# Patient Record
Sex: FEMALE | Race: WHITE | NOT HISPANIC OR LATINO | URBAN - METROPOLITAN AREA
[De-identification: names, ages, dates, MRNs, and addresses within clinical notes are randomized per-mention and may not be internally consistent; named-entity substitution may affect disease eponyms.]

---

## 2014-09-01 RX ORDER — WARFARIN SODIUM 2.5 MG/1
1 TABLET ORAL
Qty: 0 | Refills: 0 | DISCHARGE
Start: 2014-09-01

## 2017-05-27 ENCOUNTER — EMERGENCY (EMERGENCY)
Facility: HOSPITAL | Age: 82
LOS: 1 days | Discharge: HOME | End: 2017-05-27
Attending: EMERGENCY MEDICINE | Admitting: EMERGENCY MEDICINE
Payer: MEDICARE

## 2017-05-27 VITALS
HEART RATE: 65 BPM | SYSTOLIC BLOOD PRESSURE: 147 MMHG | DIASTOLIC BLOOD PRESSURE: 92 MMHG | OXYGEN SATURATION: 99 % | RESPIRATION RATE: 20 BRPM

## 2017-05-27 VITALS
HEART RATE: 82 BPM | RESPIRATION RATE: 16 BRPM | SYSTOLIC BLOOD PRESSURE: 105 MMHG | DIASTOLIC BLOOD PRESSURE: 76 MMHG | OXYGEN SATURATION: 98 %

## 2017-05-27 VITALS
RESPIRATION RATE: 20 BRPM | OXYGEN SATURATION: 99 % | TEMPERATURE: 98 F | HEART RATE: 69 BPM | SYSTOLIC BLOOD PRESSURE: 128 MMHG | DIASTOLIC BLOOD PRESSURE: 74 MMHG

## 2017-05-27 VITALS
TEMPERATURE: 98 F | SYSTOLIC BLOOD PRESSURE: 128 MMHG | RESPIRATION RATE: 17 BRPM | DIASTOLIC BLOOD PRESSURE: 77 MMHG | HEART RATE: 61 BPM | OXYGEN SATURATION: 99 %

## 2017-05-27 LAB
ALBUMIN SERPL ELPH-MCNC: 4.5 G/DL — SIGNIFICANT CHANGE UP (ref 3.3–5)
ALP SERPL-CCNC: 101 U/L — SIGNIFICANT CHANGE UP (ref 40–120)
ALT FLD-CCNC: 20 U/L — SIGNIFICANT CHANGE UP (ref 4–33)
ANISOCYTOSIS BLD QL: SLIGHT — SIGNIFICANT CHANGE UP
APTT BLD: 33.7 SEC — SIGNIFICANT CHANGE UP (ref 27.5–37.4)
AST SERPL-CCNC: 27 U/L — SIGNIFICANT CHANGE UP (ref 4–32)
BASOPHILS # BLD AUTO: 0.05 K/UL — SIGNIFICANT CHANGE UP (ref 0–0.2)
BASOPHILS NFR BLD AUTO: 0.6 % — SIGNIFICANT CHANGE UP (ref 0–2)
BILIRUB SERPL-MCNC: 0.4 MG/DL — SIGNIFICANT CHANGE UP (ref 0.2–1.2)
BLD GP AB SCN SERPL QL: NEGATIVE — SIGNIFICANT CHANGE UP
BUN SERPL-MCNC: 43 MG/DL — HIGH (ref 7–23)
CALCIUM SERPL-MCNC: 10 MG/DL — SIGNIFICANT CHANGE UP (ref 8.4–10.5)
CHLORIDE SERPL-SCNC: 94 MMOL/L — LOW (ref 98–107)
CO2 SERPL-SCNC: 24 MMOL/L — SIGNIFICANT CHANGE UP (ref 22–31)
CREAT SERPL-MCNC: 1.82 MG/DL — HIGH (ref 0.5–1.3)
EOSINOPHIL # BLD AUTO: 0.06 K/UL — SIGNIFICANT CHANGE UP (ref 0–0.5)
EOSINOPHIL NFR BLD AUTO: 0.7 % — SIGNIFICANT CHANGE UP (ref 0–6)
GLUCOSE SERPL-MCNC: 112 MG/DL — HIGH (ref 70–99)
HCT VFR BLD CALC: 37.3 % — SIGNIFICANT CHANGE UP (ref 34.5–45)
HGB BLD-MCNC: 12.3 G/DL — SIGNIFICANT CHANGE UP (ref 11.5–15.5)
IMM GRANULOCYTES NFR BLD AUTO: 0.2 % — SIGNIFICANT CHANGE UP (ref 0–1.5)
INR BLD: 1.57 — HIGH (ref 0.88–1.17)
LYMPHOCYTES # BLD AUTO: 1.54 K/UL — SIGNIFICANT CHANGE UP (ref 1–3.3)
LYMPHOCYTES # BLD AUTO: 17.8 % — SIGNIFICANT CHANGE UP (ref 13–44)
MCHC RBC-ENTMCNC: 23.1 PG — LOW (ref 27–34)
MCHC RBC-ENTMCNC: 33 % — SIGNIFICANT CHANGE UP (ref 32–36)
MCV RBC AUTO: 70 FL — LOW (ref 80–100)
MICROCYTES BLD QL: SLIGHT — SIGNIFICANT CHANGE UP
MONOCYTES # BLD AUTO: 0.97 K/UL — HIGH (ref 0–0.9)
MONOCYTES NFR BLD AUTO: 11.2 % — SIGNIFICANT CHANGE UP (ref 2–14)
NEUTROPHILS # BLD AUTO: 6.01 K/UL — SIGNIFICANT CHANGE UP (ref 1.8–7.4)
NEUTROPHILS NFR BLD AUTO: 69.5 % — SIGNIFICANT CHANGE UP (ref 43–77)
OVALOCYTES BLD QL SMEAR: SLIGHT — SIGNIFICANT CHANGE UP
PLATELET # BLD AUTO: 218 K/UL — SIGNIFICANT CHANGE UP (ref 150–400)
PLATELET COUNT - ESTIMATE: NORMAL — SIGNIFICANT CHANGE UP
PMV BLD: 11.1 FL — SIGNIFICANT CHANGE UP (ref 7–13)
POIKILOCYTOSIS BLD QL AUTO: SLIGHT — SIGNIFICANT CHANGE UP
POTASSIUM SERPL-MCNC: 4.7 MMOL/L — SIGNIFICANT CHANGE UP (ref 3.5–5.3)
POTASSIUM SERPL-SCNC: 4.7 MMOL/L — SIGNIFICANT CHANGE UP (ref 3.5–5.3)
PROT SERPL-MCNC: 8 G/DL — SIGNIFICANT CHANGE UP (ref 6–8.3)
PROTHROM AB SERPL-ACNC: 17.8 SEC — HIGH (ref 9.8–13.1)
RBC # BLD: 5.33 M/UL — HIGH (ref 3.8–5.2)
RBC # FLD: 16.9 % — HIGH (ref 10.3–14.5)
RH IG SCN BLD-IMP: POSITIVE — SIGNIFICANT CHANGE UP
SODIUM SERPL-SCNC: 133 MMOL/L — LOW (ref 135–145)
WBC # BLD: 8.65 K/UL — SIGNIFICANT CHANGE UP (ref 3.8–10.5)
WBC # FLD AUTO: 8.65 K/UL — SIGNIFICANT CHANGE UP (ref 3.8–10.5)

## 2017-05-27 PROCEDURE — 99284 EMERGENCY DEPT VISIT MOD MDM: CPT | Mod: 25,GC

## 2017-05-27 PROCEDURE — 30901 CONTROL OF NOSEBLEED: CPT

## 2017-05-27 NOTE — ED PROVIDER NOTE - ATTENDING CONTRIBUTION TO CARE
89f, afib on coumadin. presented overnight last night for epistaxis. b/l surgicel placed by ENT. awoke this evening with blood on her hand. stuck a swab with afrin in her nose, didn't stop so plced a piece of cotton in her nose and came to ED. in ED, vs wnl, nad. cotton removed with clot and surgicel on it. no active bleeding, pharynx has no bleeding. 2 distinct areas of old blood on right nare, cauterized with silveer nitrate. no marcelo for repeat labs as asymptomatic otherwise. will observe x 2 h and d/c if no more bleeding.

## 2017-05-27 NOTE — ED PROVIDER NOTE - OBJECTIVE STATEMENT
89F with hx of Afib on coumadin and HTN presents with nose bleed. Pt was in ED for nose bleed this morning, heavy bleeding and was managed in ED and also seen by ENT. Pt was instructed to use Afrin and D./c Home. Pt came back because pt had nose bleed again after she woke up from a nap. Nose bleed was not as heavy as before. Pt used Afrin on a cotton gauze and packed her nose. In ED, pt is not actively bleeding.

## 2017-05-27 NOTE — PROGRESS NOTE ADULT - SUBJECTIVE AND OBJECTIVE BOX
CC: epistaxis  HPI: 89 year old female presents with epistaxis since 3 am from the right nostril, s/p unilateral packing in the ED with question of persistent bleeding. Patient is on coumadin. Has self resolving nose bleeds on and off    Past Medical History:  Atrial fibrillation    DVT (deep venous thrombosis)    HTN (hypertension)    Hypothyroid.    Past Surgical History:  Cardiac pacemaker.    Tobacco Usage:  · Tobacco Usage	Never smoker	    ALLERGIES AND HOME MEDICATIONS:   Allergies:        Allergies:  	No Known Allergies:     Home Medications:   * Patient Currently Takes Medications as of 29-Aug-2014 19:41 documented in Prescription Writer  · 	Coumadin 3 mg oral tablet: 1 tab(s) orally once a day  · 	losartan 100 mg oral tablet: 1 tab(s) orally once a day  · 	phenylephrine 0.25% rectal suppository: 1 suppository(ies) rectal once a day  · 	docusate sodium 100 mg oral capsule: 1 cap(s) orally 2 times a day  · 	polyethylene glycol 3350 oral powder for reconstitution: 17 gram(s) orally once a day  · 	calcium (as carbonate)-vitamin D 250 mg-125 intl units oral tablet: 1 tab(s) orally 2 times a day  · 	Multiple Vitamins with Minerals oral tablet: 1 tab(s) orally once a day  · 	ascorbic acid 500 mg oral tablet: 1 tab(s) orally once a day  · 	digoxin 125 mcg (0.125 mg) oral tablet: 1 tab(s) orally once a day  · 	furosemide 40 mg oral tablet: 1 tab(s) orally once a day  · 	levothyroxine 75 mcg (0.075 mg) oral capsule: 1 cap(s) orally once a day  · 	biotin 5 mg oral capsule: 1 cap(s) orally once a day  · 	diltiazem 360 mg/24 hours oral tablet, extended release: 1 tab(s) orally once a day    ROS: as above    nad  AAOx3  Right nostril with rhinorocket that is not well positioned and out of the nose  removed  then bilateral nasal cavities irrigated  nasal endoscopy: no pinpoint place of bleeding, no active bleeding, left septal deviation   oc: no further bleeding in posterior oropharynx  surgicel laid across both sides of the septum

## 2017-05-27 NOTE — ED ADULT TRIAGE NOTE - CHIEF COMPLAINT QUOTE
pt. arrives to ED with an active nose bleed. Pt. on coumadin , states she has a hx. of blood transfusions in the past for her nose bleed.

## 2017-05-27 NOTE — ED PROVIDER NOTE - PROGRESS NOTE DETAILS
Gollogly: Pt seen by ENT, who recommend afrin, nasal spray, bacitracin with outpt follow up. Gollogly: Pt seen by ENT, who recommend afrin, nasal spray, bacitracin with outpt follow up. Nasal packing removed by ENT, no further epistaxis.

## 2017-05-27 NOTE — ED ADULT NURSE NOTE - OBJECTIVE STATEMENT
Pt presents to room 13 with nose bleed, on coumadin for afib. Pt seen earlier today for nose bleed and discharged home. Pt states she was sleeping and her nose started bleeding again. Pt received awake, A&Ox4, denies lightheadedness, dizziness, headache. Respirations even, unlabored, pt denies SOB or chest pain. Abdomen soft, nontender, pt denies N/V/D. Skin warm, dry, appropriate for race. pt ambulatory at baseline with a walker at home, denies recent falls. Pt denies pain. NO apparent distress observed at this time, pt presents with nose packed, no active bleeding observed. VS documented per flow, safety maintained, awaiting MD ricketts. Will continue to monitor.

## 2017-05-27 NOTE — ED PROVIDER NOTE - OBJECTIVE STATEMENT
89yo f pmh DVT (on Coumadin), HTN, hypothyroid, presents with epistaxis. bleeding since approx 4AM, last INR was 3 one week ago. Previously had nose bleed that required 3u rbcs and one week hospital admission.

## 2017-05-27 NOTE — ED PROVIDER NOTE - PROGRESS NOTE DETAILS
Ela: observed and no further bleeding, f/u as previously scheduled. instructions on icing and pinching of nose given.  The patient was given verbal and written discharge instructions. Specifically, instructions when to return to the ED and when to seek follow-up from their pcp was discussed. Any specialty follow-up was discussed, including how to make an appointment.  Instructions were discussed in simple, plain language and was understood by the patient. The patient understands that should their symptoms worsen or any new symptoms arise, they should return to the ED immediately for further evaluation.

## 2017-05-27 NOTE — ED PROVIDER NOTE - ATTENDING CONTRIBUTION TO CARE
HERBERT RICHARDSON, ATTENDING NOTE:  89 yo F with a past medical history of DVT (on Coumadin for AC), Hypertension, hypothyroid, presents with epistaxis. bleeding since 4AM.  Patient states last INR was 3 one week ago. Previously nose bleed required 3U of PRBC's.  Denies chest pain, SOB or dizziness.  HERBERT RICHARDSON, ATTENDING NOTE:  Patient is awake and alert and in no acute distress.  Normocephalic/atraumatic.  Auricles are normal.  Neck supple. Nose: moderate epistaxis from B/L nares.  Lungs CTAB, no wheeze, no rhonchi,  no rales.  Heart is regular rate and rhythm.  Abdomen is soft, not distended +BS.  Back is nontender, no CVAT.  Moving all 4 extremities.   Neurologically grossly intact.  Affect is appropriate.  DR. RICHARDSON, ATTENDING MD-  I performed a face to face bedside interview with patient regarding history of present illness, review of symptoms and past medical history. I completed an independent physical exam.  I have discussed patient's plan of care with Dr. Lange.   I agree with note as stated above, having amended the EMR as needed to reflect my findings. I have discussed the assessment and plan of care.  This includes during the time I functioned as the attending physician for this patient. HERBERT RICHARDSON, ATTENDING NOTE:  89 yo F with a past medical history of DVT, AFib (on Coumadin for AC), Hypertension, hypothyroid, presents with epistaxis. bleeding since 4AM.  Patient states last INR was 3 one week ago. Previously nose bleed required 3U of PRBC's.  Denies chest pain, SOB or dizziness.  HERBERT RICHARDSON, ATTENDING NOTE:  Patient is awake and alert and in no acute distress.  Normocephalic/atraumatic.  Auricles are normal.  Neck supple. Nose: moderate epistaxis from B/L nares. Posterior pharynx clear.  Lungs CTAB, no wheeze, no rhonchi,  no rales.  Heart is irregular rhythm.  Abdomen is soft, not distended +BS.  Back is nontender, no CVAT.  Moving all 4 extremities.   Neurologically grossly intact.  Affect is appropriate.  DR. RICHARDSON, ATTENDING MD-  I performed a face to face bedside interview with patient regarding history of present illness, review of symptoms and past medical history. I completed an independent physical exam.  I have discussed patient's plan of care with Dr. Lange.   I agree with note as stated above, having amended the EMR as needed to reflect my findings. I have discussed the assessment and plan of care.  This includes during the time I functioned as the attending physician for this patient.

## 2017-05-27 NOTE — ED PROVIDER NOTE - CARE PLAN
Principal Discharge DX:	Epistaxis  Instructions for follow-up, activity and diet:	-- Take keflex as prescribed. Your prescription is waiting for you at the pharmacy you selected.   -- Use Afrin (oxymetazoline) 2-3 times per day for 2 days. DO NOT USE FOR LONGER THAN 2 DAYS.  -- Use saline nasal spray every hour for the next several days.  -- Apply bacitracin ointment 3 times a day for next 2-3 days.  -- See referral list to follow up with ENT in 3-5 days. Principal Discharge DX:	Epistaxis  Instructions for follow-up, activity and diet:	-- Use Afrin (oxymetazoline) 2-3 times per day for 2 days. DO NOT USE FOR LONGER THAN 2 DAYS.  -- Use saline nasal spray every hour for the next 3-4 days.  -- Apply bacitracin ointment 3 times a day for next 3-4 days.  -- See referral list to follow up with ENT in 3-5 days.  -- Return to ER immediately for new or worsening symptoms, any urgent issues, or for any concerns.

## 2017-05-27 NOTE — ED PROVIDER NOTE - MEDICAL DECISION MAKING DETAILS
pt with epistaxis on coumadin will check basics/coags, bleeding controlled with rhino rocket to right nostril after failed afrin + pressure. ENT consulted

## 2017-05-27 NOTE — ED ADULT TRIAGE NOTE - CHIEF COMPLAINT QUOTE
Pt is on coumadin , states was here this am. Pt now returns for reoccurring nose bleed. No active nose bleed noted at this time.

## 2017-05-27 NOTE — PROGRESS NOTE ADULT - ASSESSMENT
self resolved epistaxis with surgicel packing  - nasal saline q1  - bacitracin tid  - afrin x 3 days, then prn for bleeding

## 2017-05-27 NOTE — ED PROVIDER NOTE - PLAN OF CARE
-- Take keflex as prescribed. Your prescription is waiting for you at the pharmacy you selected.   -- Use Afrin (oxymetazoline) 2-3 times per day for 2 days. DO NOT USE FOR LONGER THAN 2 DAYS.  -- Use saline nasal spray every hour for the next several days.  -- Apply bacitracin ointment 3 times a day for next 2-3 days.  -- See referral list to follow up with ENT in 3-5 days. -- Use Afrin (oxymetazoline) 2-3 times per day for 2 days. DO NOT USE FOR LONGER THAN 2 DAYS.  -- Use saline nasal spray every hour for the next 3-4 days.  -- Apply bacitracin ointment 3 times a day for next 3-4 days.  -- See referral list to follow up with ENT in 3-5 days.  -- Return to ER immediately for new or worsening symptoms, any urgent issues, or for any concerns.

## 2017-05-27 NOTE — ED ADULT NURSE NOTE - OBJECTIVE STATEMENT
Patient received in room #19 c/o nosebleed starting around 3am while she was sleeping. A&Ox3. Patient reports she is on coumadin, her last INR was tested about a week ago and it was "3". Patient denies any lightheadedness or dizziness. Patient reports similar issue in the past and has had blood transfusions. VS as noted. 20G IV placed in right ac, labs drawn and sent. MD at bedside. Will monitor.

## 2017-05-28 ENCOUNTER — EMERGENCY (EMERGENCY)
Facility: HOSPITAL | Age: 82
LOS: 1 days | Discharge: HOME | End: 2017-05-28
Attending: EMERGENCY MEDICINE | Admitting: EMERGENCY MEDICINE
Payer: MEDICARE

## 2017-05-28 VITALS
SYSTOLIC BLOOD PRESSURE: 138 MMHG | DIASTOLIC BLOOD PRESSURE: 84 MMHG | TEMPERATURE: 98 F | HEART RATE: 70 BPM | OXYGEN SATURATION: 98 % | RESPIRATION RATE: 18 BRPM

## 2017-05-28 VITALS
DIASTOLIC BLOOD PRESSURE: 76 MMHG | HEART RATE: 103 BPM | RESPIRATION RATE: 19 BRPM | OXYGEN SATURATION: 97 % | SYSTOLIC BLOOD PRESSURE: 150 MMHG | TEMPERATURE: 99 F

## 2017-05-28 LAB
ALBUMIN SERPL ELPH-MCNC: 4.1 G/DL — SIGNIFICANT CHANGE UP (ref 3.3–5)
ALP SERPL-CCNC: 86 U/L — SIGNIFICANT CHANGE UP (ref 40–120)
ALT FLD-CCNC: 16 U/L — SIGNIFICANT CHANGE UP (ref 4–33)
APTT BLD: 34.7 SEC — SIGNIFICANT CHANGE UP (ref 27.5–37.4)
AST SERPL-CCNC: 20 U/L — SIGNIFICANT CHANGE UP (ref 4–32)
BILIRUB SERPL-MCNC: 0.5 MG/DL — SIGNIFICANT CHANGE UP (ref 0.2–1.2)
BUN SERPL-MCNC: 40 MG/DL — HIGH (ref 7–23)
CALCIUM SERPL-MCNC: 9.5 MG/DL — SIGNIFICANT CHANGE UP (ref 8.4–10.5)
CHLORIDE SERPL-SCNC: 105 MMOL/L — SIGNIFICANT CHANGE UP (ref 98–107)
CO2 SERPL-SCNC: 23 MMOL/L — SIGNIFICANT CHANGE UP (ref 22–31)
CREAT SERPL-MCNC: 1.61 MG/DL — HIGH (ref 0.5–1.3)
GLUCOSE SERPL-MCNC: 79 MG/DL — SIGNIFICANT CHANGE UP (ref 70–99)
HCT VFR BLD CALC: 37.1 % — SIGNIFICANT CHANGE UP (ref 34.5–45)
HGB BLD-MCNC: 12 G/DL — SIGNIFICANT CHANGE UP (ref 11.5–15.5)
INR BLD: 1.72 — HIGH (ref 0.88–1.17)
MCHC RBC-ENTMCNC: 22.7 PG — LOW (ref 27–34)
MCHC RBC-ENTMCNC: 32.3 % — SIGNIFICANT CHANGE UP (ref 32–36)
MCV RBC AUTO: 70.1 FL — LOW (ref 80–100)
PLATELET # BLD AUTO: 239 K/UL — SIGNIFICANT CHANGE UP (ref 150–400)
PMV BLD: 10.6 FL — SIGNIFICANT CHANGE UP (ref 7–13)
POTASSIUM SERPL-MCNC: 4.5 MMOL/L — SIGNIFICANT CHANGE UP (ref 3.5–5.3)
POTASSIUM SERPL-SCNC: 4.5 MMOL/L — SIGNIFICANT CHANGE UP (ref 3.5–5.3)
PROT SERPL-MCNC: 7.6 G/DL — SIGNIFICANT CHANGE UP (ref 6–8.3)
PROTHROM AB SERPL-ACNC: 19.5 SEC — HIGH (ref 9.8–13.1)
RBC # BLD: 5.29 M/UL — HIGH (ref 3.8–5.2)
RBC # FLD: 17 % — HIGH (ref 10.3–14.5)
SODIUM SERPL-SCNC: 142 MMOL/L — SIGNIFICANT CHANGE UP (ref 135–145)
WBC # BLD: 7.03 K/UL — SIGNIFICANT CHANGE UP (ref 3.8–10.5)
WBC # FLD AUTO: 7.03 K/UL — SIGNIFICANT CHANGE UP (ref 3.8–10.5)

## 2017-05-28 PROCEDURE — 99284 EMERGENCY DEPT VISIT MOD MDM: CPT | Mod: 25

## 2017-05-28 PROCEDURE — 30903 CONTROL OF NOSEBLEED: CPT

## 2017-05-28 RX ORDER — CEPHALEXIN 500 MG
1 CAPSULE ORAL
Qty: 10 | Refills: 0
Start: 2017-05-28 | End: 2017-06-02

## 2017-05-28 RX ORDER — CEPHALEXIN 500 MG
1 CAPSULE ORAL
Qty: 10 | Refills: 0 | OUTPATIENT
Start: 2017-05-28 | End: 2017-06-02

## 2017-05-28 NOTE — ED PROVIDER NOTE - OBJECTIVE STATEMENT
89 y/o F PMHx afib (on coumadin), HTN, HLD, hypothyroidism presents c/o recurrent epistaxis since this am. 89 y/o F PMHx afib (on coumadin), HTN, HLD, hypothyroidism presents c/o recurrent epistaxis since this am. Pt has been in the ER 4 times over the past 2 days for epistaxis. Was initially seen by ENT who cauterized the bleed, then put in a rhinorocket that was taken out. Bleeding stopped. When the bleeding recurred, the left nares was packed and pt dc'c after bleeding stopped. Today bleeding recurred in the right nares prompting ED visit. Of note, pt required admission and transfusion in the past for epistaxis. Denies fevers, chills, or any other complaints.

## 2017-05-28 NOTE — ED PROVIDER NOTE - PLAN OF CARE
See your primary care doctor within 24-48 hours. See your ENT this week for further evaluation, bring copies of all reports with you. Continue Keflex 1 tab twice a day for 5 days. Continue nasal saline into the left nostril throughout the day. DO NOT remove the packing from the right nostril (to be removed by ENT). Return to the ER for worsening symptoms or any other concerns.

## 2017-05-28 NOTE — ED PROVIDER NOTE - MEDICAL DECISION MAKING DETAILS
89 y/o F w/ recurrent epistaxis- R rhinorocket placed but subsequent cessation of bleeding. Plan labs, reassess

## 2017-05-28 NOTE — ED PROVIDER NOTE - ATTENDING CONTRIBUTION TO CARE
att att: Juancho ALCANTAR Attending Note - Dr. Dawkins  89 y/o F PMHx afib (on coumadin), HTN, HLD, hypothyroidism presents c/o recurrent epistaxis since this am.  PE: pt is alert and oriented, perrl, ent nose new bleeding from right nostril, membranes are moist, neck supple. no lymphadenopathy or thyroid enlargement, No JVD.  Chest clear to P&A, Heart- reg rhythm without murmur, rubs or gallops, radial pulses equal bilaterally.  Abd is soft, non-tender, Bowel sounds are active. no mass or organomegaly. : No CVA tenderness. Neuro:  Pt alert and oriented x 3. Perrl    Distal neurosensory is intact. Motor function is 5/5 strength bilaterally.  No focal deficits. Extremities:  No edema.  Skin: warm and dry.

## 2017-05-28 NOTE — ED PROVIDER NOTE - PROGRESS NOTE DETAILS
JONEL Calvo: Pt reassessed, no complaints, has not had any bleeding since the rhinorocket was placed. Pt offered to stay in the CDU, wants to be admitted, however H/H stable. Chanda PGY3: Pt was offered CDU for obs and possible admission given recurrent epistaxis, however pt does not want to stay in CDU, demanding admission. Explained to pt that Hb unchanged over 24 hours, bleeding currently controlled, however that from CDU if bleeding reoccurs or any other concerns pt could be admitted at that point. Pt still refusing CDU. Pt has f/u with ENT on Tuesday, explained to pt that bleeding is currently controlled and no evidence of anemia, is stable for d/c. Pt would prefer discharge over CDU. Will d/c. JONEL Calvo: SW to arrange transport.

## 2017-05-28 NOTE — ED ADULT TRIAGE NOTE - CHIEF COMPLAINT QUOTE
Co epistaxis. Started last night and came to [facility] and had packing placed. Bleeding started again today. On warfarin.

## 2017-05-28 NOTE — ED PROVIDER NOTE - CARE PLAN
Principal Discharge DX:	Epistaxis, recurrent Principal Discharge DX:	Epistaxis, recurrent  Instructions for follow-up, activity and diet:	See your primary care doctor within 24-48 hours. See your ENT this week for further evaluation, bring copies of all reports with you. Continue Keflex 1 tab twice a day for 5 days. Continue nasal saline into the left nostril throughout the day. DO NOT remove the packing from the right nostril (to be removed by ENT). Return to the ER for worsening symptoms or any other concerns.

## 2017-05-28 NOTE — ED ADULT NURSE NOTE - CHIEF COMPLAINT QUOTE
Co epistaxis. Started last night and came to [facility] and had packing placed. Bleeding started again today. On warfarin.

## 2017-05-29 ENCOUNTER — EMERGENCY (EMERGENCY)
Facility: HOSPITAL | Age: 82
LOS: 1 days | Discharge: HOME | End: 2017-05-29
Attending: EMERGENCY MEDICINE | Admitting: EMERGENCY MEDICINE
Payer: MEDICARE

## 2017-05-29 VITALS
SYSTOLIC BLOOD PRESSURE: 151 MMHG | HEART RATE: 75 BPM | TEMPERATURE: 98 F | OXYGEN SATURATION: 100 % | RESPIRATION RATE: 18 BRPM | DIASTOLIC BLOOD PRESSURE: 86 MMHG

## 2017-05-29 VITALS
RESPIRATION RATE: 18 BRPM | SYSTOLIC BLOOD PRESSURE: 140 MMHG | OXYGEN SATURATION: 98 % | HEART RATE: 64 BPM | DIASTOLIC BLOOD PRESSURE: 91 MMHG

## 2017-05-29 LAB
ALBUMIN SERPL ELPH-MCNC: 4.2 G/DL — SIGNIFICANT CHANGE UP (ref 3.3–5)
ALP SERPL-CCNC: 87 U/L — SIGNIFICANT CHANGE UP (ref 40–120)
ALT FLD-CCNC: 19 U/L — SIGNIFICANT CHANGE UP (ref 4–33)
APTT BLD: 33.7 SEC — SIGNIFICANT CHANGE UP (ref 27.5–37.4)
AST SERPL-CCNC: 28 U/L — SIGNIFICANT CHANGE UP (ref 4–32)
BASOPHILS # BLD AUTO: 0.05 K/UL — SIGNIFICANT CHANGE UP (ref 0–0.2)
BASOPHILS NFR BLD AUTO: 0.6 % — SIGNIFICANT CHANGE UP (ref 0–2)
BILIRUB SERPL-MCNC: 0.7 MG/DL — SIGNIFICANT CHANGE UP (ref 0.2–1.2)
BUN SERPL-MCNC: 39 MG/DL — HIGH (ref 7–23)
CALCIUM SERPL-MCNC: 9.7 MG/DL — SIGNIFICANT CHANGE UP (ref 8.4–10.5)
CHLORIDE SERPL-SCNC: 101 MMOL/L — SIGNIFICANT CHANGE UP (ref 98–107)
CO2 SERPL-SCNC: 22 MMOL/L — SIGNIFICANT CHANGE UP (ref 22–31)
CREAT SERPL-MCNC: 1.31 MG/DL — HIGH (ref 0.5–1.3)
EOSINOPHIL # BLD AUTO: 0.09 K/UL — SIGNIFICANT CHANGE UP (ref 0–0.5)
EOSINOPHIL NFR BLD AUTO: 1 % — SIGNIFICANT CHANGE UP (ref 0–6)
GLUCOSE SERPL-MCNC: 86 MG/DL — SIGNIFICANT CHANGE UP (ref 70–99)
HCT VFR BLD CALC: 36.6 % — SIGNIFICANT CHANGE UP (ref 34.5–45)
HGB BLD-MCNC: 12.2 G/DL — SIGNIFICANT CHANGE UP (ref 11.5–15.5)
IMM GRANULOCYTES NFR BLD AUTO: 0.2 % — SIGNIFICANT CHANGE UP (ref 0–1.5)
INR BLD: 1.31 — HIGH (ref 0.88–1.17)
LYMPHOCYTES # BLD AUTO: 1.83 K/UL — SIGNIFICANT CHANGE UP (ref 1–3.3)
LYMPHOCYTES # BLD AUTO: 20.5 % — SIGNIFICANT CHANGE UP (ref 13–44)
MCHC RBC-ENTMCNC: 23.3 PG — LOW (ref 27–34)
MCHC RBC-ENTMCNC: 33.3 % — SIGNIFICANT CHANGE UP (ref 32–36)
MCV RBC AUTO: 70 FL — LOW (ref 80–100)
MONOCYTES # BLD AUTO: 0.83 K/UL — SIGNIFICANT CHANGE UP (ref 0–0.9)
MONOCYTES NFR BLD AUTO: 9.3 % — SIGNIFICANT CHANGE UP (ref 2–14)
NEUTROPHILS # BLD AUTO: 6.12 K/UL — SIGNIFICANT CHANGE UP (ref 1.8–7.4)
NEUTROPHILS NFR BLD AUTO: 68.4 % — SIGNIFICANT CHANGE UP (ref 43–77)
PLATELET # BLD AUTO: 212 K/UL — SIGNIFICANT CHANGE UP (ref 150–400)
PMV BLD: 10.5 FL — SIGNIFICANT CHANGE UP (ref 7–13)
POTASSIUM SERPL-MCNC: 4.6 MMOL/L — SIGNIFICANT CHANGE UP (ref 3.5–5.3)
POTASSIUM SERPL-SCNC: 4.6 MMOL/L — SIGNIFICANT CHANGE UP (ref 3.5–5.3)
PROT SERPL-MCNC: 7.7 G/DL — SIGNIFICANT CHANGE UP (ref 6–8.3)
PROTHROM AB SERPL-ACNC: 14.8 SEC — HIGH (ref 9.8–13.1)
RBC # BLD: 5.23 M/UL — HIGH (ref 3.8–5.2)
RBC # FLD: 17.1 % — HIGH (ref 10.3–14.5)
SODIUM SERPL-SCNC: 138 MMOL/L — SIGNIFICANT CHANGE UP (ref 135–145)
WBC # BLD: 8.94 K/UL — SIGNIFICANT CHANGE UP (ref 3.8–10.5)
WBC # FLD AUTO: 8.94 K/UL — SIGNIFICANT CHANGE UP (ref 3.8–10.5)

## 2017-05-29 PROCEDURE — 99284 EMERGENCY DEPT VISIT MOD MDM: CPT | Mod: GC

## 2017-05-29 NOTE — ED PROVIDER NOTE - ATTENDING CONTRIBUTION TO CARE
89yo F w/ AFib on coumadin, HTN, HLD, p/w epistaxis, 5th ED visit in last few days, had rhino rocket placed yest, states she had to sleep upright last night because every time she would lay down she would have posterior bleeding, then today developed anterior bleeding around the rhino rocket so came to ED.  Denies further trauma, feels otherwise well. On exam: alert in no distress, VSS, + rapidrhino in R nare with slight oozing anteriorly. No blood in pharynx. Labs sent and ENT consulted.

## 2017-05-29 NOTE — ED PROVIDER NOTE - PROGRESS NOTE DETAILS
Rhino starr replaced by ENT, no further bleeding, pt feels comfortable going home and will see her ENT this wk.

## 2017-05-29 NOTE — ED ADULT NURSE NOTE - OBJECTIVE STATEMENT
Pt received to rm 13, a&ox3, c/o multiple episodes of epistaxis, requesting admission today. Pt on coumadin for afib, mild to moderate bleeding noted to R nostril with rhinorocket in place. Pt denies any pain or discomfort at present, no sob/dypnea noted. Labs drawn and sent, IVL placed. MD by bedside for eval.

## 2017-05-29 NOTE — ED PROVIDER NOTE - OBJECTIVE STATEMENT
89yo F w/ AFib on coumadin, HTN, HLD, p/w epistaxis, 5th ED visit in last few days, had rhino rocket placed yest, states she had to sleep upright last night because every time she would lay down she would have posterior bleeding, then today developed anterior bleeding around the rhino rocket so came to ED.  Denies further trauma, feels otherwise well.  ENT: Jef Hopkins

## 2017-05-29 NOTE — CONSULT NOTE ADULT - SUBJECTIVE AND OBJECTIVE BOX
Reason for Consultation:ED  Requested by:ENT    Patient is a 89y old  Female who presents with a chief complaint of epistaxis  HPI: 89 yo F with history of epistaxis, on coumadin, had RR placed by ED yesterday, continues to have mild oozing posterioly and anteriorly this AM. No difficulty breathing. Not coughing up blood.    Birth History:  PAST MEDICAL & SURGICAL HISTORY:  Atrial fibrillation  Hypothyroid  HTN (hypertension)  DVT (deep venous thrombosis)  Cardiac pacemaker      Allergies  No Known Allergies    REVIEW OF SYSTEMS:    CONSTITUTIONAL: No fever, weight loss, or fatigue  EYES: No eye pain, visual disturbances, or discharge  ENMT: + Epistaxis as in HPI No difficulty hearing, tinnitus, vertigo; No sinus or throat pain  NECK: No pain or stiffness  RESPIRATORY: No cough, wheezing, chills or hemoptysis; No shortness of breath  CARDIOVASCULAR: No chest pain, palpitations, dizziness, or leg swelling  GASTROINTESTINAL: No abdominal or epigastric pain. No nausea, vomiting, or hematemesis; No diarrhea or constipation. No melena or hematochezia.                        12.2   8.94  )-----------( 212      ( 29 May 2017 10:25 )             36.6     05-28    142  |  105  |  40<H>  ----------------------------<  79  4.5   |  23  |  1.61<H>    Ca    9.5      28 May 2017 11:01    TPro  7.6  /  Alb  4.1  /  TBili  0.5  /  DBili  x   /  AST  20  /  ALT  16  /  AlkPhos  86  05-28    INR: 1.31    Vital Signs Last 24 Hrs  T(C): 36.7, Max: 36.7 (05-29 @ 10:00)  T(F): 98.1, Max: 98.1 (05-29 @ 10:00)  HR: 64 (64 - 75)  BP: 140/91 (140/91 - 151/86)  BP(mean): --  RR: 18 (18 - 18)  SpO2: 98% (98% - 100%)      PHYSICAL EXAM:  Constitutional Normal: well nourished, well developed, non-dysmorphic, no acute distress  Psychiatric: age appropriate behavior, cooperative  Skin: no obvious skin lesions  Lymphatic: no cervical lymphadenopathy    External Nose:  Normal, no structural deformities		  Anterior Nasal Cavity:	RR in R naris paritally out, cuff not inflated. L NC patent, no bleeding. RR replaced on R, fully inflated. No active bleeding noted  Oral Cavity:  Good dentition, tongue midline, no lesions or ulcerations, no bleeding noted  Neck: No palpable lymphadenopathy  Pulmonary: No Acute Distress.   Neurologic: awake and alert      89 yo F with recurerrent epistaxis  -RR to remain in place x 4 days (FU with Dr. Hopkins, her outpt ENT, on Thursday of this week for removal)  -Keflex BID until removal  -Pt may FU with [facility] ENT if she prefers in 3 days for removal. [phone]  -Cuff may be further inflated by ED should pt continue have mild oozing  -Please page ENT with any further questions

## 2017-05-29 NOTE — ED ADULT TRIAGE NOTE - CHIEF COMPLAINT QUOTE
pt c/o epistaxis on coumadin. pt reports stopping coumadin on saturday. pt seen in ED multiple times over past few days.

## 2017-05-31 ENCOUNTER — APPOINTMENT (OUTPATIENT)
Dept: ELECTROPHYSIOLOGY | Facility: CLINIC | Age: 82
End: 2017-05-31

## 2017-05-31 ENCOUNTER — EMERGENCY (EMERGENCY)
Facility: HOSPITAL | Age: 82
LOS: 1 days | Discharge: HOME | End: 2017-05-31
Attending: EMERGENCY MEDICINE | Admitting: EMERGENCY MEDICINE
Payer: MEDICARE

## 2017-05-31 VITALS
OXYGEN SATURATION: 98 % | DIASTOLIC BLOOD PRESSURE: 72 MMHG | HEART RATE: 65 BPM | RESPIRATION RATE: 18 BRPM | SYSTOLIC BLOOD PRESSURE: 122 MMHG

## 2017-05-31 VITALS
SYSTOLIC BLOOD PRESSURE: 141 MMHG | DIASTOLIC BLOOD PRESSURE: 83 MMHG | HEART RATE: 72 BPM | OXYGEN SATURATION: 96 % | RESPIRATION RATE: 14 BRPM | TEMPERATURE: 98 F

## 2017-05-31 LAB
ALBUMIN SERPL ELPH-MCNC: 4.2 G/DL — SIGNIFICANT CHANGE UP (ref 3.3–5)
ALP SERPL-CCNC: 95 U/L — SIGNIFICANT CHANGE UP (ref 40–120)
ALT FLD-CCNC: 15 U/L — SIGNIFICANT CHANGE UP (ref 4–33)
ANISOCYTOSIS BLD QL: SIGNIFICANT CHANGE UP
APTT BLD: 29.9 SEC — SIGNIFICANT CHANGE UP (ref 27.5–37.4)
AST SERPL-CCNC: 19 U/L — SIGNIFICANT CHANGE UP (ref 4–32)
BASOPHILS # BLD AUTO: 0.03 K/UL — SIGNIFICANT CHANGE UP (ref 0–0.2)
BASOPHILS NFR BLD AUTO: 0.2 % — SIGNIFICANT CHANGE UP (ref 0–2)
BASOPHILS NFR SPEC: 0 % — SIGNIFICANT CHANGE UP (ref 0–2)
BILIRUB SERPL-MCNC: 1.1 MG/DL — SIGNIFICANT CHANGE UP (ref 0.2–1.2)
BUN SERPL-MCNC: 35 MG/DL — HIGH (ref 7–23)
CALCIUM SERPL-MCNC: 10.1 MG/DL — SIGNIFICANT CHANGE UP (ref 8.4–10.5)
CHLORIDE SERPL-SCNC: 97 MMOL/L — LOW (ref 98–107)
CO2 SERPL-SCNC: 24 MMOL/L — SIGNIFICANT CHANGE UP (ref 22–31)
CREAT SERPL-MCNC: 1.25 MG/DL — SIGNIFICANT CHANGE UP (ref 0.5–1.3)
ELLIPTOCYTES BLD QL SMEAR: SLIGHT — SIGNIFICANT CHANGE UP
EOSINOPHIL # BLD AUTO: 0.05 K/UL — SIGNIFICANT CHANGE UP (ref 0–0.5)
EOSINOPHIL NFR BLD AUTO: 0.3 % — SIGNIFICANT CHANGE UP (ref 0–6)
EOSINOPHIL NFR FLD: 0 % — SIGNIFICANT CHANGE UP (ref 0–6)
GIANT PLATELETS BLD QL SMEAR: PRESENT — SIGNIFICANT CHANGE UP
GLUCOSE SERPL-MCNC: 102 MG/DL — HIGH (ref 70–99)
HCT VFR BLD CALC: 37.4 % — SIGNIFICANT CHANGE UP (ref 34.5–45)
HGB BLD-MCNC: 12.7 G/DL — SIGNIFICANT CHANGE UP (ref 11.5–15.5)
IMM GRANULOCYTES NFR BLD AUTO: 0.1 % — SIGNIFICANT CHANGE UP (ref 0–1.5)
INR BLD: 1.12 — SIGNIFICANT CHANGE UP (ref 0.88–1.17)
LYMPHOCYTES # BLD AUTO: 1.52 K/UL — SIGNIFICANT CHANGE UP (ref 1–3.3)
LYMPHOCYTES # BLD AUTO: 10.6 % — LOW (ref 13–44)
LYMPHOCYTES NFR SPEC AUTO: 11 % — LOW (ref 13–44)
MANUAL SMEAR VERIFICATION: SIGNIFICANT CHANGE UP
MCHC RBC-ENTMCNC: 23.4 PG — LOW (ref 27–34)
MCHC RBC-ENTMCNC: 34 % — SIGNIFICANT CHANGE UP (ref 32–36)
MCV RBC AUTO: 69 FL — LOW (ref 80–100)
MICROCYTES BLD QL: SIGNIFICANT CHANGE UP
MONOCYTES # BLD AUTO: 2.24 K/UL — HIGH (ref 0–0.9)
MONOCYTES NFR BLD AUTO: 15.6 % — HIGH (ref 2–14)
MONOCYTES NFR BLD: 13 % — HIGH (ref 2–9)
NEUTROPHIL AB SER-ACNC: 76 % — SIGNIFICANT CHANGE UP (ref 43–77)
NEUTROPHILS # BLD AUTO: 10.46 K/UL — HIGH (ref 1.8–7.4)
NEUTROPHILS NFR BLD AUTO: 73.2 % — SIGNIFICANT CHANGE UP (ref 43–77)
PLATELET # BLD AUTO: 227 K/UL — SIGNIFICANT CHANGE UP (ref 150–400)
PLATELET COUNT - ESTIMATE: NORMAL — SIGNIFICANT CHANGE UP
PMV BLD: 10.6 FL — SIGNIFICANT CHANGE UP (ref 7–13)
POIKILOCYTOSIS BLD QL AUTO: SIGNIFICANT CHANGE UP
POTASSIUM SERPL-MCNC: 4 MMOL/L — SIGNIFICANT CHANGE UP (ref 3.5–5.3)
POTASSIUM SERPL-SCNC: 4 MMOL/L — SIGNIFICANT CHANGE UP (ref 3.5–5.3)
PROT SERPL-MCNC: 7.8 G/DL — SIGNIFICANT CHANGE UP (ref 6–8.3)
PROTHROM AB SERPL-ACNC: 12.6 SEC — SIGNIFICANT CHANGE UP (ref 9.8–13.1)
RBC # BLD: 5.42 M/UL — HIGH (ref 3.8–5.2)
RBC # FLD: 17 % — HIGH (ref 10.3–14.5)
SCHISTOCYTES BLD QL AUTO: SLIGHT — SIGNIFICANT CHANGE UP
SICKLE CELLS BLD QL SMEAR: SLIGHT — SIGNIFICANT CHANGE UP
SODIUM SERPL-SCNC: 138 MMOL/L — SIGNIFICANT CHANGE UP (ref 135–145)
TARGETS BLD QL SMEAR: SLIGHT — SIGNIFICANT CHANGE UP
WBC # BLD: 14.32 K/UL — HIGH (ref 3.8–10.5)
WBC # FLD AUTO: 14.32 K/UL — HIGH (ref 3.8–10.5)

## 2017-05-31 PROCEDURE — 99284 EMERGENCY DEPT VISIT MOD MDM: CPT | Mod: 25,GC

## 2017-05-31 NOTE — ED PROVIDER NOTE - ATTENDING CONTRIBUTION TO CARE
Pt was seen and evaluated by me. Pt states she has been back several times for recurrent epistaxis. Pt notes last episode required packing. Pt states she had some bleeding through the packing. Pt denies any sensation of swallowing blood. Pt denies any weakness, nausea, vomiting, SOB, chest pain, or abd pain. Pt his on Coumadin for A-fib. Noted to have Rhino Rocket to right nares with some dried blood to gauze. Posterior pharynx clear. Heart rate irregularly irregular. Lungs CTA b/l. Abd soft, non-tender.

## 2017-05-31 NOTE — ED ADULT NURSE REASSESSMENT NOTE - NS ED NURSE REASSESS COMMENT FT1
pt. a&ox3, in NAD, MD inserted new pack on rt nostril, no active bleeding at this time. awaits dispo. will continue to monitor

## 2017-05-31 NOTE — ED PROVIDER NOTE - OBJECTIVE STATEMENT
89 y/o F pt with PMHx of DVT, A-fib, HTN, and Hypothyroidism and PSHx of cardiac pacemaker, arrives to the ED c/o epistaxis episodes onset x2 days. Pt reports that she was here 2 days ago for the same sx; had packing put in place and was put on Keflex. Pt then saw her personal ENT yesterday and had part of the packing removed. Last epistaxis episode was 2am tonight which prompted arrival to ED. Denies CP, dizziness, or any other complaints. NKDA.

## 2017-05-31 NOTE — ED PROVIDER NOTE - QUALITY
89 y/o F pt with PMHx of Blood clots, Hypothyroidism, arrives to the ED c/o epistaxis episodes onset x2 days. Pt reports that she was here 2 days ago for the same sx; had packing put in place and was put on Keflex. Pt then saw her personal ENT yesterday and had part of the packing removed. Last epistaxis episode was 2am tonight which prompted arrival to ED. Denies CP, dizziness, or any other complaints. NKDA.

## 2017-05-31 NOTE — ED ADULT NURSE REASSESSMENT NOTE - NS ED NURSE REASSESS COMMENT FT1
pt. in NAD, was initially discharge, saline lock was removed. upon discharge, pt. had another episode of minimal bleeding on rt nostril. no active bleeding now. will monitor as per JONEL Long and for possible CDU. will continue to provide nsg care.

## 2017-05-31 NOTE — ED PROVIDER NOTE - NS ED MD SCRIBE ATTENDING SCRIBE SECTIONS
DISPOSITION/VITAL SIGNS( Pullset)/REVIEW OF SYSTEMS/PAST MEDICAL/SURGICAL/SOCIAL HISTORY/HISTORY OF PRESENT ILLNESS

## 2017-05-31 NOTE — ED PROVIDER NOTE - PROGRESS NOTE DETAILS
Rhino rocket removed noted to have some clots, no active bleeding. TXA applied to gauze and placed into right nares. Gauze removed and noted to have slight oozing still. Rhino rocket placed to right nares. labs reviewed. Rhino in place. Bleeding as stopped at this time. Pt stable for discharge and to continue kelfex and to follow up with private ENT. Discharge papers given to patient. Pt was ready for discharge and nose started bleeding slightly. Rhino rocket not saturated, mild bleeding. No heavy bleeding. Pt refuses to go home due to 6 times returning to ED for same complaint. Will continue to monitor and place in CDU for observation this am, once bed is available. labs reviewed. Rhino in place. Bleeding as stopped at this time. Pt stable for discharge and to continue keflex and to follow up with private ENT. Pt refusing to go to observation unit. Pt not actively bleeding at this time. Pt will be discharged and discussed to return if any worsening symptoms. Pt observed for 2 hours post nasal packing with no active bleeding. Mild blood tinge when pt wipes nose but not bleeding from nares observed. Discussed with pt what to return for and to f/u with ENT. Pt observed for 2 hours post nasal packing with no active bleeding. Mild blood tinge when pt wipes nose but not bleeding from nares observed. Discussed with pt what to return for and to f/u with ENT. Advised to continue regular medication.

## 2017-05-31 NOTE — ED ADULT NURSE NOTE - OBJECTIVE STATEMENT
pt. came in c/o nose bleeding x 2 hrs. pt. states this is the 7th time she had nose bleed since last month. pt. had her rt nostril packed last Monday, noticed the dressing this morning with blood and then some bleeding on left nostril too. pt. was on Coumadin and since the bleeding she stopped taking them. pt. denies any dizziness nor weakness nor trauma. labs to be drawn. will continue to monitor

## 2017-05-31 NOTE — ED PROVIDER NOTE - MEDICAL DECISION MAKING DETAILS
89 y/o female on coumadin for A-fib presenting with recurrent epistaxis after packing. Labs, TXA, possible re-packing

## 2017-07-03 NOTE — ED ADULT NURSE NOTE - MUSCULOSKELETAL ASSESSMENT
I will need to see rash; you can squeeze her in today; but please let pt know that she may have to wait. Otherwise I can see her Thurs. Thank you.   WDL

## 2017-08-04 ENCOUNTER — NON-APPOINTMENT (OUTPATIENT)
Age: 82
End: 2017-08-04

## 2017-08-04 ENCOUNTER — APPOINTMENT (OUTPATIENT)
Dept: ELECTROPHYSIOLOGY | Facility: CLINIC | Age: 82
End: 2017-08-04
Payer: MEDICARE

## 2017-08-04 VITALS
WEIGHT: 150 LBS | HEART RATE: 60 BPM | SYSTOLIC BLOOD PRESSURE: 119 MMHG | DIASTOLIC BLOOD PRESSURE: 74 MMHG | OXYGEN SATURATION: 97 % | HEIGHT: 60 IN | BODY MASS INDEX: 29.45 KG/M2

## 2017-08-04 PROCEDURE — 93279 PRGRMG DEV EVAL PM/LDLS PM: CPT

## 2017-12-29 ENCOUNTER — NON-APPOINTMENT (OUTPATIENT)
Age: 82
End: 2017-12-29

## 2017-12-29 ENCOUNTER — APPOINTMENT (OUTPATIENT)
Dept: ELECTROPHYSIOLOGY | Facility: CLINIC | Age: 82
End: 2017-12-29
Payer: MEDICARE

## 2017-12-29 VITALS
HEIGHT: 60 IN | HEART RATE: 66 BPM | WEIGHT: 140 LBS | BODY MASS INDEX: 27.48 KG/M2 | DIASTOLIC BLOOD PRESSURE: 87 MMHG | OXYGEN SATURATION: 97 % | SYSTOLIC BLOOD PRESSURE: 150 MMHG

## 2017-12-29 PROCEDURE — 93279 PRGRMG DEV EVAL PM/LDLS PM: CPT

## 2018-03-09 ENCOUNTER — EMERGENCY (EMERGENCY)
Facility: HOSPITAL | Age: 83
LOS: 1 days | Discharge: HOME | End: 2018-03-09
Attending: EMERGENCY MEDICINE | Admitting: EMERGENCY MEDICINE
Payer: MEDICARE

## 2018-03-09 VITALS
DIASTOLIC BLOOD PRESSURE: 80 MMHG | HEART RATE: 85 BPM | SYSTOLIC BLOOD PRESSURE: 152 MMHG | OXYGEN SATURATION: 96 % | TEMPERATURE: 98 F | RESPIRATION RATE: 16 BRPM

## 2018-03-09 PROCEDURE — 99282 EMERGENCY DEPT VISIT SF MDM: CPT | Mod: GC

## 2018-03-09 NOTE — ED PROVIDER NOTE - MEDICAL DECISION MAKING DETAILS
Patient with history of severe epistaxis requiring hospitalization. Has been holding coumadin since her nosebleed started 3 days ago. Will do basic labs, obs for now because nose has no active bleeding at this time.

## 2018-03-09 NOTE — ED PROVIDER NOTE - NS ED ROS FT
GENERAL: No fever or chills, //             EYES: no change in vision, //             HEENT: no trouble swallowing or speaking, //             CARDIAC: no chest pain, //              PULMONARY: no cough or SOB, //             GI: no abdominal pain, no nausea or no vomiting, no diarrhea or constipation, //             : No changes in urination,  //            SKIN: no rashes,  //            NEURO: no headache,  //             MSK: No joint pain otherwise as HPI or negative. ~Aman Ott M.D., Ph.D. -Resident

## 2018-03-09 NOTE — ED PROVIDER NOTE - PHYSICAL EXAMINATION
Gen: NAD, AOx3, non-toxic //            Head: NCAT //            HEENT: EOMI, oral mucosa moist, normal conjunctiva, anterior packing removed and there is hemostasis with no ooze in nasal cavity with clot obstructing nasal passage.//            Lung: CTAB, no respiratory distress, no wheezes/rhonchi/rales B/L, speaking in full sentences. //            CV: irregular rate and rhythm, no murmurs, rubs or gallops //            Abd: soft, NTND, no guarding, no CVA tenderness //            MSK: no visible deformities //            Neuro: No focal sensory or motor deficits //            Skin: Warm, well perfused, no rash //            Psych: normal affect. ~Aman Ott M.D., Ph.D. -Resident

## 2018-03-09 NOTE — ED PROVIDER NOTE - OBJECTIVE STATEMENT
89 female history of afib on coumadin here for nosebleed. Onset 3 days ago, has been going to her ENT daily who has packed it anteriorly, most recently this AM (Name Karri Hopkins). No chest pain, no shortness of breath, no lightheadedness. No fevers/chills. No blood running in back of throat now. Anxious because has had nose bleed in the past that required ICU stay.

## 2018-03-09 NOTE — ED PROVIDER NOTE - ATTENDING CONTRIBUTION TO CARE
89yo F afib on coumadin p/w recurrent epistaxis daily for past 3 day, has been by ENT daily who has packed it anteriorly, most recently this AM, patient reports dripping of blood resumed at home prompting ED visit. Patient reports using tissue and placing within nostril then using finger nails to remove portions of stuck tissues. Patient has held coumadin for past 3d due to bleeding with the advice of her internist. Patient has prior history of similar bleeding, regularly follows with ENT  On exam awake & alert, NAD., mmm, friable nasal mucosa b/l, clotted blood R nares, no posterior pharyngeal bleeding, lungs CTAB, 2+ pulses b/l, neuro A&Ox3, skin warm and dry no rash

## 2018-03-09 NOTE — ED PROVIDER NOTE - PLAN OF CARE
You were seen in the ER for nosebleed. You must follow up with your primary physician in 24 to 48 hours. Return to the ER for any new or worsening signs/symptoms.   1) You must follow up with your ENT doctor on Monday.   2) Do not manipulate your nose or place your finger in your nose.

## 2018-03-09 NOTE — ED ADULT TRIAGE NOTE - CHIEF COMPLAINT QUOTE
Pt with nosebleed. Pt takes coumadin. Has seen ENT over the last few days where they packed the nose a few times. Nosebleed continues.

## 2018-03-09 NOTE — ED ADULT NURSE NOTE - OBJECTIVE STATEMENT
89F, PMH of afib on coumadin presents with epistaxis x 3 days. Pt states she has been seeing her ENT whom has been packing her nose each of the past 3 days. Pt has not taken her coumadin since the bleeding began, denies trauma, dizziness, lightheadedness, SOB. No active bleeding. Resp even and unlabored.

## 2018-03-09 NOTE — ED PROVIDER NOTE - PROGRESS NOTE DETAILS
- MD Tru,PhD - Resident: patient endorsing digital trauma to nose daily by placing tissues in nose and then picking them out. She auto-discontinued her coumadin when the bleeds started and her internist is aware and stated it was ok for her to do that at this time. Continues to have hemostasis so will d/c home with close PCP and ENT Follow up Monday. Recommend no more digital manipulation of nose.

## 2018-06-29 NOTE — ED PROVIDER NOTE - TOBACCO USE
Discharge Instructions:  Diet as tolerated-avoid constipation  Resume usual home medications unless otherwise instructed  Activity as tolerated  May shower or bathe in 24 hours, pat incisions dry  No swimming, hot tub, or bath tub use for 2 weeks.  Change jazmine pad as needed  Pelvic rest (No tampons, douching, or sex) for 2 weeks  No driving for 1 week and not while taking pain medications  Don’t lift anything heavier than 25 pounds for 1 week  Remove dressings in 48 hours    Patient/Family given For your Well Being Teaching Sheet:  _X__ Care After Anesthesia/ Sedation  _X__ Pain Management Tips  _X__ About Surgical Site infection  ___ Smoking and second hand Smoke information  ___ Other:     FOLLOW-UP CARE  _X__ Make an appointment to see your doctor follow up.  ___ Call for results in ______days    Call your doctor if you have:   Temperature above 101 (F) or chills  Pain not relieved by pain medication  Vaginal bleeding heavier than one pad per hour or foul vaginal discharge  Severe abdominal pain/ bloating  Marked redness, drainage, or excessive bleeding from incision  Neck or shoulder pain not responding to pain medication  Leg pain or swelling  Chest pain or shortness of breath  Difficulty urinating  Fainting     Never smoker

## 2018-07-10 ENCOUNTER — APPOINTMENT (OUTPATIENT)
Dept: ELECTROPHYSIOLOGY | Facility: CLINIC | Age: 83
End: 2018-07-10
Payer: MEDICARE

## 2018-07-10 VITALS
OXYGEN SATURATION: 96 % | DIASTOLIC BLOOD PRESSURE: 70 MMHG | SYSTOLIC BLOOD PRESSURE: 114 MMHG | WEIGHT: 144 LBS | HEART RATE: 60 BPM | HEIGHT: 60 IN | BODY MASS INDEX: 28.27 KG/M2

## 2018-07-10 DIAGNOSIS — I48.91 UNSPECIFIED ATRIAL FIBRILLATION: ICD-10-CM

## 2018-07-10 PROCEDURE — 93279 PRGRMG DEV EVAL PM/LDLS PM: CPT

## 2018-08-21 NOTE — ED ADULT TRIAGE NOTE - NS ED TRIAGE AVPU SCALE
Abrasion of chest wall Alert-The patient is alert, awake and responds to voice. The patient is oriented to time, place, and person. The triage nurse is able to obtain subjective information.

## 2018-10-16 ENCOUNTER — APPOINTMENT (OUTPATIENT)
Dept: ELECTROPHYSIOLOGY | Facility: CLINIC | Age: 83
End: 2018-10-16

## 2018-12-06 NOTE — ED PROVIDER NOTE - GENITOURINARY BLADDER
Patient Instructions by Marguerite Ewing MD at 09/01/17 01:04 PM     Author:  Marguerite Ewing MD Service:  (none) Author Type:  Physician     Filed:  09/01/17 01:04 PM Encounter Date:  9/1/2017 Status:  Signed     :  Marguerite Ewing MD (Physician)            Additional Educational Resources:  For additional resources regarding your symptoms, diagnosis, or further health information, please visit the Health Resources section on [email or web address] or the Online Health Resources section in [employer].        Revision History        User Key Date/Time User Provider Type Action    > [N/A] 09/01/17 01:04 PM Marguerite Ewing MD Physician Sign            
non-distended

## 2019-01-15 RX ADMIN — HYDROMORPHONE HYDROCHLORIDE 0.2 MILLIGRAM(S): 2 INJECTION INTRAMUSCULAR; INTRAVENOUS; SUBCUTANEOUS at 03:48

## 2019-02-19 NOTE — ED PROCEDURE NOTE - PROCEDURE NAME, MLM
Nasal Procedures No Residual Tumor Seen Histology Text: There were no malignant cells seen in the sections examined.

## 2019-08-01 ENCOUNTER — APPOINTMENT (OUTPATIENT)
Dept: ELECTROPHYSIOLOGY | Facility: CLINIC | Age: 84
End: 2019-08-01

## 2019-09-16 NOTE — ED PROVIDER NOTE - THROAT, MLM
Patient has the following symptoms: Possible UTI and lower back pain  The symptoms have been present for 2 weeks  Patient was not offered an appointment.  Pharmacy has been verified. yes   uvula midline, no vesicles, no redness, and no oropharyngeal exudate.

## 2019-10-25 ENCOUNTER — APPOINTMENT (OUTPATIENT)
Dept: ELECTROPHYSIOLOGY | Facility: CLINIC | Age: 84
End: 2019-10-25

## 2019-12-19 ENCOUNTER — INPATIENT (INPATIENT)
Facility: HOSPITAL | Age: 84
LOS: 6 days | Discharge: NURSING, REHAB OR LONG-TERM CARE | DRG: 313 | End: 2019-12-26
Attending: INTERNAL MEDICINE | Admitting: INTERNAL MEDICINE
Payer: MEDICARE

## 2019-12-19 VITALS
SYSTOLIC BLOOD PRESSURE: 172 MMHG | DIASTOLIC BLOOD PRESSURE: 99 MMHG | HEART RATE: 60 BPM | OXYGEN SATURATION: 95 % | HEIGHT: 60 IN | RESPIRATION RATE: 16 BRPM | TEMPERATURE: 97 F

## 2019-12-19 DIAGNOSIS — R07.9 CHEST PAIN, UNSPECIFIED: ICD-10-CM

## 2019-12-19 LAB
ALBUMIN SERPL ELPH-MCNC: 3.6 G/DL — SIGNIFICANT CHANGE UP (ref 3.3–5)
ALP SERPL-CCNC: 132 U/L — HIGH (ref 40–120)
ALT FLD-CCNC: 18 U/L — SIGNIFICANT CHANGE UP (ref 10–45)
ANION GAP SERPL CALC-SCNC: 12 MMOL/L — SIGNIFICANT CHANGE UP (ref 5–17)
APPEARANCE UR: CLEAR — SIGNIFICANT CHANGE UP
APTT BLD: 51.4 SEC — HIGH (ref 27.5–36.3)
AST SERPL-CCNC: 22 U/L — SIGNIFICANT CHANGE UP (ref 10–40)
BASOPHILS # BLD AUTO: 0 K/UL — SIGNIFICANT CHANGE UP (ref 0–0.2)
BASOPHILS NFR BLD AUTO: 0 % — SIGNIFICANT CHANGE UP (ref 0–2)
BILIRUB SERPL-MCNC: 0.8 MG/DL — SIGNIFICANT CHANGE UP (ref 0.2–1.2)
BILIRUB UR-MCNC: NEGATIVE — SIGNIFICANT CHANGE UP
BUN SERPL-MCNC: 29 MG/DL — HIGH (ref 7–23)
CALCIUM SERPL-MCNC: 9.8 MG/DL — SIGNIFICANT CHANGE UP (ref 8.4–10.5)
CHLORIDE SERPL-SCNC: 103 MMOL/L — SIGNIFICANT CHANGE UP (ref 96–108)
CO2 SERPL-SCNC: 24 MMOL/L — SIGNIFICANT CHANGE UP (ref 22–31)
COLOR SPEC: YELLOW — SIGNIFICANT CHANGE UP
CREAT SERPL-MCNC: 1 MG/DL — SIGNIFICANT CHANGE UP (ref 0.5–1.3)
DIFF PNL FLD: NEGATIVE — SIGNIFICANT CHANGE UP
EOSINOPHIL # BLD AUTO: 0 K/UL — SIGNIFICANT CHANGE UP (ref 0–0.5)
EOSINOPHIL NFR BLD AUTO: 0 % — SIGNIFICANT CHANGE UP (ref 0–6)
FLU A RESULT: SIGNIFICANT CHANGE UP
FLU A RESULT: SIGNIFICANT CHANGE UP
FLUAV AG NPH QL: SIGNIFICANT CHANGE UP
FLUBV AG NPH QL: SIGNIFICANT CHANGE UP
GAS PNL BLDV: SIGNIFICANT CHANGE UP
GLUCOSE SERPL-MCNC: 99 MG/DL — SIGNIFICANT CHANGE UP (ref 70–99)
GLUCOSE UR QL: NEGATIVE — SIGNIFICANT CHANGE UP
HCT VFR BLD CALC: 36.5 % — SIGNIFICANT CHANGE UP (ref 34.5–45)
HGB BLD-MCNC: 12.3 G/DL — SIGNIFICANT CHANGE UP (ref 11.5–15.5)
INR BLD: 5.65 RATIO — CRITICAL HIGH (ref 0.88–1.16)
KETONES UR-MCNC: NEGATIVE — SIGNIFICANT CHANGE UP
LEUKOCYTE ESTERASE UR-ACNC: ABNORMAL
LIDOCAIN IGE QN: 95 U/L — HIGH (ref 7–60)
LYMPHOCYTES # BLD AUTO: 0.53 K/UL — LOW (ref 1–3.3)
LYMPHOCYTES # BLD AUTO: 5.3 % — LOW (ref 13–44)
MCHC RBC-ENTMCNC: 23.3 PG — LOW (ref 27–34)
MCHC RBC-ENTMCNC: 33.7 GM/DL — SIGNIFICANT CHANGE UP (ref 32–36)
MCV RBC AUTO: 69.1 FL — LOW (ref 80–100)
MONOCYTES # BLD AUTO: 0.53 K/UL — SIGNIFICANT CHANGE UP (ref 0–0.9)
MONOCYTES NFR BLD AUTO: 5.3 % — SIGNIFICANT CHANGE UP (ref 2–14)
NEUTROPHILS # BLD AUTO: 8.74 K/UL — HIGH (ref 1.8–7.4)
NEUTROPHILS NFR BLD AUTO: 87.6 % — HIGH (ref 43–77)
NITRITE UR-MCNC: NEGATIVE — SIGNIFICANT CHANGE UP
NT-PROBNP SERPL-SCNC: 5543 PG/ML — HIGH (ref 0–300)
PH UR: 6.5 — SIGNIFICANT CHANGE UP (ref 5–8)
PLATELET # BLD AUTO: 208 K/UL — SIGNIFICANT CHANGE UP (ref 150–400)
POTASSIUM SERPL-MCNC: 3.4 MMOL/L — LOW (ref 3.5–5.3)
POTASSIUM SERPL-SCNC: 3.4 MMOL/L — LOW (ref 3.5–5.3)
PROT SERPL-MCNC: 7.4 G/DL — SIGNIFICANT CHANGE UP (ref 6–8.3)
PROT UR-MCNC: 100 — SIGNIFICANT CHANGE UP
PROTHROM AB SERPL-ACNC: 67.9 SEC — HIGH (ref 10–12.9)
RBC # BLD: 5.28 M/UL — HIGH (ref 3.8–5.2)
RBC # FLD: 16 % — HIGH (ref 10.3–14.5)
RSV RESULT: SIGNIFICANT CHANGE UP
RSV RNA RESP QL NAA+PROBE: SIGNIFICANT CHANGE UP
SODIUM SERPL-SCNC: 139 MMOL/L — SIGNIFICANT CHANGE UP (ref 135–145)
SP GR SPEC: >1.05 (ref 1.01–1.02)
TROPONIN T, HIGH SENSITIVITY RESULT: 32 NG/L — SIGNIFICANT CHANGE UP (ref 0–51)
UROBILINOGEN FLD QL: NEGATIVE — SIGNIFICANT CHANGE UP
WBC # BLD: 9.98 K/UL — SIGNIFICANT CHANGE UP (ref 3.8–10.5)
WBC # FLD AUTO: 9.98 K/UL — SIGNIFICANT CHANGE UP (ref 3.8–10.5)

## 2019-12-19 PROCEDURE — 71275 CT ANGIOGRAPHY CHEST: CPT | Mod: 26

## 2019-12-19 PROCEDURE — 71045 X-RAY EXAM CHEST 1 VIEW: CPT | Mod: 26

## 2019-12-19 PROCEDURE — 70450 CT HEAD/BRAIN W/O DYE: CPT | Mod: 26

## 2019-12-19 PROCEDURE — 93010 ELECTROCARDIOGRAM REPORT: CPT | Mod: GC

## 2019-12-19 PROCEDURE — 74174 CTA ABD&PLVS W/CONTRAST: CPT | Mod: 26

## 2019-12-19 PROCEDURE — 99285 EMERGENCY DEPT VISIT HI MDM: CPT | Mod: GC

## 2019-12-19 PROCEDURE — 99231 SBSQ HOSP IP/OBS SF/LOW 25: CPT

## 2019-12-19 RX ORDER — LEVOTHYROXINE SODIUM 125 MCG
75 TABLET ORAL DAILY
Refills: 0 | Status: DISCONTINUED | OUTPATIENT
Start: 2019-12-19 | End: 2019-12-19

## 2019-12-19 RX ORDER — LOSARTAN POTASSIUM 100 MG/1
100 TABLET, FILM COATED ORAL DAILY
Refills: 0 | Status: DISCONTINUED | OUTPATIENT
Start: 2019-12-19 | End: 2019-12-22

## 2019-12-19 RX ORDER — LIDOCAINE 4 G/100G
1 CREAM TOPICAL DAILY
Refills: 0 | Status: DISCONTINUED | OUTPATIENT
Start: 2019-12-19 | End: 2019-12-26

## 2019-12-19 RX ORDER — TRAMADOL HYDROCHLORIDE 50 MG/1
25 TABLET ORAL
Refills: 0 | Status: DISCONTINUED | OUTPATIENT
Start: 2019-12-19 | End: 2019-12-25

## 2019-12-19 RX ORDER — ACETAMINOPHEN 500 MG
975 TABLET ORAL ONCE
Refills: 0 | Status: COMPLETED | OUTPATIENT
Start: 2019-12-19 | End: 2019-12-19

## 2019-12-19 RX ORDER — ACETAMINOPHEN 500 MG
650 TABLET ORAL EVERY 6 HOURS
Refills: 0 | Status: DISCONTINUED | OUTPATIENT
Start: 2019-12-19 | End: 2019-12-26

## 2019-12-19 RX ORDER — POTASSIUM CHLORIDE 20 MEQ
20 PACKET (EA) ORAL ONCE
Refills: 0 | Status: COMPLETED | OUTPATIENT
Start: 2019-12-19 | End: 2019-12-19

## 2019-12-19 RX ORDER — POTASSIUM CHLORIDE 20 MEQ
40 PACKET (EA) ORAL ONCE
Refills: 0 | Status: COMPLETED | OUTPATIENT
Start: 2019-12-20 | End: 2019-12-20

## 2019-12-19 RX ORDER — DIGOXIN 250 MCG
0.12 TABLET ORAL DAILY
Refills: 0 | Status: DISCONTINUED | OUTPATIENT
Start: 2019-12-19 | End: 2019-12-22

## 2019-12-19 RX ORDER — DILTIAZEM HCL 120 MG
300 CAPSULE, EXT RELEASE 24 HR ORAL DAILY
Refills: 0 | Status: DISCONTINUED | OUTPATIENT
Start: 2019-12-19 | End: 2019-12-19

## 2019-12-19 RX ORDER — MULTIVIT-MIN/FERROUS GLUCONATE 9 MG/15 ML
1 LIQUID (ML) ORAL DAILY
Refills: 0 | Status: DISCONTINUED | OUTPATIENT
Start: 2019-12-19 | End: 2019-12-20

## 2019-12-19 RX ORDER — FUROSEMIDE 40 MG
20 TABLET ORAL
Refills: 0 | Status: DISCONTINUED | OUTPATIENT
Start: 2019-12-19 | End: 2019-12-21

## 2019-12-19 RX ORDER — VERAPAMIL HCL 240 MG
240 CAPSULE, EXTENDED RELEASE PELLETS 24 HR ORAL DAILY
Refills: 0 | Status: DISCONTINUED | OUTPATIENT
Start: 2019-12-19 | End: 2019-12-25

## 2019-12-19 RX ORDER — POTASSIUM CHLORIDE 20 MEQ
40 PACKET (EA) ORAL EVERY 4 HOURS
Refills: 0 | Status: COMPLETED | OUTPATIENT
Start: 2019-12-19 | End: 2019-12-20

## 2019-12-19 RX ORDER — LEVOTHYROXINE SODIUM 125 MCG
88 TABLET ORAL DAILY
Refills: 0 | Status: DISCONTINUED | OUTPATIENT
Start: 2019-12-19 | End: 2019-12-26

## 2019-12-19 RX ADMIN — Medication 975 MILLIGRAM(S): at 13:30

## 2019-12-19 RX ADMIN — Medication 0.12 MILLIGRAM(S): at 21:53

## 2019-12-19 RX ADMIN — Medication 650 MILLIGRAM(S): at 22:18

## 2019-12-19 RX ADMIN — LOSARTAN POTASSIUM 100 MILLIGRAM(S): 100 TABLET, FILM COATED ORAL at 21:52

## 2019-12-19 RX ADMIN — Medication 20 MILLIEQUIVALENT(S): at 22:51

## 2019-12-19 RX ADMIN — Medication 1 TABLET(S): at 21:53

## 2019-12-19 NOTE — ED PROVIDER NOTE - PROGRESS NOTE DETAILS
pt labs reviewed mild elevation in lipase - awaiting ct, inr 5.7 will hold coumadin, no reversal unless needed for finding on ct PGY2/MD Jostin. Pt has rib fractures from 7-10, called surgeon Yousef, since this is not traumatic, no traumatic consult is required. admission to meds for r/o pathological fractures. Dr. Alvarez accepted the pt.

## 2019-12-19 NOTE — ED ADULT NURSE NOTE - NSIMPLEMENTINTERV_GEN_ALL_ED
Implemented All Fall with Harm Risk Interventions:  [place] to call system. Call bell, personal items and telephone within reach. Instruct patient to call for assistance. Room bathroom lighting operational. Non-slip footwear when patient is off stretcher. Physically safe environment: no spills, clutter or unnecessary equipment. Stretcher in lowest position, wheels locked, appropriate side rails in place. Provide visual cue, wrist band, yellow gown, etc. Monitor gait and stability. Monitor for mental status changes and reorient to person, place, and time. Review medications for side effects contributing to fall risk. Reinforce activity limits and safety measures with patient and family. Provide visual clues: red socks.

## 2019-12-19 NOTE — H&P ADULT - ASSESSMENT
89 yo F with  DVT (on Coumadin), PPI, HTN, hypothyroid, p/w chest pain, worsening x 1wk. Pt has coughed, dry, x 1wk. Developed exclusive pain, now, not able to move around, no eating x 2 days. Pt lives by herself. Noticed nodular, skin lesion on the chest recently, with discharge. Pain radiates to back and stomach. Family noted pt is in sob and increased work of breathing.  pt with known kx of a.fib, sss, s/p ppm.  rib fx ?cause /fall  pain management  incentive spirometer  chf systolic acute on chronic  pt on no Lasix called her pharmacy  start lasix 20 mg iv bid  pulmonary edema on ct chest  head ct sec to increase INR of 5 hold coumadin  chronic A.fib , sss, s/p ppm  continue verapamil/ losartan  echo in am  physical therapy

## 2019-12-19 NOTE — ED PROVIDER NOTE - PHYSICAL EXAMINATION
PGY2/MD Jostin.   VITALS: reviewed  GEN: No apparent distress, A & O x 4  HEAD/EYES: NC/AT, PERRL, EOMI, anicteric sclerae, no conjunctival pallor  ENT: mucus membranes moist, oropharynx WNL, trachea midline, no JVD, neck is supple  RESP: lungs CTA with equal breath sounds bilaterally, chest wall + tenderness on b/l lower ribs  CV: heart with reg rhythm S1, S2, no murmur; distal pulses intact and symmetric bilaterally  ABDOMEN: normoactive bowel sounds, soft, nondistended, non tenderness  MSK: extremities atraumatic and nontender, no edema, no asymmetry.  SKIN: warm, dry, no rash, no bruising, no cyanosis. color appropriate for ethnicity  NEURO: alert, mentating appropriately, no facial asymmetry.  PSYCH: Affect appropriate

## 2019-12-19 NOTE — ED PROVIDER NOTE - CLINICAL SUMMARY MEDICAL DECISION MAKING FREE TEXT BOX
PGY2/MD Jostin. 89 yo F with DVT on coumadin, PPI, p/w chest pain, b/l lower ribs. No rashes but skin leision on the chest, with discharge, some tenderness. any motion worsens the pain, locally tender, likely muscle skeletal especially on the ribs, but needs r/o pathological fractures, skin cancer etc. not likely AAA or major cardiac, but will be ruled out with CT. PGY2/MD Jostin. 89 yo F with DVT on coumadin, PPI, p/w chest pain, b/l lower ribs. No rashes but skin leision on the chest, with discharge, some tenderness. any motion worsens the pain, locally tender, likely muscle skeletal especially on the ribs, but needs r/o pathological fractures, skin cancer etc. not likely AAA or major cardiac, but will be ruled out with CT.  ankur 89 f with ppm with bl chest pain to back and sob, abd soft nt  pain worse with mopvt, will send trop r/o acs - ekg ppm - will cta r/o disection and reeval -

## 2019-12-19 NOTE — ED PROVIDER NOTE - OBJECTIVE STATEMENT
PGY2/MD Jostin. 89 yo F with  DVT (on Coumadin), PPI, HTN, hypothyroid, p/w chest pain, worsening x 1wk. Pt has coughed, dry, x 1wk. Developed exclusive pain, now, not able to move around, no eating x 2 days. Pt lives by herself. Noticed nodular, skin lesion on the chest recently, with discharge. Pain radiates to back and stomach. Family noted pt is in sob and increased work of breathing.

## 2019-12-19 NOTE — H&P ADULT - HISTORY OF PRESENT ILLNESS
CHIEF COMPLAINT:Patient is a 89y old  Female who presents with a chief complaint of l side rib/chest aoin.    HPI:  89 yo F with  DVT (on Coumadin), PPI, HTN, hypothyroid, p/w chest pain, worsening x 1wk. Pt has coughed, dry, x 1wk. Developed exclusive pain, now, not able to move around, no eating x 2 days. Pt lives by herself. Noticed nodular, skin lesion on the chest recently, with discharge. Pain radiates to back and stomach. Family noted pt is in sob and increased work of breathing.  pt with known kx of a.fib, sss, s/p ppm.    PAST MEDICAL & SURGICAL HISTORY:  Atrial fibrillation  Hypothyroid  HTN (hypertension)  DVT (deep venous thrombosis)  Cardiac pacemaker      MEDICATIONS  (STANDING):  noted    MEDICATIONS  (PRN):      FAMILY HISTORY:      SOCIAL HISTORY:    [ ] Non-smoker  [ ] Smoker  [ ] Alcohol    Allergies    No Known Allergies    Intolerances    	    REVIEW OF SYSTEMS:  CONSTITUTIONAL: No fever, weight loss, or fatigue  EYES: No eye pain, visual disturbances, or discharge  ENT:  No difficulty hearing, tinnitus, vertigo; No sinus or throat pain  NECK: No pain or stiffness  RESPIRATORY: No cough, wheezing, chills or hemoptysis; +Shortness of Breath  CARDIOVASCULAR: No chest pain, palpitations, passing out, dizziness, or leg swelling, + l sided chest /rib pain.  GASTROINTESTINAL: No abdominal or epigastric pain. No nausea, vomiting, or hematemesis; No diarrhea or constipation. No melena or hematochezia.  GENITOURINARY: No dysuria, frequency, hematuria, or incontinence  NEUROLOGICAL: No headaches, memory loss, loss of strength, numbness, or tremors  SKIN: No itching, burning, rashes, or lesions   LYMPH Nodes: No enlarged glands  ENDOCRINE: No heat or cold intolerance; No hair loss  MUSCULOSKELETAL: No joint pain or swelling; No muscle, back, or extremity pain  PSYCHIATRIC: No depression, anxiety, mood swings, or difficulty sleeping  HEME/LYMPH: No easy bruising, or bleeding gums  ALLERGY AND IMMUNOLOGIC: No hives or eczema	    [ ] All others negative	  [ ] Unable to obtain    PHYSICAL EXAM:  T(C): 36.3 (12-19-19 @ 16:07), Max: 36.3 (12-19-19 @ 16:07)  HR: 62 (12-19-19 @ 16:07) (60 - 62)  BP: 155/99 (12-19-19 @ 16:07) (155/99 - 172/99)  RR: 18 (12-19-19 @ 16:07) (16 - 18)  SpO2: 96% (12-19-19 @ 16:07) (95% - 97%)  Wt(kg): --  I&O's Summary      Appearance: Normal	  HEENT:   Normal oral mucosa, PERRL, EOMI	  Lymphatic: No lymphadenopathy  Cardiovascular: Normal S1 S2, No JVD, + murmurs, No edema  Respiratory: decrease bs  Psychiatry: A & O x 3, Mood & affect appropriate  Gastrointestinal:  Soft, Non-tender, + BS	  Skin: No rashes, No ecchymoses, No cyanosis	  Neurologic: Non-focal  Extremities: Normal range of motion, No clubbing, cyanosis or edema  Vascular: Peripheral pulses palpable 2+ bilaterally    TELEMETRY: 	    ECG:  	  RADIOLOGY:  OTHER: 	  	  LABS:	 	    CARDIAC MARKERS:                              12.3   9.98  )-----------( 208      ( 19 Dec 2019 13:19 )             36.5     12-19    139  |  103  |  29<H>  ----------------------------<  99  3.4<L>   |  24  |  1.00    Ca    9.8      19 Dec 2019 16:21    TPro  7.4  /  Alb  3.6  /  TBili  0.8  /  DBili  x   /  AST  22  /  ALT  18  /  AlkPhos  132<H>  12-19    proBNP: Serum Pro-Brain Natriuretic Peptide: 5543 pg/mL (12-19 @ 13:19)    Lipid Profile:   HgA1c:   TSH:   PT/INR - ( 19 Dec 2019 13:19 )   PT: 67.9 sec;   INR: 5.65 ratio         PTT - ( 19 Dec 2019 13:19 )  PTT:51.4 sec    PREVIOUS DIAGNOSTIC TESTING:    < from: 12 Lead ECG (08.29.14 @ 16:47) >  DEMAND PACEMAKER; INTERPRETATION IS BASED ON INTRINSIC RHYTHM  SINUS BRADYCARDIA WITH PREMATURE VENTRICULAR COMPLEXES OR FUSION COMPLEXES  LEFT AXIS DEVIATION  SEPTAL INFARCT , AGE UNDETERMINED  ST & T WAVE ABNORMALITY, CONSIDER LATERAL ISCHEMIA    < from: CT Angio Chest w/ IV Cont (12.19.19 @ 17:30) >    LUNGS AND LARGE AIRWAYS: Patent central airways. Biapical scarring and bibasilar linear atelectasis. No pneumonia. No pneumothorax. Interlobular septal thickening noted peripherally of the lungs.  PLEURA: Trace bilateral pleural effusions.  VESSELS: No aortic dissection, penetrating aortic ulcer, or intramural hematoma. Calcified atherosclerotic tortuous thoracic aorta ascending aorta measures 4 cm. Main pulmonary artery measures 3.5 cm.  HEART: Severe right atrial enlargement. No pericardial effusion. Pacemaker.  MEDIASTINUM AND RAMON: No lymphadenopathy.  CHEST WALL AND LOWER NECK: Left chest wall dual-chamber pacemaker.    ABDOMEN AND PELVIS:    LIVER: Subcentimeter low-attenuation lesion in the left hepatic lobe, too small to characterize. Mildly prominent leftlobe.  BILE DUCTS: Normal caliber.  GALLBLADDER: Suggestion of fine sludge.  SPLEEN: Within normal limits.  PANCREAS: Within normal limits.  ADRENALS: Within normal limits.  KIDNEYS/URETERS: Multiple hypodensities within the kidneys some of which represent cysts. Some cannot be characterized    BLADDER: Within normal limits.  REPRODUCTIVE ORGANS: Uterus and adnexa within normal limits.    BOWEL: No bowel obstruction. Colonic diverticulosis without diverticulitis. Small hiatal hernia. Stool in thecolon.  PERITONEUM: No ascites.  VESSELS: Ectatic tortuous abdominal aorta with Atherosclerotic change.  RETROPERITONEUM/LYMPH NODES: No lymphadenopathy.    ABDOMINAL WALL: Within normal limits.  BONES: Acute left seventh through 10th rib fractures. Age-indeterminate T6 compression fracture. Spinal degenerative changes. Mild retrolisthesis of L1 on L2 and mild anterolisthesis of L4 and L5.    IMPRESSION: Evidence of mild peripheral interstitial pulmonary edema.    Acute left seventh through 10th rib fractures.    < from: Xray Chest 1 View AP/PA (12.19.19 @ 14:03) >  Frontal expiratory view of the chest shows the heart to be enlarged in size. The lungs are clear and there is no evidence of pneumothorax nor pleural effusion. Left cardiac pacemaker is again noted.    IMPRESSION:  Cardiomegaly.    Further information may be obtained from the patient's CT of the chest which was pending at the time of this dictation.

## 2019-12-19 NOTE — H&P ADULT - NSICDXPASTMEDICALHX_GEN_ALL_CORE_FT
PAST MEDICAL HISTORY:  Atrial fibrillation     DVT (deep venous thrombosis)     HTN (hypertension)     Hypothyroid

## 2019-12-19 NOTE — ED PROVIDER NOTE - SECONDARY DIAGNOSIS.
Coagulation defect Acute pancreatitis, unspecified complication status, unspecified pancreatitis type

## 2019-12-19 NOTE — ED PROVIDER NOTE - CARE PLAN
Principal Discharge DX:	Chest pain at rest  Secondary Diagnosis:	Coagulation defect  Secondary Diagnosis:	Acute pancreatitis, unspecified complication status, unspecified pancreatitis type

## 2019-12-19 NOTE — ED ADULT NURSE REASSESSMENT NOTE - NS ED NURSE REASSESS COMMENT FT1
Report received from RACHEL Mullins. Pt resting comfortably, NAD noted. Awaiting dispo by MD. Safety maintained at all times, bed in lowest position, call bell in reach. Will continue to monitor closely.

## 2019-12-19 NOTE — CONSULT NOTE ADULT - SUBJECTIVE AND OBJECTIVE BOX
[facility] Trauma Surgery Consultation     Patient is a 89y old  Female who presents with a chief complaint of chest pain/sob (19 Dec 2019 19:54)      CHIEF COMPLAINT:Patient is a 89y old  Female who presents with a chief complaint of l side rib/chest aoin.    HPI:  89 yo F with  a fib (on coumadin), PPI, HTN, hypothyroid, p/w chest pain, worsening x 1wk. Pt has coughed, dry, x 1wk. Developed exclusive pain, now, not able to move around, no eating x 2 days. Pt lives by herself. Noticed nodular, skin lesion on the chest recently, with discharge. Pain radiates to back and stomach. Family noted pt is in sob and increased work of breathing.  pt with known kx of a.fib, sss, s/p ppm.    PAST MEDICAL & SURGICAL HISTORY:  Atrial fibrillation  Hypothyroid  HTN (hypertension)  DVT (deep venous thrombosis)  Cardiac pacemaker      MEDICATIONS:   Digoxin   Verapamil   Coumadin  Synthroid     FAMILY HISTORY:  [place]      SOCIAL HISTORY:    [x] Non-smoker  [x] Smoker  [ ] Alcohol    Allergies    No Known Allergies    Intolerances    	    REVIEW OF SYSTEMS:  CONSTITUTIONAL: No fever, weight loss, or fatigue  EYES: No eye pain, visual disturbances, or discharge  ENT:  No difficulty hearing, tinnitus, vertigo; No sinus or throat pain  NECK: No pain or stiffness  RESPIRATORY: No cough, wheezing, chills or hemoptysis; +Shortness of Breath  CARDIOVASCULAR: No chest pain, palpitations, passing out, dizziness, or leg swelling, + l sided chest /rib pain.  GASTROINTESTINAL: No abdominal or epigastric pain. No nausea, vomiting, or hematemesis; No diarrhea or constipation. No melena or hematochezia.  GENITOURINARY: No dysuria, frequency, hematuria, or incontinence  NEUROLOGICAL: No headaches, memory loss, loss of strength, numbness, or tremors  SKIN: No itching, burning, rashes, or lesions   LYMPH Nodes: No enlarged glands  ENDOCRINE: No heat or cold intolerance; No hair loss  MUSCULOSKELETAL: No joint pain or swelling; No muscle, back, or extremity pain  PSYCHIATRIC: No depression, anxiety, mood swings, or difficulty sleeping  HEME/LYMPH: No easy bruising, or bleeding gums  ALLERGY AND IMMUNOLOGIC: No hives or eczema	    [ ] All others negative	  [ ] Unable to obtain    PHYSICAL EXAM:  T(C): 36.3 (12-19-19 @ 16:07), Max: 36.3 (12-19-19 @ 16:07)  HR: 62 (12-19-19 @ 16:07) (60 - 62)  BP: 155/99 (12-19-19 @ 16:07) (155/99 - 172/99)  RR: 18 (12-19-19 @ 16:07) (16 - 18)  SpO2: 96% (12-19-19 @ 16:07) (95% - 97%)  Wt(kg): --  I&O's Summary      Appearance: Normal	  HEENT:   Normal oral mucosa, PERRL, EOMI	  Lymphatic: No lymphadenopathy  Cardiovascular: Normal S1 S2, No JVD, + murmurs, No edema, bilateral chest wall tenderness   Respiratory: decrease bs  Psychiatry: A & O x 3, Mood & affect appropriate  Gastrointestinal:  Soft, Non-tender, + BS	  Skin: No rashes, No ecchymoses, No cyanosis	  Neurologic: Non-focal  Extremities: Normal range of motion, No clubbing, cyanosis or edema  Vascular: warm top touch, no tenderness in extremities   TELEMETRY: 	    ECG:  	  RADIOLOGY:  OTHER: 	  	  LABS:	 	    CARDIAC MARKERS:                              12.3   9.98  )-----------( 208      ( 19 Dec 2019 13:19 )             36.5     12-19    139  |  103  |  29<H>  ----------------------------<  99  3.4<L>   |  24  |  1.00    Ca    9.8      19 Dec 2019 16:21    TPro  7.4  /  Alb  3.6  /  TBili  0.8  /  DBili  x   /  AST  22  /  ALT  18  /  AlkPhos  132<H>  12-19    proBNP: Serum Pro-Brain Natriuretic Peptide: 5543 pg/mL (12-19 @ 13:19)    Lipid Profile:   HgA1c:   TSH:   PT/INR - ( 19 Dec 2019 13:19 )   PT: 67.9 sec;   INR: 5.65 ratio         PTT - ( 19 Dec 2019 13:19 )  PTT:51.4 sec    PREVIOUS DIAGNOSTIC TESTING:    < from: 12 Lead ECG (08.29.14 @ 16:47) >  DEMAND PACEMAKER; INTERPRETATION IS BASED ON INTRINSIC RHYTHM  SINUS BRADYCARDIA WITH PREMATURE VENTRICULAR COMPLEXES OR FUSION COMPLEXES  LEFT AXIS DEVIATION  SEPTAL INFARCT , AGE UNDETERMINED  ST & T WAVE ABNORMALITY, CONSIDER LATERAL ISCHEMIA    < from: CT Angio Chest w/ IV Cont (12.19.19 @ 17:30) >    LUNGS AND LARGE AIRWAYS: Patent central airways. Biapical scarring and bibasilar linear atelectasis. No pneumonia. No pneumothorax. Interlobular septal thickening noted peripherally of the lungs.  PLEURA: Trace bilateral pleural effusions.  VESSELS: No aortic dissection, penetrating aortic ulcer, or intramural hematoma. Calcified atherosclerotic tortuous thoracic aorta ascending aorta measures 4 cm. Main pulmonary artery measures 3.5 cm.  HEART: Severe right atrial enlargement. No pericardial effusion. Pacemaker.  MEDIASTINUM AND RAMON: No lymphadenopathy.  CHEST WALL AND LOWER NECK: Left chest wall dual-chamber pacemaker.    ABDOMEN AND PELVIS:    LIVER: Subcentimeter low-attenuation lesion in the left hepatic lobe, too small to characterize. Mildly prominent leftlobe.  BILE DUCTS: Normal caliber.  GALLBLADDER: Suggestion of fine sludge.  SPLEEN: Within normal limits.  PANCREAS: Within normal limits.  ADRENALS: Within normal limits.  KIDNEYS/URETERS: Multiple hypodensities within the kidneys some of which represent cysts. Some cannot be characterized    BLADDER: Within normal limits.  REPRODUCTIVE ORGANS: Uterus and adnexa within normal limits.    BOWEL: No bowel obstruction. Colonic diverticulosis without diverticulitis. Small hiatal hernia. Stool in thecolon.  PERITONEUM: No ascites.  VESSELS: Ectatic tortuous abdominal aorta with Atherosclerotic change.  RETROPERITONEUM/LYMPH NODES: No lymphadenopathy.    ABDOMINAL WALL: Within normal limits.  BONES: Acute left seventh through 10th rib fractures. Age-indeterminate T6 compression fracture. Spinal degenerative changes. Mild retrolisthesis of L1 on L2 and mild anterolisthesis of L4 and L5.    IMPRESSION: Evidence of mild peripheral interstitial pulmonary edema.    Acute left seventh through 10th rib fractures.    < from: Xray Chest 1 View AP/PA (12.19.19 @ 14:03) >  Frontal expiratory view of the chest shows the heart to be enlarged in size. The lungs are clear and there is no evidence of pneumothorax nor pleural effusion. Left cardiac pacemaker is again noted.    IMPRESSION:  Cardiomegaly.    Further information may be obtained from the patient's CT of the chest which was pending at the time of this dictation. (19 Dec 2019 19:54)

## 2019-12-19 NOTE — ED ADULT NURSE NOTE - OBJECTIVE STATEMENT
Female 89 years old awake, alert and orientedx3 with past medical history of Afib on Coumadin, pacemaker came in for chest pain. Pt reports pain is on her bilateral chest, radiating under her breast and back sharp intermittent worse when taking a deep breathe or coughing since last Thursday. No injury. Denies fever, chills, nausea, vomiting or urinary symptoms. With occasional non productive cough. Bilateral legs +3 edema. Walks with walker at home. No abdominal pain. All labs obtained. Safety maintained. Will continue to reassess.

## 2019-12-20 ENCOUNTER — TRANSCRIPTION ENCOUNTER (OUTPATIENT)
Age: 84
End: 2019-12-20

## 2019-12-20 LAB
ALBUMIN SERPL ELPH-MCNC: 3.1 G/DL — LOW (ref 3.3–5)
ALP SERPL-CCNC: 123 U/L — HIGH (ref 40–120)
ALT FLD-CCNC: 15 U/L — SIGNIFICANT CHANGE UP (ref 10–45)
ANION GAP SERPL CALC-SCNC: 13 MMOL/L — SIGNIFICANT CHANGE UP (ref 5–17)
AST SERPL-CCNC: 22 U/L — SIGNIFICANT CHANGE UP (ref 10–40)
BILIRUB SERPL-MCNC: 0.6 MG/DL — SIGNIFICANT CHANGE UP (ref 0.2–1.2)
BUN SERPL-MCNC: 29 MG/DL — HIGH (ref 7–23)
CALCIUM SERPL-MCNC: 9.5 MG/DL — SIGNIFICANT CHANGE UP (ref 8.4–10.5)
CHLORIDE SERPL-SCNC: 104 MMOL/L — SIGNIFICANT CHANGE UP (ref 96–108)
CO2 SERPL-SCNC: 23 MMOL/L — SIGNIFICANT CHANGE UP (ref 22–31)
CREAT SERPL-MCNC: 1.08 MG/DL — SIGNIFICANT CHANGE UP (ref 0.5–1.3)
GLUCOSE SERPL-MCNC: 69 MG/DL — LOW (ref 70–99)
HCT VFR BLD CALC: 33.8 % — LOW (ref 34.5–45)
HCT VFR BLD CALC: 35.5 % — SIGNIFICANT CHANGE UP (ref 34.5–45)
HGB BLD-MCNC: 10.9 G/DL — LOW (ref 11.5–15.5)
HGB BLD-MCNC: 11.3 G/DL — LOW (ref 11.5–15.5)
INR BLD: 4.56 RATIO — HIGH (ref 0.88–1.16)
INR BLD: 5.02 RATIO — CRITICAL HIGH (ref 0.88–1.16)
MCHC RBC-ENTMCNC: 22.7 PG — LOW (ref 27–34)
MCHC RBC-ENTMCNC: 22.8 PG — LOW (ref 27–34)
MCHC RBC-ENTMCNC: 31.8 GM/DL — LOW (ref 32–36)
MCHC RBC-ENTMCNC: 32.2 GM/DL — SIGNIFICANT CHANGE UP (ref 32–36)
MCV RBC AUTO: 70.7 FL — LOW (ref 80–100)
MCV RBC AUTO: 71.3 FL — LOW (ref 80–100)
NRBC # BLD: 0 /100 WBCS — SIGNIFICANT CHANGE UP (ref 0–0)
PLATELET # BLD AUTO: 222 K/UL — SIGNIFICANT CHANGE UP (ref 150–400)
PLATELET # BLD AUTO: 226 K/UL — SIGNIFICANT CHANGE UP (ref 150–400)
POTASSIUM SERPL-MCNC: 5 MMOL/L — SIGNIFICANT CHANGE UP (ref 3.5–5.3)
POTASSIUM SERPL-SCNC: 5 MMOL/L — SIGNIFICANT CHANGE UP (ref 3.5–5.3)
PROT SERPL-MCNC: 6.6 G/DL — SIGNIFICANT CHANGE UP (ref 6–8.3)
PROTHROM AB SERPL-ACNC: 54.9 SEC — HIGH (ref 10–12.9)
PROTHROM AB SERPL-ACNC: 60.7 SEC — HIGH (ref 10–13.1)
RBC # BLD: 4.78 M/UL — SIGNIFICANT CHANGE UP (ref 3.8–5.2)
RBC # BLD: 4.98 M/UL — SIGNIFICANT CHANGE UP (ref 3.8–5.2)
RBC # FLD: 16.2 % — HIGH (ref 10.3–14.5)
RBC # FLD: 16.5 % — HIGH (ref 10.3–14.5)
SODIUM SERPL-SCNC: 140 MMOL/L — SIGNIFICANT CHANGE UP (ref 135–145)
TSH SERPL-MCNC: 6.79 UIU/ML — HIGH (ref 0.27–4.2)
WBC # BLD: 7.38 K/UL — SIGNIFICANT CHANGE UP (ref 3.8–10.5)
WBC # BLD: 7.45 K/UL — SIGNIFICANT CHANGE UP (ref 3.8–10.5)
WBC # FLD AUTO: 7.38 K/UL — SIGNIFICANT CHANGE UP (ref 3.8–10.5)
WBC # FLD AUTO: 7.45 K/UL — SIGNIFICANT CHANGE UP (ref 3.8–10.5)

## 2019-12-20 PROCEDURE — 99232 SBSQ HOSP IP/OBS MODERATE 35: CPT

## 2019-12-20 RX ORDER — PHYTONADIONE (VIT K1) 5 MG
5 TABLET ORAL ONCE
Refills: 0 | Status: COMPLETED | OUTPATIENT
Start: 2019-12-20 | End: 2019-12-20

## 2019-12-20 RX ORDER — SENNA PLUS 8.6 MG/1
2 TABLET ORAL AT BEDTIME
Refills: 0 | Status: DISCONTINUED | OUTPATIENT
Start: 2019-12-20 | End: 2019-12-26

## 2019-12-20 RX ADMIN — Medication 240 MILLIGRAM(S): at 09:35

## 2019-12-20 RX ADMIN — TRAMADOL HYDROCHLORIDE 25 MILLIGRAM(S): 50 TABLET ORAL at 03:20

## 2019-12-20 RX ADMIN — TRAMADOL HYDROCHLORIDE 25 MILLIGRAM(S): 50 TABLET ORAL at 10:15

## 2019-12-20 RX ADMIN — TRAMADOL HYDROCHLORIDE 25 MILLIGRAM(S): 50 TABLET ORAL at 03:50

## 2019-12-20 RX ADMIN — TRAMADOL HYDROCHLORIDE 25 MILLIGRAM(S): 50 TABLET ORAL at 09:42

## 2019-12-20 RX ADMIN — Medication 5 MILLIGRAM(S): at 13:20

## 2019-12-20 RX ADMIN — SENNA PLUS 2 TABLET(S): 8.6 TABLET ORAL at 21:09

## 2019-12-20 RX ADMIN — LIDOCAINE 1 PATCH: 4 CREAM TOPICAL at 09:35

## 2019-12-20 RX ADMIN — Medication 20 MILLIGRAM(S): at 06:12

## 2019-12-20 RX ADMIN — LIDOCAINE 1 PATCH: 4 CREAM TOPICAL at 19:08

## 2019-12-20 RX ADMIN — Medication 40 MILLIEQUIVALENT(S): at 03:13

## 2019-12-20 RX ADMIN — LIDOCAINE 1 PATCH: 4 CREAM TOPICAL at 21:09

## 2019-12-20 RX ADMIN — Medication 88 MICROGRAM(S): at 06:12

## 2019-12-20 RX ADMIN — Medication 20 MILLIGRAM(S): at 17:41

## 2019-12-20 NOTE — CHART NOTE - NSCHARTNOTEFT_GEN_A_CORE
TERTIARY TRAUMA SURVEY  ------------------------------------------------------------------------------------    Date of TTS: 12/20/2019  Time: 06:15 AM  Admit Date:  12/19/2019  Trauma Activation: Consult  Admit GCS: 15   E-  4   V- 5      M- 6     HPI:  89 yo F with  DVT (on Coumadin), PPI, HTN, hypothyroid, p/w chest pain, worsening x 1wk. Pt has coughed, dry, x 1wk. Developed exclusive pain, now, not able to move around, no eating x 2 days. Pt lives by herself. Noticed nodular, skin lesion on the chest recently, with discharge. Pain radiates to back and stomach. Family noted pt is in sob and increased work of breathing. pt with known kx of a.fib, sss, s/p ppm.    Interval/Overnight Events:  -- Transferred to floor, no acute events.   -- Tolerating incentive spirometry  -- Pain controlled      PAST MEDICAL & SURGICAL HISTORY:  Atrial fibrillation  Hypothyroid  HTN (hypertension)  DVT (deep venous thrombosis)  Cardiac pacemaker    Allergies  No Known Allergies    CURRENT MEDICATIONS  MEDICATIONS (STANDING): digoxin     Tablet 0.125 milliGRAM(s) Oral daily  furosemide   Injectable 20 milliGRAM(s) IV Push two times a day  levothyroxine 88 MICROGram(s) Oral daily  losartan 100 milliGRAM(s) Oral daily  senna 2 Tablet(s) Oral at bedtime  verapamil  milliGRAM(s) Oral daily    MEDICATIONS (PRN):acetaminophen   Tablet .. 650 milliGRAM(s) Oral every 6 hours PRN Mild Pain (1 - 3), Moderate Pain (4 - 6)  traMADol 25 milliGRAM(s) Oral two times a day PRN Severe Pain (7 - 10)    -----------------------------------------------------------------------------------    VITAL SIGNS:  T(C): 36.9 (12-20-19 @ 13:47), Max: 36.9 (12-20-19 @ 03:31)  HR: 64 (12-20-19 @ 17:40) (61 - 73)  BP: 110/72 (12-20-19 @ 17:40) (110/72 - 161/89)  RR: 18 (12-20-19 @ 13:47) (18 - 18)  SpO2: 95% (12-20-19 @ 13:47) (94% - 98%)  CAPILLARY BLOOD GLUCOSE      Drug Dosing Weight  Height (cm): 172.7 (19 Dec 2019 23:17)  Weight (kg): 72.1 (19 Dec 2019 23:17)  BMI (kg/m2): 24.2 (19 Dec 2019 23:17)  BSA (m2): 1.85 (19 Dec 2019 23:17)      12-19 @ 07:01  -  12-20 @ 07:00  --------------------------------------------------------  IN:    Oral Fluid: 120 mL  Total IN: 120 mL    OUT:  Total OUT: 0 mL  Total NET: 120 mL      12-20 @ 07:01  -  12-20 @ 20:28  --------------------------------------------------------  IN:    Oral Fluid: 900 mL  Total IN: 900 mL    OUT:  Total OUT: 0 mL  Total NET: 900 mL      PHYSICAL EXAM:  General: NAD, lying comfortably in bed.   HEENT: NC/AT, EOMI  Neck: Soft, supple  Cardio: RRR, nml S1/S2  Resp: CTABL, no obvious respiratory distress  Thorax: Left chest wall tenderness  Breast: No lesions/masses, no drainage  GI/Abd: Soft, NT/ND, no rebound/guarding, no masses palpated  Vascular: Extremities warm, brisk cap refill, B/l radial pulses palpable, b/l DP/PT palpable, no palpable abdominal pulsatile mass  Skin: Intact, no breakdown  Musculoskeletal: Full active and passive ROM in LE bilaterally. LUE ROM limited due to pain in the left chest wall.      LABS:  CBC (12-20 @ 11:16)                              11.3<L>                         7.45    )----------------(  222        --    % Neutrophils, --    % Lymphocytes, ANC: --                                  35.5    CBC (12-20 @ 08:46)                              10.9<L>                         7.38    )----------------(  226        --    % Neutrophils, --    % Lymphocytes, ANC: --                                  33.8<L>    BMP (12-20 @ 07:24)             140     |  104     |  29<H> 		Ca++ --      Ca 9.5                ---------------------------------( 69<L> 		Mg --                 5.0     |  23      |  1.08  			Ph --      BMP (12-19 @ 16:21)             139     |  103     |  29<H> 		Ca++ --      Ca 9.8                ---------------------------------( 99    		Mg --                 3.4<L>  |  24      |  1.00  			Ph --        LFTs (12-20 @ 07:24)      TPro 6.6 / Alb 3.1<L> / TBili 0.6 / DBili -- / AST 22 / ALT 15 / AlkPhos 123<H>  LFTs (12-19 @ 16:21)      TPro 7.4 / Alb 3.6 / TBili 0.8 / DBili -- / AST 22 / ALT 18 / AlkPhos 132<H>    Coags (12-20 @ 11:14)  aPTT -- / INR 4.56<H> / PT 54.9<H>  Coags (12-20 @ 09:20)  aPTT -- / INR 5.02<HH> / PT 60.7<H>    VBG (12-19 @ 13:19)     7.42 / 42 / 31 / 27 / 2.6<H> / 56<L>%     Lactate: 1.5      MICROBIOLOGY:  Urinalysis (12-19 @ 21:41):     Color: Yellow / Appearance: Clear / SG: >1.050<!> / pH: 6.5 / Gluc: Negative / Ketones: Negative / Bili: Negative / Urobili: Negative / Protein :100 / Nitrites: Negative / Leuk.Est: Small<!> / RBC: 3 / WBC: 24<H> / Sq Epi:  / Non Sq Epi: 1 / Bacteria Many<!>       ------------------------------------------------------------------------------------------  List Injuries Identified to Date:  Acute left 7th - 10th rib fractures.    INTERPRETATION/ASSESSMENT:   89 yo F with  a fib (on Coumadin), PPI, HTN, p/w chest pain, worsening x 1wk, has acute left seventh through 10th rib fractures.    - No new injuries identified on tertiary trauma survey  - No surgical intervention warranted    Please contact Trauma & Acute Care Surgery (#[phone]) with any questions or concerns.    -

## 2019-12-20 NOTE — PROGRESS NOTE ADULT - SUBJECTIVE AND OBJECTIVE BOX
s/p fall    REVIEW OF SYSTEMS:  GEN: no fever,    no chills  RESP: no SOB,   no cough  CVS: no chest pain,   no palpitations  GI: no abdominal pain,   no nausea,   no vomiting,   no constipation,   no diarrhea  : no dysuria,   no frequency  NEURO: no headache,   no dizziness  PSYCH: no depression,   not anxious  Derm : no rash    MEDICATIONS  (STANDING):  digoxin     Tablet 0.125 milliGRAM(s) Oral daily  furosemide   Injectable 20 milliGRAM(s) IV Push two times a day  levothyroxine 88 MICROGram(s) Oral daily  lidocaine   Patch 1 Patch Transdermal daily  losartan 100 milliGRAM(s) Oral daily  multivitamin/minerals 1 Tablet(s) Oral daily  verapamil  milliGRAM(s) Oral daily    MEDICATIONS  (PRN):  acetaminophen   Tablet .. 650 milliGRAM(s) Oral every 6 hours PRN Mild Pain (1 - 3), Moderate Pain (4 - 6)  traMADol 25 milliGRAM(s) Oral two times a day PRN Severe Pain (7 - 10)      Vital Signs Last 24 Hrs  T(C): 36.5 (20 Dec 2019 06:20), Max: 36.9 (20 Dec 2019 03:31)  T(F): 97.7 (20 Dec 2019 06:20), Max: 98.4 (20 Dec 2019 03:31)  HR: 67 (20 Dec 2019 09:33) (60 - 73)  BP: 149/89 (20 Dec 2019 09:33) (140/85 - 172/99)  BP(mean): --  RR: 18 (20 Dec 2019 06:20) (16 - 18)  SpO2: 94% (20 Dec 2019 06:20) (94% - 100%)  CAPILLARY BLOOD GLUCOSE        I&O's Summary    19 Dec 2019 07:01  -  20 Dec 2019 07:00  --------------------------------------------------------  IN: 120 mL / OUT: 0 mL / NET: 120 mL        PHYSICAL EXAM:  HEAD:  Atraumatic, Normocephalic  NECK: Supple, No   JVD  CHEST/LUNG:   no     rales,     no,    rhonchi  HEART: Regular rate and rhythm;         murmur  ABDOMEN: Soft, Nontender, ;   EXTREMITIES:   no     edema  NEUROLOGY:  alert    LABS:                        10.9   7.38  )-----------( 226      ( 20 Dec 2019 08:46 )             33.8     12-20    140  |  104  |  29<H>  ----------------------------<  69<L>  5.0   |  23  |  1.08    Ca    9.5      20 Dec 2019 07:24    TPro  6.6  /  Alb  3.1<L>  /  TBili  0.6  /  DBili  x   /  AST  22  /  ALT  15  /  AlkPhos  123<H>  12-20    PT/INR - ( 19 Dec 2019 13:19 )   PT: 67.9 sec;   INR: 5.65 ratio         PTT - ( 19 Dec 2019 13:19 )  PTT:51.4 sec      Urinalysis Basic - ( 19 Dec 2019 21:41 )    Color: Yellow / Appearance: Clear / SG: >1.050 / pH: x  Gluc: x / Ketone: Negative  / Bili: Negative / Urobili: Negative   Blood: x / Protein: 100 / Nitrite: Negative   Leuk Esterase: Small / RBC: 3 /hpf / WBC 24 /HPF   Sq Epi: x / Non Sq Epi: 1 /hpf / Bacteria: Many          12-19 @ 13:19  3.7  31      Thyroid Stimulating Hormone, Serum: 6.79 uIU/mL (12-20 @ 09:12)          Consultant(s) Notes Reviewed:      Care Discussed with Consultants/Other Providers:

## 2019-12-20 NOTE — CHART NOTE - NSCHARTNOTEFT_GEN_A_CORE
Informed by RN pt with elevated INR from AM lab 5.02; coumadin on hold; no bleeding reported.        < from: CT Angio Chest w/ IV Cont (12.19.19 @ 17:30) >    LUNGS AND LARGE AIRWAYS: Patent central airways. Biapical scarring and bibasilar linear atelectasis. No pneumonia. No pneumothorax. Interlobular septal thickening noted peripherally of the lungs.  PLEURA: Trace bilateral pleural effusions.  VESSELS: No aortic dissection, penetrating aortic ulcer, or intramural hematoma. Calcified atherosclerotic tortuous thoracic aorta ascending aorta measures 4 cm. Main pulmonary artery measures 3.5 cm.  HEART: Severe right atrial enlargement. No pericardial effusion. Pacemaker.  MEDIASTINUM AND RAMON: No lymphadenopathy.  CHEST WALL AND LOWER NECK: Left chest wall dual-chamber pacemaker.    ABDOMEN AND PELVIS:    LIVER: Subcentimeter low-attenuation lesion in the left hepatic lobe, too small to characterize. Mildly prominent leftlobe.  BILE DUCTS: Normal caliber.  GALLBLADDER: Suggestion of fine sludge.  SPLEEN: Within normal limits.  PANCREAS: Within normal limits.  ADRENALS: Within normal limits.  KIDNEYS/URETERS: Multiple hypodensities within the kidneys some of which represent cysts. Some cannot be characterized    BLADDER: Within normal limits.  REPRODUCTIVE ORGANS: Uterus and adnexa within normal limits.    BOWEL: No bowel obstruction. Colonic diverticulosis without diverticulitis. Small hiatal hernia. Stool in thecolon.  PERITONEUM: No ascites.  VESSELS: Ectatic tortuous abdominal aorta with Atherosclerotic change.  RETROPERITONEUM/LYMPH NODES: No lymphadenopathy.    ABDOMINAL WALL: Within normal limits.  BONES: Acute left seventh through 10th rib fractures. Age-indeterminate T6 compression fracture. Spinal degenerative changes. Mild retrolisthesis of L1 on L2 and mild anterolisthesis of L4 and L5.    IMPRESSION: Evidence of mild peripheral interstitial pulmonary edema.    Acute left seventh through 10th rib fractures.        Vital Signs Last 24 Hrs  T(C): 36.5 (20 Dec 2019 06:20), Max: 36.9 (20 Dec 2019 03:31)  T(F): 97.7 (20 Dec 2019 06:20), Max: 98.4 (20 Dec 2019 03:31)  HR: 67 (20 Dec 2019 09:33) (60 - 73)  BP: 149/89 (20 Dec 2019 09:33) (140/85 - 172/99)  BP(mean): --  RR: 18 (20 Dec 2019 06:20) (16 - 18)  SpO2: 94% (20 Dec 2019 06:20) (94% - 100%)                        10.9   7.38  )-----------( 226      ( 20 Dec 2019 08:46 )             33.8     12-20    140  |  104  |  29<H>  ----------------------------<  69<L>  5.0   |  23  |  1.08    Ca    9.5      20 Dec 2019 07:24    TPro  6.6  /  Alb  3.1<L>  /  TBili  0.6  /  DBili  x   /  AST  22  /  ALT  15  /  AlkPhos  123<H>  12-20    PT/INR - ( 20 Dec 2019 09:20 )   PT: 60.7 sec;   INR: 5.02 ratio         PTT - ( 19 Dec 2019 13:19 )  PTT:51.4 sec Informed by RN pt with elevated INR from AM lab 5.02; coumadin on hold; no bleeding reported.   pt is resting in bed; denies any bleeding; denies  CP/SOB        Vital Signs Last 24 Hrs  T(C): 36.5 (20 Dec 2019 06:20), Max: 36.9 (20 Dec 2019 03:31)  T(F): 97.7 (20 Dec 2019 06:20), Max: 98.4 (20 Dec 2019 03:31)  HR: 67 (20 Dec 2019 09:33) (60 - 73)  BP: 149/89 (20 Dec 2019 09:33) (140/85 - 172/99)  BP(mean): --  RR: 18 (20 Dec 2019 06:20) (16 - 18)  SpO2: 94% (20 Dec 2019 06:20) (94% - 100%)                        10.9   7.38  )-----------( 226      ( 20 Dec 2019 08:46 )             33.8     12-20    140  |  104  |  29<H>  ----------------------------<  69<L>  5.0   |  23  |  1.08    Ca    9.5      20 Dec 2019 07:24    TPro  6.6  /  Alb  3.1<L>  /  TBili  0.6  /  DBili  x   /  AST  22  /  ALT  15  /  AlkPhos  123<H>  12-20    PT/INR - ( 20 Dec 2019 09:20 )   PT: 60.7 sec;   INR: 5.02 ratio         PTT - ( 19 Dec 2019 13:19 )  PTT:51.4 sec      pt is alert and oriented x 3; able to answer questions  Resp-  decreased BS at base  CVS: S1S2 RRR  GI; abdomen soft, NT, bowel sounds present  EXt: able to move all extremities. no edema      AP  89 yo F with h/o DVT,  a fib (on Coumadin), PPI, HTN, p/w chest pain, worsening x 1wk, has acute left seventh through 10th rib fractures. pain possible secondary to rib fracture;  on IV lasix for pulmonary edema; denies SOB;  Abnormal lab from   am lab-elevated INR-no bleeding; coumadin on hold; f/u by MD; noted MD ordered vit K 5mg po one dose;  f/u INR; monitor for any bleeding; d/w  pt/ family.  Lina Farley(NP)  [facility], [phone]

## 2019-12-20 NOTE — PROGRESS NOTE ADULT - ASSESSMENT
89 yo F      with   h/o      DVT.  A FIB on Coumadin), SSX,  s/p   PPM .    HTN, hypothyroid      , p/w chest pain ans  increasing   worsening x 1wk.  with incessant  cough  for past  week        admitted  with cp. from mlple rib fx's/  no h/o  trauma/ falls  suspect  fx;s are  from persistent cough/   osteoporotic bones, given age  of 89     CHF,   systolic,  acute on chronic/ not on Lasix ,  at home    lasix 20 mg iv          AFIB, on Coumadin   daily inr/  ,SSX,  s/p ppm,  on   verapamil/ losartan     echo /  ppm  interrogation      ct   head,  old  meningoma    ct    abd/ pelvis,   acute Left  7th to  10 tyh rib fx's/   T6  com fx. old/     trauma  eval noted. no intervention  pain meds/ laxatives/ incentive spirometer/  PT  eval  on dig/ lasix/ cozaar/ verapamil/ synthroid      pts  card  , per  daughter   is  in [facility]   PT eval     < from: CT Head No Cont (12.19.19 @ 21:11) >  IMPRESSION: No acute intracranial hemorrhage.  Unchanged appearing right occipital convexity calcified/ossified meningioma.  Similar-appearing microvascular disease.  < end of copied text >     < from: CT Angio Abdomen and Pelvis w/ IV Cont (12.19.19 @ 17:30) >  BONES: Acute left seventh through 10th rib fractures. Age-indeterminate T6 compression fracture. Spinal degenerative changes. Mild retrolisthesis of L1 on L2 and mild anterolisthesis of L4 and L5.  IMPRESSION: Evidence of mild peripheral interstitial pulmonary edema.  Acute left seventh through 10th rib fractures.  OLGA SADLER M.D., RADIOLOGY RESIDENT 89 yo F      with   h/o      DVT.  A FIB on Coumadin), SSX,  s/p   PPM .    HTN, hypothyroid      , p/w chest pain ans  increasing   worsening x 1wk.  with incessant  cough  for past  week        admitted  with cp. from mlple rib fx's/  no h/o  trauma/ falls  suspect  fx;s are  from persistent cough/   osteoporotic bones, given age  of 89     CHF,   systolic,  acute on chronic/ not on Lasix ,  at home    lasix 20 mg iv          AFIB, on Coumadin   daily inr/  ,SSX,  s/p ppm,  on   verapamil/ losartan     echo /  ppm  interrogation      ct   head,  old  meningoma    ct    abd/ pelvis,   acute Left  7th to  10 tyh rib fx's/   T6  com fx. old/     trauma  eval noted. no intervention  pain meds/    lidocaine patch/   laxatives/  incentive spirometer/   PT  eval  on dig/ lasix/ cozaar/ verapamil/ synthroid  AFib.  coagulopathy  on arrival/ inr  pending   follow  hb/ pending      pts  card  , per  daughter   is  in [facility]   PT eval     < from: CT Head No Cont (12.19.19 @ 21:11) >  IMPRESSION: No acute intracranial hemorrhage.  Unchanged appearing right occipital convexity calcified/ossified meningioma.  Similar-appearing microvascular disease.  < end of copied text >     < from: CT Angio Abdomen and Pelvis w/ IV Cont (12.19.19 @ 17:30) >  BONES: Acute left seventh through 10th rib fractures. Age-indeterminate T6 compression fracture. Spinal degenerative changes. Mild retrolisthesis of L1 on L2 and mild anterolisthesis of L4 and L5.  IMPRESSION: Evidence of mild peripheral interstitial pulmonary edema.  Acute left seventh through 10th rib fractures.  OLGA SADLER M.D., RADIOLOGY RESIDENT 89 yo F      with   h/o      DVT.  A FIB on Coumadin), SSX,  s/p   PPM .    HTN, hypothyroid      , p/w chest pain ans  increasing   worsening x 1wk.  with incessant  cough  for past  week        admitted  with cp. from mlple rib fx's/  no h/o  trauma/ falls  suspect  fx;s are  from persistent cough/   osteoporotic bones, given age  of 89     CHF,   systolic,  acute on chronic/ not on Lasix ,  at home    lasix 20 mg iv          AFIB, on Coumadin   daily inr/  ,SSX,  s/p ppm,  on   verapamil/ losartan     echo /  ppm  interrogation      ct   head,  old  meningoma    ct    abd/ pelvis,   acute Left  7th to  10 tyh rib fx's/   T6  com fx. old/     trauma  eval noted. no intervention  pain meds/    lidocaine patch/   laxatives/  incentive spirometer/   PT  eval  on dig/ lasix/ cozaar/ verapamil/ synthroid  AFib.  coagulopathy  on arrival/ inr   still at 5/ vitamin K  given   follow  hb/ pending      pts outside   , per  daughter  is  in [facility] / [facility]  PT eval     < from: CT Head No Cont (12.19.19 @ 21:11) >  IMPRESSION: No acute intracranial hemorrhage.  Unchanged appearing right occipital convexity calcified/ossified meningioma.  Similar-appearing microvascular disease.  < end of copied text >     < from: CT Angio Abdomen and Pelvis w/ IV Cont (12.19.19 @ 17:30) >  BONES: Acute left seventh through 10th rib fractures. Age-indeterminate T6 compression fracture. Spinal degenerative changes. Mild retrolisthesis of L1 on L2 and mild anterolisthesis of L4 and L5.  IMPRESSION: Evidence of mild peripheral interstitial pulmonary edema.  Acute left seventh through 10th rib fractures.  OLGA SADLER M.D., RADIOLOGY RESIDENT

## 2019-12-20 NOTE — DISCHARGE NOTE PROVIDER - NSDCMRMEDTOKEN_GEN_ALL_CORE_FT
ascorbic acid 500 mg oral tablet: 1 tab(s) orally once a day  biotin 5 mg oral capsule: 1 cap(s) orally once a day  calcium (as carbonate)-vitamin D 250 mg-125 intl units oral tablet: 1 tab(s) orally 2 times a day  cephalexin 500 mg oral capsule: 1 cap(s) orally 2 times a day  Coumadin 3 mg oral tablet: 1 tab(s) orally once a day  digoxin 125 mcg (0.125 mg) oral tablet: 1 tab(s) orally once a day  diltiazem 360 mg/24 hours oral tablet, extended release: 1 tab(s) orally once a day  docusate sodium 100 mg oral capsule: 1 cap(s) orally 2 times a day  furosemide 40 mg oral tablet: 1 tab(s) orally once a day  levothyroxine 75 mcg (0.075 mg) oral capsule: 1 cap(s) orally once a day  losartan 100 mg oral tablet: 1 tab(s) orally once a day  Multiple Vitamins with Minerals oral tablet: 1 tab(s) orally once a day  phenylephrine 0.25% rectal suppository: 1 suppository(ies) rectal once a day  polyethylene glycol 3350 oral powder for reconstitution: 17 gram(s) orally once a day acetaminophen 325 mg oral tablet: 2 tab(s) orally every 6 hours, As needed, Mild Pain (1 - 3), Moderate Pain (4 - 6)  bacitracin 500 units/g topical ointment: 1 application topically once a day  bisacodyl 5 mg oral delayed release tablet: 1 tab(s) orally once a day (at bedtime)  bumetanide 1 mg oral tablet: 1 tab(s) orally once a day  Coumadin 3 mg oral tablet: 1 tab(s) orally once a day  Resume 12/27/19  dilTIAZem 240 mg/24 hours oral capsule, extended release: 1 cap(s) orally once a day  guaiFENesin 100 mg/5 mL oral liquid: 5 milliliter(s) orally every 6 hours, As needed, Cough  hydrALAZINE 25 mg oral tablet: 1 tab(s) orally 2 times a day  levothyroxine 88 mcg (0.088 mg) oral tablet: 1 tab(s) orally once a day  senna oral tablet: 2 tab(s) orally once a day (at bedtime)  traMADol 50 mg oral tablet: 0.5 tab(s) orally 2 times a day, As needed, Severe Pain (7 - 10)

## 2019-12-20 NOTE — DISCHARGE NOTE PROVIDER - HOSPITAL COURSE
89 yo F with h/o DVT,  a fib (on Coumadin), PPI, HTN, p/w chest pain, worsening x 1wk, has acute left seventh through 10th rib fractures. pain possible secondary to rib fracture;    on IV lasix for pulmonary edema; seen by cardiology; echcardiogram; elevated INR- held coumadin and follow up INR to adjust coumadin dose 89 yo F with h/o DVT,  a fib (on Coumadin), PPI, HTN, p/w chest pain, worsening x 1wk, has acute left seventh through 10th rib fractures. pain possible secondary to rib fracture;    on IV lasix for pulmonary edema; seen by cardiology; echcardiogram; elevated INR- held coumadin and follow up INR to adjust coumadin dose;    RAAD: Secondary to contrast nephropathy- seen by renal 89 yo F with h/o DVT,  a fib (on Coumadin), PPI, HTN, p/w chest pain, worsening x 1wk, has acute left seventh through 10th rib fractures. pain possible secondary to rib fracture;    on IV lasix for pulmonary edema; seen by cardiology; echcardiogram; elevated INR- held coumadin and follow up INR to adjust coumadin dose;    RAAD: Secondary to contrast nephropathy- seen by renal, RAAD improving, stable for discharge

## 2019-12-20 NOTE — PROGRESS NOTE ADULT - SUBJECTIVE AND OBJECTIVE BOX
CARDIOLOGY     PROGRESS  NOTE   ________________________________________________    CHIEF COMPLAINT:Patient is a 89y old  Female who presents with a chief complaint of chest pain/sob (19 Dec 2019 21:15)  c/o pain.  	  REVIEW OF SYSTEMS:  CONSTITUTIONAL: No fever, weight loss, or fatigue  EYES: No eye pain, visual disturbances, or discharge  ENT:  No difficulty hearing, tinnitus, vertigo; No sinus or throat pain  NECK: No pain or stiffness  RESPIRATORY: No cough, wheezing, chills or hemoptysis; + mild  Shortness of Breath  CARDIOVASCULAR: No chest pain, palpitations, passing out, dizziness, or leg swelling  GASTROINTESTINAL: No abdominal or epigastric pain. No nausea, vomiting, or hematemesis; No diarrhea or constipation. No melena or hematochezia.  GENITOURINARY: No dysuria, frequency, hematuria, or incontinence  NEUROLOGICAL: No headaches, memory loss, loss of strength, numbness, or tremors  SKIN: No itching, burning, rashes, or lesions   LYMPH Nodes: No enlarged glands  ENDOCRINE: No heat or cold intolerance; No hair loss  MUSCULOSKELETAL: No joint pain or swelling; No muscle, back, or extremity pain  PSYCHIATRIC: No depression, anxiety, mood swings, or difficulty sleeping  HEME/LYMPH: No easy bruising, or bleeding gums  ALLERGY AND IMMUNOLOGIC: No hives or eczema	    [ ] All others negative	  [ ] Unable to obtain    PHYSICAL EXAM:  T(C): 36.5 (12-20-19 @ 06:20), Max: 36.9 (12-20-19 @ 03:31)  HR: 64 (12-20-19 @ 06:20) (60 - 73)  BP: 157/95 (12-20-19 @ 06:20) (140/85 - 172/99)  RR: 18 (12-20-19 @ 06:20) (16 - 18)  SpO2: 94% (12-20-19 @ 06:20) (94% - 100%)  Wt(kg): --  I&O's Summary    19 Dec 2019 07:01  -  20 Dec 2019 07:00  --------------------------------------------------------  IN: 120 mL / OUT: 0 mL / NET: 120 mL        Appearance: Normal	  HEENT:   Normal oral mucosa, PERRL, EOMI	  Lymphatic: No lymphadenopathy  Cardiovascular: Normal S1 S2, No JVD, + murmurs, No edema  Respiratory: rales  Psychiatry: A & O x 3, Mood & affect appropriate  Gastrointestinal:  Soft, Non-tender, + BS	  Skin: No rashes, No ecchymoses, No cyanosis	  Neurologic: Non-focal  Extremities: Normal range of motion, No clubbing, cyanosis or edema  Vascular: Peripheral pulses palpable 2+ bilaterally    MEDICATIONS  (STANDING):  digoxin     Tablet 0.125 milliGRAM(s) Oral daily  furosemide   Injectable 20 milliGRAM(s) IV Push two times a day  levothyroxine 88 MICROGram(s) Oral daily  lidocaine   Patch 1 Patch Transdermal daily  losartan 100 milliGRAM(s) Oral daily  multivitamin/minerals 1 Tablet(s) Oral daily  verapamil  milliGRAM(s) Oral daily      TELEMETRY: 	    ECG:  	  RADIOLOGY:  OTHER: 	  	  LABS:	 	    CARDIAC MARKERS:                                12.3   9.98  )-----------( 208      ( 19 Dec 2019 13:19 )             36.5     12-20    140  |  104  |  29<H>  ----------------------------<  69<L>  5.0   |  23  |  1.08    Ca    9.5      20 Dec 2019 07:24    TPro  6.6  /  Alb  3.1<L>  /  TBili  0.6  /  DBili  x   /  AST  22  /  ALT  15  /  AlkPhos  123<H>  12-20    proBNP: Serum Pro-Brain Natriuretic Peptide: 5543 pg/mL (12-19 @ 13:19)    Lipid Profile:   HgA1c:   TSH:   PT/INR - ( 19 Dec 2019 13:19 )   PT: 67.9 sec;   INR: 5.65 ratio         PTT - ( 19 Dec 2019 13:19 )  PTT:51.4 sec      Assessment and plan  ---------------------------  chf/pulmonary edema ? HFPEF  awaiting echo  continue cardiac meds  continue lasix iv for now   ac for a.fib ,needs to check ppm  follow up electrolyte

## 2019-12-20 NOTE — DISCHARGE NOTE PROVIDER - NSDCCPCAREPLAN_GEN_ALL_CORE_FT
PRINCIPAL DISCHARGE DIAGNOSIS  Diagnosis: Chest pain at rest  Assessment and Plan of Treatment: likely musculoskeletal      SECONDARY DISCHARGE DIAGNOSES  Diagnosis: Acute pancreatitis, unspecified complication status, unspecified pancreatitis type  Assessment and Plan of Treatment:     Diagnosis: Coagulation defect  Assessment and Plan of Treatment: PRINCIPAL DISCHARGE DIAGNOSIS  Diagnosis: Chest pain at rest  Assessment and Plan of Treatment: likely musculoskeletal; mlple rib fx's/  no h/o  trauma/ falls  suspect  fx;s are  from persistent cough/ multiple rib fractures      SECONDARY DISCHARGE DIAGNOSES  Diagnosis: Acute pancreatitis, unspecified complication status, unspecified pancreatitis type  Assessment and Plan of Treatment:     Diagnosis: Coagulation defect  Assessment and Plan of Treatment: coumadin dose adjusted  check INR to dose coumadin

## 2019-12-20 NOTE — DISCHARGE NOTE PROVIDER - CARE PROVIDER_API CALL
Christy Malone (DO)  Cardiovascular Disease  [address], [address]  [place] [address]  Phone: [phone]  Fax: [phone]  Follow Up Time:

## 2019-12-21 LAB
ANION GAP SERPL CALC-SCNC: 12 MMOL/L — SIGNIFICANT CHANGE UP (ref 5–17)
BUN SERPL-MCNC: 40 MG/DL — HIGH (ref 7–23)
CALCIUM SERPL-MCNC: 9.8 MG/DL — SIGNIFICANT CHANGE UP (ref 8.4–10.5)
CHLORIDE SERPL-SCNC: 103 MMOL/L — SIGNIFICANT CHANGE UP (ref 96–108)
CO2 SERPL-SCNC: 26 MMOL/L — SIGNIFICANT CHANGE UP (ref 22–31)
CREAT SERPL-MCNC: 1.66 MG/DL — HIGH (ref 0.5–1.3)
GLUCOSE SERPL-MCNC: 77 MG/DL — SIGNIFICANT CHANGE UP (ref 70–99)
HCT VFR BLD CALC: 34.2 % — LOW (ref 34.5–45)
HGB BLD-MCNC: 11 G/DL — LOW (ref 11.5–15.5)
INR BLD: 1.46 RATIO — HIGH (ref 0.88–1.16)
MCHC RBC-ENTMCNC: 23.1 PG — LOW (ref 27–34)
MCHC RBC-ENTMCNC: 32.2 GM/DL — SIGNIFICANT CHANGE UP (ref 32–36)
MCV RBC AUTO: 71.7 FL — LOW (ref 80–100)
PLATELET # BLD AUTO: 226 K/UL — SIGNIFICANT CHANGE UP (ref 150–400)
POTASSIUM SERPL-MCNC: 4.2 MMOL/L — SIGNIFICANT CHANGE UP (ref 3.5–5.3)
POTASSIUM SERPL-SCNC: 4.2 MMOL/L — SIGNIFICANT CHANGE UP (ref 3.5–5.3)
PROTHROM AB SERPL-ACNC: 16.8 SEC — HIGH (ref 10–13.1)
RBC # BLD: 4.77 M/UL — SIGNIFICANT CHANGE UP (ref 3.8–5.2)
RBC # FLD: 16.4 % — HIGH (ref 10.3–14.5)
SODIUM SERPL-SCNC: 141 MMOL/L — SIGNIFICANT CHANGE UP (ref 135–145)
WBC # BLD: 10.67 K/UL — HIGH (ref 3.8–10.5)
WBC # FLD AUTO: 10.67 K/UL — HIGH (ref 3.8–10.5)

## 2019-12-21 RX ORDER — ENOXAPARIN SODIUM 100 MG/ML
70 INJECTION SUBCUTANEOUS ONCE
Refills: 0 | Status: COMPLETED | OUTPATIENT
Start: 2019-12-21 | End: 2019-12-21

## 2019-12-21 RX ORDER — FUROSEMIDE 40 MG
20 TABLET ORAL DAILY
Refills: 0 | Status: DISCONTINUED | OUTPATIENT
Start: 2019-12-21 | End: 2019-12-22

## 2019-12-21 RX ORDER — POLYETHYLENE GLYCOL 3350 17 G/17G
17 POWDER, FOR SOLUTION ORAL DAILY
Refills: 0 | Status: DISCONTINUED | OUTPATIENT
Start: 2019-12-21 | End: 2019-12-22

## 2019-12-21 RX ORDER — WARFARIN SODIUM 2.5 MG/1
6 TABLET ORAL ONCE
Refills: 0 | Status: COMPLETED | OUTPATIENT
Start: 2019-12-21 | End: 2019-12-21

## 2019-12-21 RX ADMIN — POLYETHYLENE GLYCOL 3350 17 GRAM(S): 17 POWDER, FOR SOLUTION ORAL at 17:55

## 2019-12-21 RX ADMIN — SENNA PLUS 2 TABLET(S): 8.6 TABLET ORAL at 21:28

## 2019-12-21 RX ADMIN — WARFARIN SODIUM 6 MILLIGRAM(S): 2.5 TABLET ORAL at 21:28

## 2019-12-21 RX ADMIN — Medication 0.12 MILLIGRAM(S): at 05:41

## 2019-12-21 RX ADMIN — LIDOCAINE 1 PATCH: 4 CREAM TOPICAL at 14:10

## 2019-12-21 RX ADMIN — LOSARTAN POTASSIUM 100 MILLIGRAM(S): 100 TABLET, FILM COATED ORAL at 05:40

## 2019-12-21 RX ADMIN — ENOXAPARIN SODIUM 70 MILLIGRAM(S): 100 INJECTION SUBCUTANEOUS at 17:51

## 2019-12-21 RX ADMIN — Medication 88 MICROGRAM(S): at 05:40

## 2019-12-21 RX ADMIN — Medication 650 MILLIGRAM(S): at 12:10

## 2019-12-21 RX ADMIN — Medication 240 MILLIGRAM(S): at 05:40

## 2019-12-21 RX ADMIN — Medication 20 MILLIGRAM(S): at 05:41

## 2019-12-21 RX ADMIN — LIDOCAINE 1 PATCH: 4 CREAM TOPICAL at 20:20

## 2019-12-21 RX ADMIN — Medication 650 MILLIGRAM(S): at 12:38

## 2019-12-21 NOTE — PHYSICAL THERAPY INITIAL EVALUATION ADULT - CRITERIA FOR SKILLED THERAPEUTIC INTERVENTIONS
rehab potential/impairments found/therapy frequency/anticipated discharge recommendation/predicted duration of therapy intervention/anticipated equipment needs at discharge/risk reduction/prevention/functional limitations in following categories

## 2019-12-21 NOTE — PROGRESS NOTE ADULT - ASSESSMENT
89 yo F      with   h/o      DVT.  A FIB on Coumadin), SSX,  s/p   PPM .    HTN, hypothyroid      , p/w chest pain ans  increasing   worsening x 1wk.  with incessant  cough  for past  week        admitted  with cp. from mlple rib fx's/  no h/o  trauma/ falls  suspect  fx;s are  from persistent cough/   osteoporotic bones, given age  of 89     CHF,   systolic,  acute on chronic/ not on Lasix ,  at home    lasix 20 mg iv          AFIB, on Coumadin   daily inr/  ,SSX,  s/p ppm,  on   verapamil/ losartan     echo /  ppm  interrogation      ct   head,  old  meningoma    ct    abd/ pelvis,   acute Left  7th to  10 tyh rib fx's/   T6  com fx. old/     trauma  eval noted. no intervention  pain meds/    lidocaine patch/   laxatives/  incentive spirometer/   PT  eval  on dig/ lasix/ cozaar/ verapamil/ synthroid  AFib.  coagulopathy  on arrival/ inr   still at 5/ vitamin K  given   follow inr/  pending now   RAAD,  could  be from lasix    check bladder scan       per  daughter/ pts  dr little  in [facility] / [facility]  PT eval     < from: CT Head No Cont (12.19.19 @ 21:11) >  IMPRESSION: No acute intracranial hemorrhage.  Unchanged appearing right occipital convexity calcified/ossified meningioma.  Similar-appearing microvascular disease.  < end of copied text >     < from: CT Angio Abdomen and Pelvis w/ IV Cont (12.19.19 @ 17:30) >  BONES: Acute left seventh through 10th rib fractures. Age-indeterminate T6 compression fracture. Spinal degenerative changes. Mild retrolisthesis of L1 on L2 and mild anterolisthesis of L4 and L5.  IMPRESSION: Evidence of mild peripheral interstitial pulmonary edema.  Acute left seventh through 10th rib fractures.  OLGA SADELR M.D., RADIOLOGY RESIDENT

## 2019-12-21 NOTE — PHYSICAL THERAPY INITIAL EVALUATION ADULT - PERTINENT HX OF CURRENT PROBLEM, REHAB EVAL
89 yo F with  a fib (on Coumadin), PPI, HTN, p/w chest pain, worsening x 1wk, has acute left seventh through 10th rib fractures.

## 2019-12-21 NOTE — PROGRESS NOTE ADULT - SUBJECTIVE AND OBJECTIVE BOX
CARDIOLOGY     PROGRESS  NOTE   ________________________________________________    CHIEF COMPLAINT:Patient is a 89y old  Female who presents with a chief complaint of chest pain/sob (20 Dec 2019 15:45)  no complain.  	  REVIEW OF SYSTEMS:  CONSTITUTIONAL: No fever, weight loss, or fatigue  EYES: No eye pain, visual disturbances, or discharge  ENT:  No difficulty hearing, tinnitus, vertigo; No sinus or throat pain  NECK: No pain or stiffness  RESPIRATORY: No cough, wheezing, chills or hemoptysis; decrease  Shortness of Breath  CARDIOVASCULAR: No chest pain, palpitations, passing out, dizziness, or leg swelling  GASTROINTESTINAL: No abdominal or epigastric pain. No nausea, vomiting, or hematemesis; No diarrhea or constipation. No melena or hematochezia.  GENITOURINARY: No dysuria, frequency, hematuria, or incontinence  NEUROLOGICAL: No headaches, memory loss, loss of strength, numbness, or tremors  SKIN: No itching, burning, rashes, or lesions   LYMPH Nodes: No enlarged glands  ENDOCRINE: No heat or cold intolerance; No hair loss  MUSCULOSKELETAL: No joint pain or swelling; No muscle, back, or extremity pain  PSYCHIATRIC: No depression, anxiety, mood swings, or difficulty sleeping  HEME/LYMPH: No easy bruising, or bleeding gums  ALLERGY AND IMMUNOLOGIC: No hives or eczema	    [ ] All others negative	  [ ] Unable to obtain    PHYSICAL EXAM:  T(C): 36.7 (12-21-19 @ 04:44), Max: 36.9 (12-20-19 @ 13:47)  HR: 64 (12-21-19 @ 09:29) (63 - 66)  BP: 146/96 (12-21-19 @ 09:29) (110/72 - 146/96)  RR: 18 (12-21-19 @ 04:44) (18 - 18)  SpO2: 96% (12-21-19 @ 04:44) (95% - 96%)  Wt(kg): --  I&O's Summary    20 Dec 2019 07:01  -  21 Dec 2019 07:00  --------------------------------------------------------  IN: 900 mL / OUT: 0 mL / NET: 900 mL        Appearance: Normal	  HEENT:   Normal oral mucosa, PERRL, EOMI	  Lymphatic: No lymphadenopathy  Cardiovascular: Normal S1 S2, No JVD, +murmurs, No edema  Respiratory: rhonchi  Psychiatry: A & O x 3, Mood & affect appropriate  Gastrointestinal:  Soft, Non-tender, + BS	  Skin: No rashes, No ecchymoses, No cyanosis	  Neurologic: Non-focal  Extremities: Normal range of motion, No clubbing, cyanosis or edema  Vascular: Peripheral pulses palpable 2+ bilaterally    MEDICATIONS  (STANDING):  digoxin     Tablet 0.125 milliGRAM(s) Oral daily  furosemide   Injectable 20 milliGRAM(s) IV Push two times a day  levothyroxine 88 MICROGram(s) Oral daily  lidocaine   Patch 1 Patch Transdermal daily  losartan 100 milliGRAM(s) Oral daily  senna 2 Tablet(s) Oral at bedtime  verapamil  milliGRAM(s) Oral daily      TELEMETRY: 	    ECG:  	  RADIOLOGY:  OTHER: 	  	  LABS:	 	    CARDIAC MARKERS:                                11.3   7.45  )-----------( 222      ( 20 Dec 2019 11:16 )             35.5     12-21    141  |  103  |  40<H>  ----------------------------<  77  4.2   |  26  |  1.66<H>    Ca    9.8      21 Dec 2019 07:23    TPro  6.6  /  Alb  3.1<L>  /  TBili  0.6  /  DBili  x   /  AST  22  /  ALT  15  /  AlkPhos  123<H>  12-20    proBNP: Serum Pro-Brain Natriuretic Peptide: 5543 pg/mL (12-19 @ 13:19)    Lipid Profile:   HgA1c:   TSH: Thyroid Stimulating Hormone, Serum: 6.79 uIU/mL (12-20 @ 09:12)    PT/INR - ( 20 Dec 2019 11:14 )   PT: 54.9 sec;   INR: 4.56 ratio         PTT - ( 19 Dec 2019 13:19 )  PTT:51.4 sec      Assessment and plan  ---------------------------    chf/pulmonary edema ? HFPEF  awaiting echo  continue cardiac meds  continue lasix iv for now   ac for gómez ,needs to check ppm  follow up electrolyte  repeat chest on Monday  physical therapy  aspiration percussion  fu INR  increase renal function , check bladder scan ? sec to iv contrast  decrease Lasix to daily  repeat in am tracee

## 2019-12-21 NOTE — PHYSICAL THERAPY INITIAL EVALUATION ADULT - ADDITIONAL COMMENTS
Pt lives in a private house alone w/ 14 steps to neg. Pt amb w/ RW PTA. Pt was independent with ADL's.

## 2019-12-21 NOTE — PROGRESS NOTE ADULT - SUBJECTIVE AND OBJECTIVE BOX
no cp    REVIEW OF SYSTEMS:  GEN: no fever,    no chills  RESP: no SOB,   no cough  CVS: no chest pain,   no palpitations  GI: no abdominal pain,   no nausea,   no vomiting,   no constipation,   no diarrhea  : no dysuria,   no frequency  NEURO: no headache,   no dizziness  PSYCH: no depression,   not anxious  Derm : no rash    MEDICATIONS  (STANDING):  digoxin     Tablet 0.125 milliGRAM(s) Oral daily  furosemide   Injectable 20 milliGRAM(s) IV Push daily  levothyroxine 88 MICROGram(s) Oral daily  lidocaine   Patch 1 Patch Transdermal daily  losartan 100 milliGRAM(s) Oral daily  senna 2 Tablet(s) Oral at bedtime  verapamil  milliGRAM(s) Oral daily    MEDICATIONS  (PRN):  acetaminophen   Tablet .. 650 milliGRAM(s) Oral every 6 hours PRN Mild Pain (1 - 3), Moderate Pain (4 - 6)  traMADol 25 milliGRAM(s) Oral two times a day PRN Severe Pain (7 - 10)      Vital Signs Last 24 Hrs  T(C): 36.7 (21 Dec 2019 04:44), Max: 36.9 (20 Dec 2019 13:47)  T(F): 98.1 (21 Dec 2019 04:44), Max: 98.4 (20 Dec 2019 13:47)  HR: 64 (21 Dec 2019 09:29) (63 - 66)  BP: 146/96 (21 Dec 2019 09:29) (110/72 - 146/96)  BP(mean): --  RR: 18 (21 Dec 2019 04:44) (18 - 18)  SpO2: 96% (21 Dec 2019 04:44) (95% - 96%)  CAPILLARY BLOOD GLUCOSE        I&O's Summary    20 Dec 2019 07:01  -  21 Dec 2019 07:00  --------------------------------------------------------  IN: 900 mL / OUT: 0 mL / NET: 900 mL        PHYSICAL EXAM:  HEAD:  Atraumatic, Normocephalic  NECK: Supple, No   JVD  CHEST/LUNG:   no     rales,     no,    rhonchi  HEART: Regular rate and rhythm;         murmur  ABDOMEN: Soft, Nontender, ;   EXTREMITIES:        edema  NEUROLOGY:  alert    LABS:                        11.0   10.67 )-----------( 226      ( 21 Dec 2019 10:00 )             34.2     12-21    141  |  103  |  40<H>  ----------------------------<  77  4.2   |  26  |  1.66<H>    Ca    9.8      21 Dec 2019 07:23    TPro  6.6  /  Alb  3.1<L>  /  TBili  0.6  /  DBili  x   /  AST  22  /  ALT  15  /  AlkPhos  123<H>  12-20    PT/INR - ( 20 Dec 2019 11:14 )   PT: 54.9 sec;   INR: 4.56 ratio         PTT - ( 19 Dec 2019 13:19 )  PTT:51.4 sec      Urinalysis Basic - ( 19 Dec 2019 21:41 )    Color: Yellow / Appearance: Clear / SG: >1.050 / pH: x  Gluc: x / Ketone: Negative  / Bili: Negative / Urobili: Negative   Blood: x / Protein: 100 / Nitrite: Negative   Leuk Esterase: Small / RBC: 3 /hpf / WBC 24 /HPF   Sq Epi: x / Non Sq Epi: 1 /hpf / Bacteria: Many          12-19 @ 13:19  3.7  31      Thyroid Stimulating Hormone, Serum: 6.79 uIU/mL (12-20 @ 09:12)          Consultant(s) Notes Reviewed:      Care Discussed with Consultants/Other Providers:

## 2019-12-22 LAB
ANION GAP SERPL CALC-SCNC: 13 MMOL/L — SIGNIFICANT CHANGE UP (ref 5–17)
BUN SERPL-MCNC: 57 MG/DL — HIGH (ref 7–23)
CALCIUM SERPL-MCNC: 9.4 MG/DL — SIGNIFICANT CHANGE UP (ref 8.4–10.5)
CHLORIDE SERPL-SCNC: 97 MMOL/L — SIGNIFICANT CHANGE UP (ref 96–108)
CO2 SERPL-SCNC: 24 MMOL/L — SIGNIFICANT CHANGE UP (ref 22–31)
CREAT SERPL-MCNC: 2.09 MG/DL — HIGH (ref 0.5–1.3)
GLUCOSE SERPL-MCNC: 75 MG/DL — SIGNIFICANT CHANGE UP (ref 70–99)
INR BLD: 1.1 RATIO — SIGNIFICANT CHANGE UP (ref 0.88–1.16)
POTASSIUM SERPL-MCNC: 4.9 MMOL/L — SIGNIFICANT CHANGE UP (ref 3.5–5.3)
POTASSIUM SERPL-SCNC: 4.9 MMOL/L — SIGNIFICANT CHANGE UP (ref 3.5–5.3)
PROTHROM AB SERPL-ACNC: 12.7 SEC — SIGNIFICANT CHANGE UP (ref 10–13.1)
SODIUM SERPL-SCNC: 134 MMOL/L — LOW (ref 135–145)

## 2019-12-22 RX ORDER — ENOXAPARIN SODIUM 100 MG/ML
40 INJECTION SUBCUTANEOUS ONCE
Refills: 0 | Status: DISCONTINUED | OUTPATIENT
Start: 2019-12-22 | End: 2019-12-22

## 2019-12-22 RX ORDER — HYDRALAZINE HCL 50 MG
20 TABLET ORAL THREE TIMES A DAY
Refills: 0 | Status: DISCONTINUED | OUTPATIENT
Start: 2019-12-22 | End: 2019-12-26

## 2019-12-22 RX ORDER — ENOXAPARIN SODIUM 100 MG/ML
30 INJECTION SUBCUTANEOUS ONCE
Refills: 0 | Status: COMPLETED | OUTPATIENT
Start: 2019-12-22 | End: 2019-12-22

## 2019-12-22 RX ORDER — WARFARIN SODIUM 2.5 MG/1
7.5 TABLET ORAL ONCE
Refills: 0 | Status: COMPLETED | OUTPATIENT
Start: 2019-12-22 | End: 2019-12-22

## 2019-12-22 RX ADMIN — Medication 20 MILLIGRAM(S): at 05:47

## 2019-12-22 RX ADMIN — Medication 5 MILLIGRAM(S): at 21:09

## 2019-12-22 RX ADMIN — ENOXAPARIN SODIUM 30 MILLIGRAM(S): 100 INJECTION SUBCUTANEOUS at 13:13

## 2019-12-22 RX ADMIN — TRAMADOL HYDROCHLORIDE 25 MILLIGRAM(S): 50 TABLET ORAL at 04:42

## 2019-12-22 RX ADMIN — Medication 240 MILLIGRAM(S): at 05:46

## 2019-12-22 RX ADMIN — LIDOCAINE 1 PATCH: 4 CREAM TOPICAL at 19:41

## 2019-12-22 RX ADMIN — LIDOCAINE 1 PATCH: 4 CREAM TOPICAL at 02:00

## 2019-12-22 RX ADMIN — LIDOCAINE 1 PATCH: 4 CREAM TOPICAL at 13:13

## 2019-12-22 RX ADMIN — TRAMADOL HYDROCHLORIDE 25 MILLIGRAM(S): 50 TABLET ORAL at 03:33

## 2019-12-22 RX ADMIN — Medication 0.12 MILLIGRAM(S): at 05:47

## 2019-12-22 RX ADMIN — Medication 88 MICROGRAM(S): at 05:46

## 2019-12-22 RX ADMIN — LOSARTAN POTASSIUM 100 MILLIGRAM(S): 100 TABLET, FILM COATED ORAL at 05:46

## 2019-12-22 RX ADMIN — WARFARIN SODIUM 7.5 MILLIGRAM(S): 2.5 TABLET ORAL at 21:09

## 2019-12-22 RX ADMIN — SENNA PLUS 2 TABLET(S): 8.6 TABLET ORAL at 21:09

## 2019-12-22 NOTE — PROGRESS NOTE ADULT - SUBJECTIVE AND OBJECTIVE BOX
ok at rest    REVIEW OF SYSTEMS:  GEN: no fever,    no chills  RESP: no SOB,   no cough  CVS: no chest pain,   no palpitations  GI: no abdominal pain,   no nausea,   no vomiting,   no constipation,   no diarrhea  : no dysuria,   no frequency  NEURO: no headache,   no dizziness  PSYCH: no depression,   not anxious  Derm : no rash    MEDICATIONS  (STANDING):  bisacodyl 5 milliGRAM(s) Oral at bedtime  digoxin     Tablet 0.125 milliGRAM(s) Oral daily  levothyroxine 88 MICROGram(s) Oral daily  lidocaine   Patch 1 Patch Transdermal daily  losartan 100 milliGRAM(s) Oral daily  senna 2 Tablet(s) Oral at bedtime  verapamil  milliGRAM(s) Oral daily    MEDICATIONS  (PRN):  acetaminophen   Tablet .. 650 milliGRAM(s) Oral every 6 hours PRN Mild Pain (1 - 3), Moderate Pain (4 - 6)  traMADol 25 milliGRAM(s) Oral two times a day PRN Severe Pain (7 - 10)      Vital Signs Last 24 Hrs  T(C): 36.4 (22 Dec 2019 04:34), Max: 36.5 (21 Dec 2019 12:31)  T(F): 97.6 (22 Dec 2019 04:34), Max: 97.7 (21 Dec 2019 12:31)  HR: 62 (22 Dec 2019 04:34) (56 - 64)  BP: 138/76 (22 Dec 2019 04:34) (108/66 - 146/96)  BP(mean): --  RR: 18 (22 Dec 2019 04:34) (18 - 20)  SpO2: 95% (22 Dec 2019 04:34) (92% - 95%)  CAPILLARY BLOOD GLUCOSE        I&O's Summary    21 Dec 2019 07:01  -  22 Dec 2019 07:00  --------------------------------------------------------  IN: 1460 mL / OUT: 200 mL / NET: 1260 mL        PHYSICAL EXAM:  HEAD:  Atraumatic, Normocephalic  NECK: Supple, No   JVD  CHEST/LUNG:   no     rales,     no,    rhonchi  HEART: Regular rate and rhythm;         murmur  ABDOMEN: Soft, Nontender, ;   EXTREMITIES:    no    edema  NEUROLOGY:  alert    LABS:                        11.0   10.67 )-----------( 226      ( 21 Dec 2019 10:00 )             34.2     12-22    134<L>  |  97  |  57<H>  ----------------------------<  75  4.9   |  24  |  2.09<H>    Ca    9.4      22 Dec 2019 06:33      PT/INR - ( 21 Dec 2019 10:00 )   PT: 16.8 sec;   INR: 1.46 ratio                         Thyroid Stimulating Hormone, Serum: 6.79 uIU/mL (12-20 @ 09:12)          Consultant(s) Notes Reviewed:      Care Discussed with Consultants/Other Providers:

## 2019-12-22 NOTE — PROGRESS NOTE ADULT - ASSESSMENT
89 yo F      with   h/o      DVT.  A FIB on Coumadin), SSX,  s/p   PPM .    HTN, hypothyroid      , p/w chest pain ans  increasing   worsening x 1wk.  with incessant  cough  for past  week        admitted  with cp. from mlple rib fx's/  no h/o  trauma/ falls  suspect  fx;s are  from persistent cough/   osteoporotic bones, given age  of 89     CHF,   systolic,  acute on chronic/ not on Lasix ,  at home    lasix 20 mg iv          AFIB, on Coumadin   daily inr/  ,SSX,  s/p ppm,  on   verapamil/ losartan     echo /  ppm  interrogation      ct   head,  old  meningoma    ct    abd/ pelvis,   acute Left  7th to  10 tyh rib fx's/   T6  com fx. old/     trauma  eval noted. no intervention  pain meds/    lidocaine patch/   laxatives/  incentive spirometer/   PT  eval  on dig/ lasix/ cozaar/ verapamil/ synthroid at home  AFib.  coagulopathy  on arrival/ inr   still at 5/ vitamin K  given   follow inr/  pending now   RAAD,  could  be from lasix,  stopped  creatinine of  2/  dig stopped    check bladder scan/ bmp in am       per  daughter/ pts  dr little  in [facility] / cindy [facility]  PT eval     < from: CT Head No Cont (12.19.19 @ 21:11) >  IMPRESSION: No acute intracranial hemorrhage.  Unchanged appearing right occipital convexity calcified/ossified meningioma.  Similar-appearing microvascular disease.  < end of copied text >     < from: CT Angio Abdomen and Pelvis w/ IV Cont (12.19.19 @ 17:30) >  BONES: Acute left seventh through 10th rib fractures. Age-indeterminate T6 compression fracture. Spinal degenerative changes. Mild retrolisthesis of L1 on L2 and mild anterolisthesis of L4 and L5.  IMPRESSION: Evidence of mild peripheral interstitial pulmonary edema.  Acute left seventh through 10th rib fractures.  OLGA SADLER M.D., RADIOLOGY RESIDENT

## 2019-12-22 NOTE — PROGRESS NOTE ADULT - SUBJECTIVE AND OBJECTIVE BOX
CARDIOLOGY     PROGRESS  NOTE   ________________________________________________    CHIEF COMPLAINT:Patient is a 89y old  Female who presents with a chief complaint of chest pain/sob (22 Dec 2019 09:18)  no complain.  	  REVIEW OF SYSTEMS:  CONSTITUTIONAL: No fever, weight loss, or fatigue  EYES: No eye pain, visual disturbances, or discharge  ENT:  No difficulty hearing, tinnitus, vertigo; No sinus or throat pain  NECK: No pain or stiffness  RESPIRATORY: No cough, wheezing, chills or hemoptysis; decrease  Shortness of Breath  CARDIOVASCULAR: No chest pain, palpitations, passing out, dizziness, or leg swelling  GASTROINTESTINAL: No abdominal or epigastric pain. No nausea, vomiting, or hematemesis; No diarrhea or constipation. No melena or hematochezia.  GENITOURINARY: No dysuria, frequency, hematuria, or incontinence  NEUROLOGICAL: No headaches, memory loss, loss of strength, numbness, or tremors  SKIN: No itching, burning, rashes, or lesions   LYMPH Nodes: No enlarged glands  ENDOCRINE: No heat or cold intolerance; No hair loss  MUSCULOSKELETAL: No joint pain or swelling; No muscle, back, or extremity pain  PSYCHIATRIC: No depression, anxiety, mood swings, or difficulty sleeping  HEME/LYMPH: No easy bruising, or bleeding gums  ALLERGY AND IMMUNOLOGIC: No hives or eczema	    [ ] All others negative	  [ ] Unable to obtain    PHYSICAL EXAM:  T(C): 36.4 (12-22-19 @ 04:34), Max: 36.5 (12-21-19 @ 12:31)  HR: 62 (12-22-19 @ 04:34) (56 - 64)  BP: 138/76 (12-22-19 @ 04:34) (108/66 - 146/96)  RR: 18 (12-22-19 @ 04:34) (18 - 20)  SpO2: 95% (12-22-19 @ 04:34) (92% - 95%)  Wt(kg): --  I&O's Summary    21 Dec 2019 07:01  -  22 Dec 2019 07:00  --------------------------------------------------------  IN: 1460 mL / OUT: 200 mL / NET: 1260 mL        Appearance: Normal	  HEENT:   Normal oral mucosa, PERRL, EOMI	  Lymphatic: No lymphadenopathy  Cardiovascular: Normal S1 S2, No JVD, + murmurs, No edema  Respiratory: Lungs clear to auscultation	  Psychiatry: A & O x 3, Mood & affect appropriate  Gastrointestinal:  Soft, Non-tender, + BS	  Skin: No rashes, No ecchymoses, No cyanosis	  Neurologic: Non-focal  Extremities: Normal range of motion, No clubbing, cyanosis or edema  Vascular: Peripheral pulses palpable 2+ bilaterally    MEDICATIONS  (STANDING):  bisacodyl 5 milliGRAM(s) Oral at bedtime  levothyroxine 88 MICROGram(s) Oral daily  lidocaine   Patch 1 Patch Transdermal daily  losartan 100 milliGRAM(s) Oral daily  senna 2 Tablet(s) Oral at bedtime  verapamil  milliGRAM(s) Oral daily      TELEMETRY: 	    ECG:  	  RADIOLOGY:  OTHER: 	  	  LABS:	 	    CARDIAC MARKERS:                                11.0   10.67 )-----------( 226      ( 21 Dec 2019 10:00 )             34.2     12-22    134<L>  |  97  |  57<H>  ----------------------------<  75  4.9   |  24  |  2.09<H>    Ca    9.4      22 Dec 2019 06:33      proBNP: Serum Pro-Brain Natriuretic Peptide: 5543 pg/mL (12-19 @ 13:19)    Lipid Profile:   HgA1c:   TSH: Thyroid Stimulating Hormone, Serum: 6.79 uIU/mL (12-20 @ 09:12)    PT/INR - ( 21 Dec 2019 10:00 )   PT: 16.8 sec;   INR: 1.46 ratio               Assessment and plan  ---------------------------  chf/pulmonary edema ? HFPEF  awaiting echo  continue cardiac meds  continue lasix iv for now   ac for ami ,needs to check ppm  physical therapy  aspiration percussion  fu INR  increase renal function , check bladder scan ? sec to iv contrast  decrease Lasix to daily, will may need to hold losartan sec to worsening renal function  check chest x ray  repeat in am lytes  awaiting echo

## 2019-12-23 LAB
ANION GAP SERPL CALC-SCNC: 11 MMOL/L — SIGNIFICANT CHANGE UP (ref 5–17)
BUN SERPL-MCNC: 62 MG/DL — HIGH (ref 7–23)
CALCIUM SERPL-MCNC: 9.3 MG/DL — SIGNIFICANT CHANGE UP (ref 8.4–10.5)
CHLORIDE SERPL-SCNC: 98 MMOL/L — SIGNIFICANT CHANGE UP (ref 96–108)
CO2 SERPL-SCNC: 23 MMOL/L — SIGNIFICANT CHANGE UP (ref 22–31)
CREAT SERPL-MCNC: 2.25 MG/DL — HIGH (ref 0.5–1.3)
GLUCOSE SERPL-MCNC: 78 MG/DL — SIGNIFICANT CHANGE UP (ref 70–99)
INR BLD: 1.26 RATIO — HIGH (ref 0.88–1.16)
POTASSIUM SERPL-MCNC: 4.8 MMOL/L — SIGNIFICANT CHANGE UP (ref 3.5–5.3)
POTASSIUM SERPL-SCNC: 4.8 MMOL/L — SIGNIFICANT CHANGE UP (ref 3.5–5.3)
PROTHROM AB SERPL-ACNC: 14.3 SEC — HIGH (ref 10–13.1)
SODIUM SERPL-SCNC: 132 MMOL/L — LOW (ref 135–145)

## 2019-12-23 PROCEDURE — 71045 X-RAY EXAM CHEST 1 VIEW: CPT | Mod: 26

## 2019-12-23 RX ORDER — BACITRACIN ZINC 500 UNIT/G
1 OINTMENT IN PACKET (EA) TOPICAL DAILY
Refills: 0 | Status: DISCONTINUED | OUTPATIENT
Start: 2019-12-23 | End: 2019-12-26

## 2019-12-23 RX ORDER — SODIUM CHLORIDE 9 MG/ML
3 INJECTION INTRAMUSCULAR; INTRAVENOUS; SUBCUTANEOUS EVERY 6 HOURS
Refills: 0 | Status: DISCONTINUED | OUTPATIENT
Start: 2019-12-23 | End: 2019-12-26

## 2019-12-23 RX ORDER — WARFARIN SODIUM 2.5 MG/1
7.5 TABLET ORAL ONCE
Refills: 0 | Status: COMPLETED | OUTPATIENT
Start: 2019-12-23 | End: 2019-12-23

## 2019-12-23 RX ORDER — ENOXAPARIN SODIUM 100 MG/ML
30 INJECTION SUBCUTANEOUS ONCE
Refills: 0 | Status: COMPLETED | OUTPATIENT
Start: 2019-12-23 | End: 2019-12-23

## 2019-12-23 RX ADMIN — Medication 100 MILLIGRAM(S): at 15:40

## 2019-12-23 RX ADMIN — Medication 650 MILLIGRAM(S): at 16:12

## 2019-12-23 RX ADMIN — Medication 88 MICROGRAM(S): at 05:51

## 2019-12-23 RX ADMIN — Medication 240 MILLIGRAM(S): at 05:51

## 2019-12-23 RX ADMIN — Medication 650 MILLIGRAM(S): at 15:41

## 2019-12-23 RX ADMIN — WARFARIN SODIUM 7.5 MILLIGRAM(S): 2.5 TABLET ORAL at 21:02

## 2019-12-23 RX ADMIN — SENNA PLUS 2 TABLET(S): 8.6 TABLET ORAL at 21:00

## 2019-12-23 RX ADMIN — TRAMADOL HYDROCHLORIDE 25 MILLIGRAM(S): 50 TABLET ORAL at 21:00

## 2019-12-23 RX ADMIN — LIDOCAINE 1 PATCH: 4 CREAM TOPICAL at 01:00

## 2019-12-23 RX ADMIN — LIDOCAINE 1 PATCH: 4 CREAM TOPICAL at 20:52

## 2019-12-23 RX ADMIN — LIDOCAINE 1 PATCH: 4 CREAM TOPICAL at 12:43

## 2019-12-23 RX ADMIN — TRAMADOL HYDROCHLORIDE 25 MILLIGRAM(S): 50 TABLET ORAL at 21:30

## 2019-12-23 RX ADMIN — TRAMADOL HYDROCHLORIDE 25 MILLIGRAM(S): 50 TABLET ORAL at 08:30

## 2019-12-23 RX ADMIN — TRAMADOL HYDROCHLORIDE 25 MILLIGRAM(S): 50 TABLET ORAL at 07:57

## 2019-12-23 RX ADMIN — ENOXAPARIN SODIUM 30 MILLIGRAM(S): 100 INJECTION SUBCUTANEOUS at 12:43

## 2019-12-23 RX ADMIN — Medication 5 MILLIGRAM(S): at 21:00

## 2019-12-23 NOTE — PROGRESS NOTE ADULT - SUBJECTIVE AND OBJECTIVE BOX
CARDIOLOGY     PROGRESS  NOTE   ________________________________________________    CHIEF COMPLAINT:Patient is a 89y old  Female who presents with a chief complaint of chest pain/sob (22 Dec 2019 09:28)  still c/o l sided rib pain.  	  REVIEW OF SYSTEMS:  CONSTITUTIONAL: No fever, weight loss, or fatigue  EYES: No eye pain, visual disturbances, or discharge  ENT:  No difficulty hearing, tinnitus, vertigo; No sinus or throat pain  NECK: No pain or stiffness  RESPIRATORY: No cough, wheezing, chills or hemoptysis; decrease  Shortness of Breath  CARDIOVASCULAR: No chest pain, palpitations, passing out, dizziness, or leg swelling  GASTROINTESTINAL: No abdominal or epigastric pain. No nausea, vomiting, or hematemesis; No diarrhea or constipation. No melena or hematochezia.  GENITOURINARY: No dysuria, frequency, hematuria, or incontinence  NEUROLOGICAL: No headaches, memory loss, loss of strength, numbness, or tremors  SKIN: No itching, burning, rashes, or lesions   LYMPH Nodes: No enlarged glands  ENDOCRINE: No heat or cold intolerance; No hair loss  MUSCULOSKELETAL: No joint pain or swelling; No muscle, back, or extremity pain  PSYCHIATRIC: No depression, anxiety, mood swings, or difficulty sleeping  HEME/LYMPH: No easy bruising, or bleeding gums  ALLERGY AND IMMUNOLOGIC: No hives or eczema	    [ ] All others negative	  [ ] Unable to obtain    PHYSICAL EXAM:  T(C): 36.6 (12-23-19 @ 05:49), Max: 36.9 (12-23-19 @ 00:36)  HR: 60 (12-23-19 @ 05:49) (58 - 60)  BP: 113/72 (12-23-19 @ 05:49) (100/61 - 113/72)  RR: 18 (12-23-19 @ 05:49) (17 - 18)  SpO2: 95% (12-23-19 @ 05:49) (92% - 95%)  Wt(kg): --  I&O's Summary    22 Dec 2019 07:01  -  23 Dec 2019 07:00  --------------------------------------------------------  IN: 1120 mL / OUT: 225 mL / NET: 895 mL        Appearance: Normal	  HEENT:   Normal oral mucosa, PERRL, EOMI	  Lymphatic: No lymphadenopathy  Cardiovascular: Normal S1 S2, No JVD, + murmurs, No edema  Respiratory:rhonchi  Psychiatry: A & O x 3, Mood & affect appropriate  Gastrointestinal:  Soft, Non-tender, + BS	  Skin: No rashes, No ecchymoses, No cyanosis	  Neurologic: Non-focal  Extremities: Normal range of motion, No clubbing, cyanosis or edema  Vascular: Peripheral pulses palpable 2+ bilaterally    MEDICATIONS  (STANDING):  bisacodyl 5 milliGRAM(s) Oral at bedtime  hydrALAZINE 20 milliGRAM(s) Oral three times a day  levothyroxine 88 MICROGram(s) Oral daily  lidocaine   Patch 1 Patch Transdermal daily  senna 2 Tablet(s) Oral at bedtime  verapamil  milliGRAM(s) Oral daily      TELEMETRY: 	    ECG:  	  RADIOLOGY:  OTHER: 	  	  LABS:	 	    CARDIAC MARKERS:                                11.0   10.67 )-----------( 226      ( 21 Dec 2019 10:00 )             34.2     12-22    134<L>  |  97  |  57<H>  ----------------------------<  75  4.9   |  24  |  2.09<H>    Ca    9.4      22 Dec 2019 06:33      proBNP: Serum Pro-Brain Natriuretic Peptide: 5543 pg/mL (12-19 @ 13:19)    Lipid Profile:   HgA1c:   TSH: Thyroid Stimulating Hormone, Serum: 6.79 uIU/mL (12-20 @ 09:12)    PT/INR - ( 22 Dec 2019 08:57 )   PT: 12.7 sec;   INR: 1.10 ratio               Assessment and plan  ---------------------------  chf/pulmonary edema ? HFPEF  awaiting echo if decrease bp will switch verapamil to coreg  continue cardiac meds   lasix is held sec to increase renal function  ac for ami ,needs to check ppm  physical therapy  aspiration percussion  fu INR is 1.1 start ac till therputic  increase renal function , check bladder scan ? sec to iv contrast  decrease Lasix to daily, will may need to hold losartan sec to worsening renal function  check chest x ray CARDIOLOGY     PROGRESS  NOTE   ________________________________________________    CHIEF COMPLAINT:Patient is a 89y old  Female who presents with a chief complaint of chest pain/sob (22 Dec 2019 09:28)  still c/o l sided rib pain.  	  REVIEW OF SYSTEMS:  CONSTITUTIONAL: No fever, weight loss, or fatigue  EYES: No eye pain, visual disturbances, or discharge  ENT:  No difficulty hearing, tinnitus, vertigo; No sinus or throat pain  NECK: No pain or stiffness  RESPIRATORY: No cough, wheezing, chills or hemoptysis; decrease  Shortness of Breath  CARDIOVASCULAR: No chest pain, palpitations, passing out, dizziness, or leg swelling  GASTROINTESTINAL: No abdominal or epigastric pain. No nausea, vomiting, or hematemesis; No diarrhea or constipation. No melena or hematochezia.  GENITOURINARY: No dysuria, frequency, hematuria, or incontinence  NEUROLOGICAL: No headaches, memory loss, loss of strength, numbness, or tremors  SKIN: No itching, burning, rashes, or lesions   LYMPH Nodes: No enlarged glands  ENDOCRINE: No heat or cold intolerance; No hair loss  MUSCULOSKELETAL: No joint pain or swelling; No muscle, back, or extremity pain  PSYCHIATRIC: No depression, anxiety, mood swings, or difficulty sleeping  HEME/LYMPH: No easy bruising, or bleeding gums  ALLERGY AND IMMUNOLOGIC: No hives or eczema	    [ ] All others negative	  [ ] Unable to obtain    PHYSICAL EXAM:  T(C): 36.6 (12-23-19 @ 05:49), Max: 36.9 (12-23-19 @ 00:36)  HR: 60 (12-23-19 @ 05:49) (58 - 60)  BP: 113/72 (12-23-19 @ 05:49) (100/61 - 113/72)  RR: 18 (12-23-19 @ 05:49) (17 - 18)  SpO2: 95% (12-23-19 @ 05:49) (92% - 95%)  Wt(kg): --  I&O's Summary    22 Dec 2019 07:01  -  23 Dec 2019 07:00  --------------------------------------------------------  IN: 1120 mL / OUT: 225 mL / NET: 895 mL        Appearance: Normal	  HEENT:   Normal oral mucosa, PERRL, EOMI	  Lymphatic: No lymphadenopathy  Cardiovascular: Normal S1 S2, No JVD, + murmurs, No edema  Respiratory:rhonchi  Psychiatry: A & O x 3, Mood & affect appropriate  Gastrointestinal:  Soft, Non-tender, + BS	  Skin: No rashes, No ecchymoses, No cyanosis	  Neurologic: Non-focal  Extremities: Normal range of motion, No clubbing, cyanosis or edema  Vascular: Peripheral pulses palpable 2+ bilaterally    MEDICATIONS  (STANDING):  bisacodyl 5 milliGRAM(s) Oral at bedtime  hydrALAZINE 20 milliGRAM(s) Oral three times a day  levothyroxine 88 MICROGram(s) Oral daily  lidocaine   Patch 1 Patch Transdermal daily  senna 2 Tablet(s) Oral at bedtime  verapamil  milliGRAM(s) Oral daily      TELEMETRY: 	    ECG:  	  RADIOLOGY:  OTHER: 	  	  LABS:	 	    CARDIAC MARKERS:                                11.0   10.67 )-----------( 226      ( 21 Dec 2019 10:00 )             34.2     12-22    134<L>  |  97  |  57<H>  ----------------------------<  75  4.9   |  24  |  2.09<H>    Ca    9.4      22 Dec 2019 06:33      proBNP: Serum Pro-Brain Natriuretic Peptide: 5543 pg/mL (12-19 @ 13:19)    Lipid Profile:   HgA1c:   TSH: Thyroid Stimulating Hormone, Serum: 6.79 uIU/mL (12-20 @ 09:12)    PT/INR - ( 22 Dec 2019 08:57 )   PT: 12.7 sec;   INR: 1.10 ratio               Assessment and plan  ---------------------------  chf/pulmonary edema ? HFPEF  awaiting echo if decrease bp will switch verapamil to coreg  continue cardiac meds   lasix is held sec to increase renal function  ac for a.fib start iv heparin if inr is not therapeutic today  needs to check ppm  physical therapy  aspiration percussion  increase renal function , check bladder scan ? sec to iv contrast  decrease Lasix to daily, will may need to hold losartan sec to worsening renal function  check chest x ray

## 2019-12-23 NOTE — CONSULT NOTE ADULT - ASSESSMENT
89y Female with history of DVT on AC, afib s/p PPM presents with SOB. Nephrology consulted for elevated Scr.    1) RAAD: Secondary to contrast nephropathy. Patient educated on need for supportive care at this time. Check UA, urine sodium and urine creatinine. Bladder scan negative. Renal US pending. Avoid nephrotoxins.    2) CKD-3: Baseline Scr 1.0 likely age appropriate. Defer CKD work up given advanced age. Monitor electrolytes.    3) HTN with CKD: BP low normal. Continue holding losartan given RAAD. Avoid hypotension which will hinder renal recovery. Monitor BP.    4) LE edema: Holding lasix given improvement in CXR this morning. Will resume once renal function improves. In interim, can given prn doses for respiratory distress. F/U TTE. Monitor UO.

## 2019-12-23 NOTE — PROGRESS NOTE ADULT - ASSESSMENT
89 yo F      with   h/o      DVT.  A FIB on Coumadin), SSX,  s/p   PPM .    HTN, hypothyroid      , p/w chest pain ans  increasing   worsening x 1wk.  with incessant  cough  for past  week        admitted  with cp. from mlple rib fx's/  no h/o  trauma/ falls  suspect  fx;s are  from persistent cough/   osteoporotic bones, given age  of 89     CHF,   systolic,  acute on chronic/ not on Lasix ,  at home    lasix 20 mg iv          AFIB, on Coumadin   daily inr/  ,SSX,  s/p ppm,  on   verapamil/ losartan     echo /  ppm  interrogation      ct   head,  old  meningoma    ct    abd/ pelvis,   acute Left  7th to  10 tyh rib fx's/   T6  com fx. old/     trauma  eval noted. no intervention  pain meds/    lidocaine patch/   laxatives/  incentive spirometer/   PT  eval  on dig/ lasix/ cozaar/ verapamil/ synthroid at home  AFib.  coagulopathy  on arrival/ inr   still at 5/ vitamin K  given   follow inr/  pending now   RAAD,  could  be from lasix,  stopped/  dig  stopped  creatinine of  2.2/  / rising creatinine/  no obstruction   renal u/s pending         per  daughter/ pts     is  in [facility] / cindy [facility]  PT eval     < from: CT Head No Cont (12.19.19 @ 21:11) >  IMPRESSION: No acute intracranial hemorrhage.  Unchanged appearing right occipital convexity calcified/ossified meningioma.  Similar-appearing microvascular disease.  < end of copied text >     < from: CT Angio Abdomen and Pelvis w/ IV Cont (12.19.19 @ 17:30) >  BONES: Acute left seventh through 10th rib fractures. Age-indeterminate T6 compression fracture. Spinal degenerative changes. Mild retrolisthesis of L1 on L2 and mild anterolisthesis of L4 and L5.  IMPRESSION: Evidence of mild peripheral interstitial pulmonary edema.  Acute left seventh through 10th rib fractures.  OLGA SADLER M.D., RADIOLOGY RESIDENT

## 2019-12-23 NOTE — CONSULT NOTE ADULT - ATTENDING COMMENTS
[facility] NEPHROLOGY  Zheng Daniels M.D.  Beka Archuleta D.O.  Radha Pimentel M.D.  Corinne Hong, MSN, ANP-C    Telephone: [phone]  Facsimile: [phone]    [address] [place] [address]
Pt seen and examined. Chart reviewed. Resident note confirmed. Pt is a 89 year old female with a medical history signficant for CAD/HTN/AFib (on coumadin/CHF who presents to [facility] with one week of chest pain. Pt denies trauma. INR 5.     A/p    Neuro:	Rib pain  	Start multimodal pain control  	CT head for elevated INR    CVS:	CAD/HTN/AF  	Continue b-blocker with hold parameters  	Continue cardiac monitoring    Pulm:	Atelectasis  	ISP    GI:	No active issues  	Reg diet    :	CKD 3  	monitor I's and O's  	  Heme:	No active issues    ID:	Culture for fever

## 2019-12-23 NOTE — PROGRESS NOTE ADULT - SUBJECTIVE AND OBJECTIVE BOX
no complaints  REVIEW OF SYSTEMS:  GEN: no fever,    no chills  RESP: no SOB,   no cough  CVS: no chest pain,   no palpitations  GI: no abdominal pain,   no nausea,   no vomiting,   no constipation,   no diarrhea  : no dysuria,   no frequency  NEURO: no headache,   no dizziness  PSYCH: no depression,   not anxious  Derm : no rash    MEDICATIONS  (STANDING):  bisacodyl 5 milliGRAM(s) Oral at bedtime  hydrALAZINE 20 milliGRAM(s) Oral three times a day  levothyroxine 88 MICROGram(s) Oral daily  lidocaine   Patch 1 Patch Transdermal daily  senna 2 Tablet(s) Oral at bedtime  verapamil  milliGRAM(s) Oral daily    MEDICATIONS  (PRN):  acetaminophen   Tablet .. 650 milliGRAM(s) Oral every 6 hours PRN Mild Pain (1 - 3), Moderate Pain (4 - 6)  traMADol 25 milliGRAM(s) Oral two times a day PRN Severe Pain (7 - 10)      Vital Signs Last 24 Hrs  T(C): 36.6 (23 Dec 2019 05:49), Max: 36.9 (23 Dec 2019 00:36)  T(F): 97.8 (23 Dec 2019 05:49), Max: 98.4 (23 Dec 2019 00:36)  HR: 60 (23 Dec 2019 05:49) (58 - 60)  BP: 113/72 (23 Dec 2019 05:49) (100/61 - 113/72)  BP(mean): --  RR: 18 (23 Dec 2019 05:49) (17 - 18)  SpO2: 95% (23 Dec 2019 05:49) (92% - 95%)  CAPILLARY BLOOD GLUCOSE        I&O's Summary    22 Dec 2019 07:01  -  23 Dec 2019 07:00  --------------------------------------------------------  IN: 1120 mL / OUT: 225 mL / NET: 895 mL        PHYSICAL EXAM:  HEAD:  Atraumatic, Normocephalic  NECK: Supple, No   JVD  CHEST/LUNG:   no     rales,     no,    rhonchi  HEART: Regular rate and rhythm;         murmur  ABDOMEN: Soft, Nontender, ;   EXTREMITIES:    no    edema  NEUROLOGY:  alert    LABS:                        11.0   10.67 )-----------( 226      ( 21 Dec 2019 10:00 )             34.2     12-23    132<L>  |  98  |  62<H>  ----------------------------<  78  4.8   |  23  |  2.25<H>    Ca    9.3      23 Dec 2019 07:01      PT/INR - ( 22 Dec 2019 08:57 )   PT: 12.7 sec;   INR: 1.10 ratio                         Thyroid Stimulating Hormone, Serum: 6.79 uIU/mL (12-20 @ 09:12)          Consultant(s) Notes Reviewed:      Care Discussed with Consultants/Other Providers:

## 2019-12-23 NOTE — CONSULT NOTE ADULT - SUBJECTIVE AND OBJECTIVE BOX
[facility] NEPHROLOGY- CONSULTATION NOTE    89y Female with history of below presents with SOB. Nephrology consulted for elevated Scr.    Patient with relatively age appropriate Scr on admission with acute rise on 12/21 with has continued until this morning. Patient had been on lasix 20 mg IV twice daily and losartan 100 mg daily which had been subsequently discontinued. Patient underwent CT-A with contrast on 12/19. Patient with no overt hypotension around this time.    REVIEW OF SYSTEMS:  Gen: no changes in weight  HEENT: no rhinorrhea  Neck: no sore throat  Cards: no chest pain  Resp: + dyspnea, + phlegm  GI: no nausea or vomiting or diarrhea  : no dysuria or hematuria  Vascular: no LE edema  Derm: no rashes  Neuro: no numbness/tingling    No Known Allergies      Home Medications Reviewed  Hospital Medications:   MEDICATIONS  (STANDING):  bisacodyl 5 milliGRAM(s) Oral at bedtime  hydrALAZINE 20 milliGRAM(s) Oral three times a day  levothyroxine 88 MICROGram(s) Oral daily  lidocaine   Patch 1 Patch Transdermal daily  senna 2 Tablet(s) Oral at bedtime  verapamil  milliGRAM(s) Oral daily  warfarin 7.5 milliGRAM(s) Oral once      PAST MEDICAL & SURGICAL HISTORY:  Atrial fibrillation  Hypothyroid  HTN (hypertension)  DVT (deep venous thrombosis)  Cardiac pacemaker      FAMILY HISTORY:  N/C    SOCIAL HISTORY:  Denies toxic substance use     VITALS:  T(F): 97.3 (12-23-19 @ 11:14), Max: 98.4 (12-23-19 @ 00:36)  HR: 60 (12-23-19 @ 11:14)  BP: 98/61 (12-23-19 @ 11:14)  RR: 18 (12-23-19 @ 11:14)  SpO2: 96% (12-23-19 @ 11:14)  Wt(kg): --    12-22 @ 07:01  -  12-23 @ 07:00  --------------------------------------------------------  IN: 1120 mL / OUT: 225 mL / NET: 895 mL    12-23 @ 07:01  -  12-23 @ 14:06  --------------------------------------------------------  IN: 480 mL / OUT: 0 mL / NET: 480 mL        PHYSICAL EXAM:  Gen: NAD, calm  HEENT: MMM  Neck: no JVD  Cards: RRR, +S1/S2, no M/G/R  Resp: Decreased BS @ bases  GI: soft, NT/ND, NABS  : no CVA tenderness  Vascular: 2+ LE edema B/L with erythema  Derm: no rashes  Neuro: non-focal      LABS:  12-23    132<L>  |  98  |  62<H>  ----------------------------<  78  4.8   |  23  |  2.25<H>    Ca    9.3      23 Dec 2019 07:01      Creatinine Trend: 2.25 <--, 2.09 <--, 1.66 <--, 1.08 <--, 1.00 <--, 1.02 <--    Urine Studies:  Urinalysis Basic - ( 19 Dec 2019 21:41 )    Color: Yellow / Appearance: Clear / SG: >1.050 / pH:   Gluc:  / Ketone: Negative  / Bili: Negative / Urobili: Negative   Blood:  / Protein: 100 / Nitrite: Negative   Leuk Esterase: Small / RBC: 3 /hpf / WBC 24 /HPF   Sq Epi:  / Non Sq Epi: 1 /hpf / Bacteria: Many          RADIOLOGY & ADDITIONAL STUDIES:  < from: Xray Chest 1 View- PORTABLE-Urgent (12.23.19 @ 08:57) >    IMPRESSION:    Trace bilateral pleural effusions. Otherwise, the lungs are clear. No significant pulmonary edema.    < end of copied text >        < from: CT Head No Cont (12.19.19 @ 21:11) >  IMPRESSION: No acute intracranial hemorrhage.    Unchanged appearing right occipital convexity calcified/ossified meningioma.    Similar-appearing microvascular disease.    < end of copied text >        < from: CT Angio Abdomen and Pelvis w/ IV Cont (12.19.19 @ 17:30) >  IMPRESSION: Evidence of mild peripheral interstitial pulmonary edema.    Acute left seventh through 10th rib fractures.    < end of copied text >      < from: CT Angio Abdomen and Pelvis w/ IV Cont (12.19.19 @ 17:30) >  KIDNEYS/URETERS: Multiple hypodensities within the kidneys some of which represent cysts. Some cannot be characterized    BLADDER: Within normal limits.    < end of copied text >

## 2019-12-24 LAB
ANION GAP SERPL CALC-SCNC: 11 MMOL/L — SIGNIFICANT CHANGE UP (ref 5–17)
ANION GAP SERPL CALC-SCNC: 13 MMOL/L — SIGNIFICANT CHANGE UP (ref 5–17)
APPEARANCE UR: CLEAR — SIGNIFICANT CHANGE UP
BACTERIA # UR AUTO: ABNORMAL
BASOPHILS # BLD AUTO: 0.03 K/UL — SIGNIFICANT CHANGE UP (ref 0–0.2)
BASOPHILS NFR BLD AUTO: 0.4 % — SIGNIFICANT CHANGE UP (ref 0–2)
BILIRUB UR-MCNC: NEGATIVE — SIGNIFICANT CHANGE UP
BUN SERPL-MCNC: 56 MG/DL — HIGH (ref 7–23)
BUN SERPL-MCNC: 65 MG/DL — HIGH (ref 7–23)
CALCIUM SERPL-MCNC: 9.1 MG/DL — SIGNIFICANT CHANGE UP (ref 8.4–10.5)
CALCIUM SERPL-MCNC: 9.8 MG/DL — SIGNIFICANT CHANGE UP (ref 8.4–10.5)
CHLORIDE SERPL-SCNC: 97 MMOL/L — SIGNIFICANT CHANGE UP (ref 96–108)
CHLORIDE SERPL-SCNC: 97 MMOL/L — SIGNIFICANT CHANGE UP (ref 96–108)
CO2 SERPL-SCNC: 22 MMOL/L — SIGNIFICANT CHANGE UP (ref 22–31)
CO2 SERPL-SCNC: 25 MMOL/L — SIGNIFICANT CHANGE UP (ref 22–31)
COLOR SPEC: SIGNIFICANT CHANGE UP
CREAT ?TM UR-MCNC: 37 MG/DL — SIGNIFICANT CHANGE UP
CREAT SERPL-MCNC: 1.98 MG/DL — HIGH (ref 0.5–1.3)
CREAT SERPL-MCNC: 2.02 MG/DL — HIGH (ref 0.5–1.3)
DIFF PNL FLD: NEGATIVE — SIGNIFICANT CHANGE UP
EOSINOPHIL # BLD AUTO: 0.18 K/UL — SIGNIFICANT CHANGE UP (ref 0–0.5)
EOSINOPHIL NFR BLD AUTO: 2.4 % — SIGNIFICANT CHANGE UP (ref 0–6)
EPI CELLS # UR: 0 /HPF — SIGNIFICANT CHANGE UP (ref 0–5)
GLUCOSE SERPL-MCNC: 108 MG/DL — HIGH (ref 70–99)
GLUCOSE SERPL-MCNC: 76 MG/DL — SIGNIFICANT CHANGE UP (ref 70–99)
GLUCOSE UR QL: NEGATIVE — SIGNIFICANT CHANGE UP
HCT VFR BLD CALC: 33.8 % — LOW (ref 34.5–45)
HGB BLD-MCNC: 10.9 G/DL — LOW (ref 11.5–15.5)
HYALINE CASTS # UR AUTO: 0 /LPF — SIGNIFICANT CHANGE UP (ref 0–7)
IMM GRANULOCYTES NFR BLD AUTO: 1.1 % — SIGNIFICANT CHANGE UP (ref 0–1.5)
INR BLD: 1.7 RATIO — HIGH (ref 0.88–1.16)
KETONES UR-MCNC: NEGATIVE — SIGNIFICANT CHANGE UP
LEUKOCYTE ESTERASE UR-ACNC: NEGATIVE — SIGNIFICANT CHANGE UP
LYMPHOCYTES # BLD AUTO: 1.06 K/UL — SIGNIFICANT CHANGE UP (ref 1–3.3)
LYMPHOCYTES # BLD AUTO: 14.1 % — SIGNIFICANT CHANGE UP (ref 13–44)
MCHC RBC-ENTMCNC: 22.9 PG — LOW (ref 27–34)
MCHC RBC-ENTMCNC: 32.2 GM/DL — SIGNIFICANT CHANGE UP (ref 32–36)
MCV RBC AUTO: 71.2 FL — LOW (ref 80–100)
MONOCYTES # BLD AUTO: 1.04 K/UL — HIGH (ref 0–0.9)
MONOCYTES NFR BLD AUTO: 13.8 % — SIGNIFICANT CHANGE UP (ref 2–14)
NEUTROPHILS # BLD AUTO: 5.13 K/UL — SIGNIFICANT CHANGE UP (ref 1.8–7.4)
NEUTROPHILS NFR BLD AUTO: 68.2 % — SIGNIFICANT CHANGE UP (ref 43–77)
NITRITE UR-MCNC: POSITIVE
PH UR: 6 — SIGNIFICANT CHANGE UP (ref 5–8)
PLATELET # BLD AUTO: 247 K/UL — SIGNIFICANT CHANGE UP (ref 150–400)
POTASSIUM SERPL-MCNC: 4.8 MMOL/L — SIGNIFICANT CHANGE UP (ref 3.5–5.3)
POTASSIUM SERPL-MCNC: 4.8 MMOL/L — SIGNIFICANT CHANGE UP (ref 3.5–5.3)
POTASSIUM SERPL-SCNC: 4.8 MMOL/L — SIGNIFICANT CHANGE UP (ref 3.5–5.3)
POTASSIUM SERPL-SCNC: 4.8 MMOL/L — SIGNIFICANT CHANGE UP (ref 3.5–5.3)
PROT UR-MCNC: NEGATIVE — SIGNIFICANT CHANGE UP
PROTHROM AB SERPL-ACNC: 19.9 SEC — HIGH (ref 10–13.1)
RBC # BLD: 4.75 M/UL — SIGNIFICANT CHANGE UP (ref 3.8–5.2)
RBC # FLD: 15.5 % — HIGH (ref 10.3–14.5)
RBC CASTS # UR COMP ASSIST: 1 /HPF — SIGNIFICANT CHANGE UP (ref 0–4)
SODIUM SERPL-SCNC: 130 MMOL/L — LOW (ref 135–145)
SODIUM SERPL-SCNC: 135 MMOL/L — SIGNIFICANT CHANGE UP (ref 135–145)
SODIUM UR-SCNC: <35 MMOL/L — SIGNIFICANT CHANGE UP
SP GR SPEC: 1.01 — SIGNIFICANT CHANGE UP (ref 1.01–1.02)
UROBILINOGEN FLD QL: SIGNIFICANT CHANGE UP
WBC # BLD: 7.52 K/UL — SIGNIFICANT CHANGE UP (ref 3.8–10.5)
WBC # FLD AUTO: 7.52 K/UL — SIGNIFICANT CHANGE UP (ref 3.8–10.5)
WBC UR QL: 4 /HPF — SIGNIFICANT CHANGE UP (ref 0–5)

## 2019-12-24 PROCEDURE — 93306 TTE W/DOPPLER COMPLETE: CPT | Mod: 26

## 2019-12-24 PROCEDURE — 76770 US EXAM ABDO BACK WALL COMP: CPT | Mod: 26

## 2019-12-24 RX ORDER — ENOXAPARIN SODIUM 100 MG/ML
30 INJECTION SUBCUTANEOUS DAILY
Refills: 0 | Status: DISCONTINUED | OUTPATIENT
Start: 2019-12-24 | End: 2019-12-25

## 2019-12-24 RX ORDER — WARFARIN SODIUM 2.5 MG/1
5 TABLET ORAL ONCE
Refills: 0 | Status: COMPLETED | OUTPATIENT
Start: 2019-12-24 | End: 2019-12-24

## 2019-12-24 RX ADMIN — LIDOCAINE 1 PATCH: 4 CREAM TOPICAL at 17:26

## 2019-12-24 RX ADMIN — TRAMADOL HYDROCHLORIDE 25 MILLIGRAM(S): 50 TABLET ORAL at 19:47

## 2019-12-24 RX ADMIN — ENOXAPARIN SODIUM 30 MILLIGRAM(S): 100 INJECTION SUBCUTANEOUS at 13:37

## 2019-12-24 RX ADMIN — WARFARIN SODIUM 5 MILLIGRAM(S): 2.5 TABLET ORAL at 21:39

## 2019-12-24 RX ADMIN — SENNA PLUS 2 TABLET(S): 8.6 TABLET ORAL at 21:39

## 2019-12-24 RX ADMIN — Medication 5 MILLIGRAM(S): at 21:39

## 2019-12-24 RX ADMIN — Medication 650 MILLIGRAM(S): at 12:32

## 2019-12-24 RX ADMIN — Medication 1 APPLICATION(S): at 12:01

## 2019-12-24 RX ADMIN — LIDOCAINE 1 PATCH: 4 CREAM TOPICAL at 12:01

## 2019-12-24 RX ADMIN — Medication 20 MILLIGRAM(S): at 06:00

## 2019-12-24 RX ADMIN — Medication 240 MILLIGRAM(S): at 06:00

## 2019-12-24 RX ADMIN — LIDOCAINE 1 PATCH: 4 CREAM TOPICAL at 01:00

## 2019-12-24 RX ADMIN — Medication 650 MILLIGRAM(S): at 12:02

## 2019-12-24 RX ADMIN — TRAMADOL HYDROCHLORIDE 25 MILLIGRAM(S): 50 TABLET ORAL at 20:47

## 2019-12-24 RX ADMIN — Medication 88 MICROGRAM(S): at 06:01

## 2019-12-24 NOTE — PROGRESS NOTE ADULT - ASSESSMENT
89 yo F      with   h/o      DVT.  A FIB on Coumadin), SSX,  s/p   PPM .    HTN, hypothyroid      , p/w chest pain ans  increasing   worsening x 1wk.  with incessant  cough  for past  week        admitted  with cp. from mlple rib fx's/  no h/o  trauma/ falls  suspect  fx;s are  from persistent cough/   osteoporotic bones, given age  of 89     CHF,   systolic,  acute on chronic/ not on Lasix ,  at home    lasix 20 mg iv          AFIB, on Coumadin   daily inr/  ,SSX,  s/p ppm,  on   verapamil/ losartan     echo /  ppm  interrogation      ct   head,  old  meningoma    ct    abd/ pelvis,   acute Left  7th to  10 tyh rib fx's/   T6  com fx. old/     trauma  eval noted. no intervention  pain meds/    lidocaine patch/   laxatives/  incentive spirometer/   PT  eval  on dig/ lasix/ cozaar/ verapamil/ synthroid at home  AFib.  coagulopathy  on arrival/ inr   still at 5/ vitamin K  given   follow inr/  pending now   RAAD,  could  be from lasix,  stopped/  dig  stopped  creatinine of  2.2/  / rising creatinine/  no obstruction   renal u/s pending/  follow  creatinine.  seen by renal         per  daughter/ pts  dr little  in [facility] / cindy [facility]  PT eval     < from: CT Head No Cont (12.19.19 @ 21:11) >  IMPRESSION: No acute intracranial hemorrhage.  Unchanged appearing right occipital convexity calcified/ossified meningioma.  Similar-appearing microvascular disease.  < end of copied text >     < from: CT Angio Abdomen and Pelvis w/ IV Cont (12.19.19 @ 17:30) >  BONES: Acute left seventh through 10th rib fractures. Age-indeterminate T6 compression fracture. Spinal degenerative changes. Mild retrolisthesis of L1 on L2 and mild anterolisthesis of L4 and L5.  IMPRESSION: Evidence of mild peripheral interstitial pulmonary edema.  Acute left seventh through 10th rib fractures.  OLGA SADLER M.D., RADIOLOGY RESIDENT

## 2019-12-24 NOTE — PROGRESS NOTE ADULT - SUBJECTIVE AND OBJECTIVE BOX
no complaints  REVIEW OF SYSTEMS:  GEN: no fever,    no chills  RESP: no SOB,   no cough  CVS: no chest pain,   no palpitations  GI: no abdominal pain,   no nausea,   no vomiting,   no constipation,   no diarrhea  : no dysuria,   no frequency  NEURO: no headache,   no dizziness  PSYCH: no depression,   not anxious  Derm : no rash    MEDICATIONS  (STANDING):  BACItracin   Ointment 1 Application(s) Topical daily  bisacodyl 5 milliGRAM(s) Oral at bedtime  hydrALAZINE 20 milliGRAM(s) Oral three times a day  levothyroxine 88 MICROGram(s) Oral daily  lidocaine   Patch 1 Patch Transdermal daily  senna 2 Tablet(s) Oral at bedtime  verapamil  milliGRAM(s) Oral daily    MEDICATIONS  (PRN):  acetaminophen   Tablet .. 650 milliGRAM(s) Oral every 6 hours PRN Mild Pain (1 - 3), Moderate Pain (4 - 6)  guaiFENesin   Syrup  (Sugar-Free) 100 milliGRAM(s) Oral every 6 hours PRN Cough  sodium chloride 3%  Inhalation 3 milliLiter(s) Inhalation every 6 hours PRN cough/dry secretion  traMADol 25 milliGRAM(s) Oral two times a day PRN Severe Pain (7 - 10)      Vital Signs Last 24 Hrs  T(C): 36.2 (24 Dec 2019 05:59), Max: 37 (23 Dec 2019 14:40)  T(F): 97.2 (24 Dec 2019 05:59), Max: 98.6 (23 Dec 2019 14:40)  HR: 60 (24 Dec 2019 05:59) (60 - 69)  BP: 132/79 (24 Dec 2019 05:59) (98/61 - 132/79)  BP(mean): --  RR: 18 (24 Dec 2019 05:59) (18 - 18)  SpO2: 95% (24 Dec 2019 05:59) (95% - 97%)  CAPILLARY BLOOD GLUCOSE        I&O's Summary    23 Dec 2019 07:01  -  24 Dec 2019 07:00  --------------------------------------------------------  IN: 840 mL / OUT: 0 mL / NET: 840 mL        PHYSICAL EXAM:  HEAD:  Atraumatic, Normocephalic  NECK: Supple, No   JVD  CHEST/LUNG:   no     rales,     no,    rhonchi  HEART: Regular rate and rhythm;         murmur  ABDOMEN: Soft, Nontender, ;   EXTREMITIES:     no   edema  NEUROLOGY:  alert    LABS:                        10.9   7.52  )-----------( 247      ( 24 Dec 2019 07:48 )             33.8     12-24    130<L>  |  97  |  65<H>  ----------------------------<  76  4.8   |  22  |  2.02<H>    Ca    9.1      24 Dec 2019 06:01      PT/INR - ( 23 Dec 2019 08:18 )   PT: 14.3 sec;   INR: 1.26 ratio                         Thyroid Stimulating Hormone, Serum: 6.79 uIU/mL (12-20 @ 09:12)          Consultant(s) Notes Reviewed:      Care Discussed with Consultants/Other Providers:

## 2019-12-24 NOTE — PROGRESS NOTE ADULT - SUBJECTIVE AND OBJECTIVE BOX
CARDIOLOGY     PROGRESS  NOTE   ________________________________________________    CHIEF COMPLAINT:Patient is a 89y old  Female who presents with a chief complaint of chest pain/sob (23 Dec 2019 14:05)  no complain.  	  REVIEW OF SYSTEMS:  CONSTITUTIONAL: No fever, weight loss, or fatigue  EYES: No eye pain, visual disturbances, or discharge  ENT:  No difficulty hearing, tinnitus, vertigo; No sinus or throat pain  NECK: No pain or stiffness  RESPIRATORY: No cough, wheezing, chills or hemoptysis; decrease  Shortness of Breath  CARDIOVASCULAR: No chest pain, palpitations, passing out, dizziness, or leg swelling  GASTROINTESTINAL: No abdominal or epigastric pain. No nausea, vomiting, or hematemesis; No diarrhea or constipation. No melena or hematochezia.  GENITOURINARY: No dysuria, frequency, hematuria, or incontinence  NEUROLOGICAL: No headaches, memory loss, loss of strength, numbness, or tremors  SKIN: No itching, burning, rashes, or lesions   LYMPH Nodes: No enlarged glands  ENDOCRINE: No heat or cold intolerance; No hair loss  MUSCULOSKELETAL: No joint pain or swelling; No muscle, back, or extremity pain  PSYCHIATRIC: No depression, anxiety, mood swings, or difficulty sleeping  HEME/LYMPH: No easy bruising, or bleeding gums  ALLERGY AND IMMUNOLOGIC: No hives or eczema	    [ ] All others negative	  [ ] Unable to obtain    PHYSICAL EXAM:  T(C): 36.2 (12-24-19 @ 05:59), Max: 37 (12-23-19 @ 14:40)  HR: 60 (12-24-19 @ 05:59) (60 - 69)  BP: 132/79 (12-24-19 @ 05:59) (98/61 - 132/79)  RR: 18 (12-24-19 @ 05:59) (18 - 18)  SpO2: 95% (12-24-19 @ 05:59) (95% - 97%)  Wt(kg): --  I&O's Summary    23 Dec 2019 07:01  -  24 Dec 2019 07:00  --------------------------------------------------------  IN: 840 mL / OUT: 0 mL / NET: 840 mL        Appearance: Normal	  HEENT:   Normal oral mucosa, PERRL, EOMI	  Lymphatic: No lymphadenopathy  Cardiovascular: Normal S1 S2, No JVD, + murmurs, No edema  Respiratory: rhonchi  Psychiatry: A & O x 3, Mood & affect appropriate  Gastrointestinal:  Soft, Non-tender, + BS	  Skin: No rashes, No ecchymoses, No cyanosis	  Neurologic: Non-focal  Extremities: Normal range of motion, No clubbing, cyanosis or edema  Vascular: Peripheral pulses palpable 2+ bilaterally    MEDICATIONS  (STANDING):  BACItracin   Ointment 1 Application(s) Topical daily  bisacodyl 5 milliGRAM(s) Oral at bedtime  hydrALAZINE 20 milliGRAM(s) Oral three times a day  levothyroxine 88 MICROGram(s) Oral daily  lidocaine   Patch 1 Patch Transdermal daily  senna 2 Tablet(s) Oral at bedtime  verapamil  milliGRAM(s) Oral daily      TELEMETRY: 	    ECG:  	  RADIOLOGY:  OTHER: 	  	  LABS:	 	    CARDIAC MARKERS:                                10.9   7.52  )-----------( 247      ( 24 Dec 2019 07:48 )             33.8     12-24    130<L>  |  97  |  65<H>  ----------------------------<  76  4.8   |  22  |  2.02<H>    Ca    9.1      24 Dec 2019 06:01      proBNP: Serum Pro-Brain Natriuretic Peptide: 5543 pg/mL (12-19 @ 13:19)    Lipid Profile:   HgA1c:   TSH: Thyroid Stimulating Hormone, Serum: 6.79 uIU/mL (12-20 @ 09:12)    PT/INR - ( 23 Dec 2019 08:18 )   PT: 14.3 sec;   INR: 1.26 ratio       < from: Xray Chest 1 View- PORTABLE-Urgent (12.23.19 @ 08:57) >  INTERPRETATION:  CLINICAL INFORMATION: Congestive heart failure.    TECHNIQUE: AP view of the chest.    COMPARISON: Chest radiograph 12/19/2019.    FINDINGS:    Left chest wall dual-chamber pacemaker.    Trace bilateral pleural effusions are unchanged. Otherwise, the lungs are clear.    Cardiomegaly.    Degenerative changes of the spine. Osteoarthritis of the shoulders bilaterally.    IMPRESSION:    Trace bilateral pleural effusions. Otherwise, the lungs are clear. No significant pulmonary edema.    < end of copied text >          Assessment and plan  ---------------------------  chf/pulmonary edema ? HFPEF  awaiting echo if decrease bp will switch verapamil to coreg  continue cardiac meds   lasix is held sec to increase renal function  ac for a.fib start iv heparin if inr is not therapeutic today  needs to check ppm  physical therapy  aspiration percussion  increase renal function , check bladder scan ? sec to iv contrast  decrease Lasix to daily, will may need to hold losartan sec to worsening renal function  chest x ray noted   ckd hold lasix

## 2019-12-25 LAB
ANION GAP SERPL CALC-SCNC: 10 MMOL/L — SIGNIFICANT CHANGE UP (ref 5–17)
BUN SERPL-MCNC: 58 MG/DL — HIGH (ref 7–23)
CALCIUM SERPL-MCNC: 9.3 MG/DL — SIGNIFICANT CHANGE UP (ref 8.4–10.5)
CHLORIDE SERPL-SCNC: 101 MMOL/L — SIGNIFICANT CHANGE UP (ref 96–108)
CO2 SERPL-SCNC: 25 MMOL/L — SIGNIFICANT CHANGE UP (ref 22–31)
CREAT SERPL-MCNC: 1.52 MG/DL — HIGH (ref 0.5–1.3)
GLUCOSE SERPL-MCNC: 71 MG/DL — SIGNIFICANT CHANGE UP (ref 70–99)
INR BLD: 2.84 RATIO — HIGH (ref 0.88–1.16)
POTASSIUM SERPL-MCNC: 4.6 MMOL/L — SIGNIFICANT CHANGE UP (ref 3.5–5.3)
POTASSIUM SERPL-SCNC: 4.6 MMOL/L — SIGNIFICANT CHANGE UP (ref 3.5–5.3)
PROTHROM AB SERPL-ACNC: 33.4 SEC — HIGH (ref 10–13.1)
SODIUM SERPL-SCNC: 136 MMOL/L — SIGNIFICANT CHANGE UP (ref 135–145)

## 2019-12-25 RX ORDER — DILTIAZEM HCL 120 MG
240 CAPSULE, EXT RELEASE 24 HR ORAL DAILY
Refills: 0 | Status: DISCONTINUED | OUTPATIENT
Start: 2019-12-25 | End: 2019-12-26

## 2019-12-25 RX ORDER — WARFARIN SODIUM 2.5 MG/1
2 TABLET ORAL ONCE
Refills: 0 | Status: COMPLETED | OUTPATIENT
Start: 2019-12-25 | End: 2019-12-25

## 2019-12-25 RX ORDER — BUMETANIDE 0.25 MG/ML
1 INJECTION INTRAMUSCULAR; INTRAVENOUS DAILY
Refills: 0 | Status: DISCONTINUED | OUTPATIENT
Start: 2019-12-25 | End: 2019-12-26

## 2019-12-25 RX ADMIN — LIDOCAINE 1 PATCH: 4 CREAM TOPICAL at 00:30

## 2019-12-25 RX ADMIN — LIDOCAINE 1 PATCH: 4 CREAM TOPICAL at 19:00

## 2019-12-25 RX ADMIN — Medication 240 MILLIGRAM(S): at 13:27

## 2019-12-25 RX ADMIN — TRAMADOL HYDROCHLORIDE 25 MILLIGRAM(S): 50 TABLET ORAL at 23:00

## 2019-12-25 RX ADMIN — WARFARIN SODIUM 2 MILLIGRAM(S): 2.5 TABLET ORAL at 21:51

## 2019-12-25 RX ADMIN — Medication 1 APPLICATION(S): at 13:27

## 2019-12-25 RX ADMIN — TRAMADOL HYDROCHLORIDE 25 MILLIGRAM(S): 50 TABLET ORAL at 09:00

## 2019-12-25 RX ADMIN — SENNA PLUS 2 TABLET(S): 8.6 TABLET ORAL at 21:51

## 2019-12-25 RX ADMIN — TRAMADOL HYDROCHLORIDE 25 MILLIGRAM(S): 50 TABLET ORAL at 21:56

## 2019-12-25 RX ADMIN — BUMETANIDE 1 MILLIGRAM(S): 0.25 INJECTION INTRAMUSCULAR; INTRAVENOUS at 13:27

## 2019-12-25 RX ADMIN — Medication 240 MILLIGRAM(S): at 05:58

## 2019-12-25 RX ADMIN — Medication 5 MILLIGRAM(S): at 21:51

## 2019-12-25 RX ADMIN — LIDOCAINE 1 PATCH: 4 CREAM TOPICAL at 13:27

## 2019-12-25 RX ADMIN — Medication 20 MILLIGRAM(S): at 05:58

## 2019-12-25 RX ADMIN — Medication 650 MILLIGRAM(S): at 11:40

## 2019-12-25 RX ADMIN — Medication 650 MILLIGRAM(S): at 11:03

## 2019-12-25 RX ADMIN — TRAMADOL HYDROCHLORIDE 25 MILLIGRAM(S): 50 TABLET ORAL at 08:30

## 2019-12-25 RX ADMIN — Medication 88 MICROGRAM(S): at 05:58

## 2019-12-25 NOTE — PROGRESS NOTE ADULT - SUBJECTIVE AND OBJECTIVE BOX
no compalints    REVIEW OF SYSTEMS:  GEN: no fever,    no chills  RESP: no SOB,   no cough  CVS: no chest pain,   no palpitations  GI: no abdominal pain,   no nausea,   no vomiting,   no constipation,   no diarrhea  : no dysuria,   no frequency  NEURO: no headache,   no dizziness  PSYCH: no depression,   not anxious  Derm : no rash    MEDICATIONS  (STANDING):  BACItracin   Ointment 1 Application(s) Topical daily  bisacodyl 5 milliGRAM(s) Oral at bedtime  buMETAnide 1 milliGRAM(s) Oral daily  diltiazem    milliGRAM(s) Oral daily  enoxaparin Injectable 30 milliGRAM(s) SubCutaneous daily  hydrALAZINE 20 milliGRAM(s) Oral three times a day  levothyroxine 88 MICROGram(s) Oral daily  lidocaine   Patch 1 Patch Transdermal daily  senna 2 Tablet(s) Oral at bedtime    MEDICATIONS  (PRN):  acetaminophen   Tablet .. 650 milliGRAM(s) Oral every 6 hours PRN Mild Pain (1 - 3), Moderate Pain (4 - 6)  guaiFENesin   Syrup  (Sugar-Free) 100 milliGRAM(s) Oral every 6 hours PRN Cough  sodium chloride 3%  Inhalation 3 milliLiter(s) Inhalation every 6 hours PRN cough/dry secretion  traMADol 25 milliGRAM(s) Oral two times a day PRN Severe Pain (7 - 10)      Vital Signs Last 24 Hrs  T(C): 36.3 (25 Dec 2019 09:09), Max: 36.8 (25 Dec 2019 02:15)  T(F): 97.3 (25 Dec 2019 09:09), Max: 98.2 (25 Dec 2019 02:15)  HR: 62 (25 Dec 2019 09:09) (60 - 92)  BP: 109/67 (25 Dec 2019 09:09) (97/59 - 133/77)  BP(mean): --  RR: 18 (25 Dec 2019 09:09) (17 - 18)  SpO2: 97% (25 Dec 2019 09:09) (93% - 97%)  CAPILLARY BLOOD GLUCOSE        I&O's Summary    24 Dec 2019 07:01  -  25 Dec 2019 07:00  --------------------------------------------------------  IN: 1250 mL / OUT: 0 mL / NET: 1250 mL    25 Dec 2019 07:01  -  25 Dec 2019 09:30  --------------------------------------------------------  IN: 120 mL / OUT: 0 mL / NET: 120 mL        PHYSICAL EXAM:  HEAD:  Atraumatic, Normocephalic  NECK: Supple, No   JVD  CHEST/LUNG:   no     rales,     no,    rhonchi  HEART: Regular rate and rhythm;         murmur  ABDOMEN: Soft, Nontender, ;   EXTREMITIES:     no   edema  NEUROLOGY:  alert    LABS:                        10.9   7.52  )-----------( 247      ( 24 Dec 2019 07:48 )             33.8         136  |  101  |  58<H>  ----------------------------<  71  4.6   |  25  |  1.52<H>    Ca    9.3      25 Dec 2019 07:18      PT/INR - ( 24 Dec 2019 08:34 )   PT: 19.9 sec;   INR: 1.70 ratio               Urinalysis Basic - ( 24 Dec 2019 08:39 )    Color: Light Yellow / Appearance: Clear / S.011 / pH: x  Gluc: x / Ketone: Negative  / Bili: Negative / Urobili: <2 mg/dL   Blood: x / Protein: Negative / Nitrite: Positive   Leuk Esterase: Negative / RBC: 1 /HPF / WBC 4 /HPF   Sq Epi: x / Non Sq Epi: 0 /HPF / Bacteria: Few              Thyroid Stimulating Hormone, Serum: 6.79 uIU/mL ( @ 09:12)          Consultant(s) Notes Reviewed:      Care Discussed with Consultants/Other Providers:

## 2019-12-25 NOTE — PROGRESS NOTE ADULT - SUBJECTIVE AND OBJECTIVE BOX
[facility] NEPHROLOGY- PROGRESS NOTE    89y Female with history of DVT on AC, afib s/p PPM presents with SOB. Nephrology consulted for elevated Scr.    REVIEW OF SYSTEMS:  Gen: no changes in weight  Cards: no chest pain  Resp: + dyspnea, + phlegm  GI: no nausea or vomiting or diarrhea  Vascular: no LE edema    No Known Allergies      Hospital Medications: Medications reviewed    VITALS:  T(F): 97.3 (19 @ 09:09), Max: 98.2 (19 @ 02:15)  HR: 62 (19 @ 09:09)  BP: 109/67 (19 @ 09:09)  RR: 18 (19 09:09)  SpO2: 97% (19 09:09)  Wt(kg): --  Height (cm): 172.7 ( 23:17)  Weight (kg): 72.1 ( 23:17)  BMI (kg/m2): 24.2 ( 23:17)  BSA (m2): 1.85 ( 23:17)     @ 07:01  -   @ 07:00  --------------------------------------------------------  IN: 1250 mL / OUT: 0 mL / NET: 1250 mL     @ 07:01  -   @ 10:07  --------------------------------------------------------  IN: 120 mL / OUT: 0 mL / NET: 120 mL        PHYSICAL EXAM:    Gen: NAD, calm  Cards: Irregularly irregular, +S1/S2, no M/G/R  Resp: Decreased BS @ bases B/L  GI: soft, NT/ND, NABS  Vascular: 2+ LE edema B/L with erythema    LABS:      136  |  101  |  58<H>  ----------------------------<  71  4.6   |  25  |  1.52<H>    Ca    9.3      25 Dec 2019 07:18      Creatinine Trend: 1.52 <--, 1.98 <--, 2.02 <--, 2.25 <--, 2.09 <--, 1.66 <--, 1.08 <--, 1.00 <--, 1.02 <--                        10.9   7.52  )-----------( 247      ( 24 Dec 2019 07:48 )             33.8     Urine Studies:  Urinalysis Basic - ( 24 Dec 2019 08:39 )    Color: Light Yellow / Appearance: Clear / S.011 / pH:   Gluc:  / Ketone: Negative  / Bili: Negative / Urobili: <2 mg/dL   Blood:  / Protein: Negative / Nitrite: Positive   Leuk Esterase: Negative / RBC: 1 /HPF / WBC 4 /HPF   Sq Epi:  / Non Sq Epi: 0 /HPF / Bacteria: Few      Sodium, Random Urine: <35 mmol/L ( @ 06:02)  Creatinine, Random Urine: 37 mg/dL ( @ 06:02)      RADIOLOGY & ADDITIONAL STUDIES:  < from: US Kidney and Bladder (19 @ 19:06) >  IMPRESSION:     Bilateral renal cysts.    < end of copied text > [facility] NEPHROLOGY- PROGRESS NOTE    89y Female with history of DVT on AC, afib s/p PPM presents with SOB. Nephrology consulted for elevated Scr.    REVIEW OF SYSTEMS:  Gen: no changes in weight  Cards: no chest pain  Resp: + dyspnea, + phlegm  GI: no nausea or vomiting or diarrhea  Vascular: no LE edema    No Known Allergies      Hospital Medications: Medications reviewed    VITALS:  T(F): 97.3 (19 @ 09:09), Max: 98.2 (19 @ 02:15)  HR: 62 (19 @ 09:09)  BP: 109/67 (19 @ 09:09)  RR: 18 (19 09:09)  SpO2: 97% (19 09:09)  Wt(kg): --  Height (cm): 172.7 ( 23:17)  Weight (kg): 72.1 ( 23:17)  BMI (kg/m2): 24.2 ( 23:17)  BSA (m2): 1.85 ( 23:17)     @ 07:01  -   @ 07:00  --------------------------------------------------------  IN: 1250 mL / OUT: 0 mL / NET: 1250 mL     @ 07:01  -   @ 10:07  --------------------------------------------------------  IN: 120 mL / OUT: 0 mL / NET: 120 mL        PHYSICAL EXAM:    Gen: NAD, calm  Cards: Irregularly irregular, +S1/S2, no M/G/R  Resp: Decreased BS @ bases B/L  GI: soft, NT/ND, NABS  Vascular: no LE edema, + skin wrinkling    LABS:      136  |  101  |  58<H>  ----------------------------<  71  4.6   |  25  |  1.52<H>    Ca    9.3      25 Dec 2019 07:18      Creatinine Trend: 1.52 <--, 1.98 <--, 2.02 <--, 2.25 <--, 2.09 <--, 1.66 <--, 1.08 <--, 1.00 <--, 1.02 <--                        10.9   7.52  )-----------( 247      ( 24 Dec 2019 07:48 )             33.8     Urine Studies:  Urinalysis Basic - ( 24 Dec 2019 08:39 )    Color: Light Yellow / Appearance: Clear / S.011 / pH:   Gluc:  / Ketone: Negative  / Bili: Negative / Urobili: <2 mg/dL   Blood:  / Protein: Negative / Nitrite: Positive   Leuk Esterase: Negative / RBC: 1 /HPF / WBC 4 /HPF   Sq Epi:  / Non Sq Epi: 0 /HPF / Bacteria: Few      Sodium, Random Urine: <35 mmol/L ( @ 06:02)  Creatinine, Random Urine: 37 mg/dL ( @ 06:02)      RADIOLOGY & ADDITIONAL STUDIES:  < from: US Kidney and Bladder (19 @ 19:06) >  IMPRESSION:     Bilateral renal cysts.    < end of copied text >

## 2019-12-25 NOTE — PROGRESS NOTE ADULT - SUBJECTIVE AND OBJECTIVE BOX
CARDIOLOGY     PROGRESS  NOTE   ________________________________________________    CHIEF COMPLAINT:Patient is a 89y old  Female who presents with a chief complaint of chest pain/sob (24 Dec 2019 08:53)  no complain.  	  REVIEW OF SYSTEMS:  CONSTITUTIONAL: No fever, weight loss, or fatigue  EYES: No eye pain, visual disturbances, or discharge  ENT:  No difficulty hearing, tinnitus, vertigo; No sinus or throat pain  NECK: No pain or stiffness  RESPIRATORY: No cough, wheezing, chills or hemoptysis; No Shortness of Breath  CARDIOVASCULAR: No chest pain, palpitations, passing out, dizziness, or leg swelling  GASTROINTESTINAL: No abdominal or epigastric pain. No nausea, vomiting, or hematemesis; No diarrhea or constipation. No melena or hematochezia.  GENITOURINARY: No dysuria, frequency, hematuria, or incontinence  NEUROLOGICAL: No headaches, memory loss, loss of strength, numbness, or tremors  SKIN: No itching, burning, rashes, or lesions   LYMPH Nodes: No enlarged glands  ENDOCRINE: No heat or cold intolerance; No hair loss  MUSCULOSKELETAL: No joint pain or swelling; No muscle, back, or extremity pain  PSYCHIATRIC: No depression, anxiety, mood swings, or difficulty sleeping  HEME/LYMPH: No easy bruising, or bleeding gums  ALLERGY AND IMMUNOLOGIC: No hives or eczema	    [ ] All others negative	  [ ] Unable to obtain    PHYSICAL EXAM:  T(C): 36.5 (12-25-19 @ 05:49), Max: 36.8 (12-25-19 @ 02:15)  HR: 61 (12-25-19 @ 05:49) (60 - 92)  BP: 133/77 (12-25-19 @ 05:49) (97/59 - 133/77)  RR: 17 (12-25-19 @ 05:49) (17 - 18)  SpO2: 94% (12-25-19 @ 05:49) (93% - 95%)  Wt(kg): --  I&O's Summary    24 Dec 2019 07:01  -  25 Dec 2019 07:00  --------------------------------------------------------  IN: 1250 mL / OUT: 0 mL / NET: 1250 mL        Appearance: Normal	  HEENT:   Normal oral mucosa, PERRL, EOMI	  Lymphatic: No lymphadenopathy  Cardiovascular: Normal S1 S2, No JVD, + murmurs, No edema  Respiratory: Lungs clear to auscultation	  Psychiatry: A & O x 3, Mood & affect appropriate  Gastrointestinal:  Soft, Non-tender, + BS	  Skin: No rashes, No ecchymoses, No cyanosis	  Neurologic: Non-focal  Extremities: Normal range of motion, No clubbing, cyanosis or edema  Vascular: Peripheral pulses palpable 2+ bilaterally    MEDICATIONS  (STANDING):  BACItracin   Ointment 1 Application(s) Topical daily  bisacodyl 5 milliGRAM(s) Oral at bedtime  enoxaparin Injectable 30 milliGRAM(s) SubCutaneous daily  hydrALAZINE 20 milliGRAM(s) Oral three times a day  levothyroxine 88 MICROGram(s) Oral daily  lidocaine   Patch 1 Patch Transdermal daily  senna 2 Tablet(s) Oral at bedtime  verapamil  milliGRAM(s) Oral daily      TELEMETRY: 	    ECG:  	  RADIOLOGY:  OTHER: 	  	  LABS:	 	    CARDIAC MARKERS:    < from: Transthoracic Echocardiogram (12.24.19 @ 09:49) >  1. Mild mitral regurgitation.  2. Calcified trileaflet aortic valve with decreased  opening. Peak transaortic valve gradient equals 34 mm Hg,  mean transaortic valve gradient equals 22 mm Hg, estimated  aortic valve area equals 1.3 sqcm (by continuity equation),  aortic valve velocity time integral equals 56 cm,  consistent with moderate to severe aortic stenosis  (severity may be underestimated). Mild aortic  regurgitation.  3. Severely dilated left atrium.  LA volume index = 75  cc/m2.  4. Increased relative wall thickness with normal left  ventricular mass index, consistent with concentric left  ventricular remodeling.  5. Normal left ventricular systolic function. No segmental  wall motion abnormalities. Septal flattening consistent  with right ventricular overload. Septal motion consistent  with conduction abnormality or pacing.  6. Increased E/e'  is consistent with elevated left  ventricular filling pressure.  7. Severe right atrial enlargement.  8. Right ventricular enlargement with decreased right  ventricular systolic function. A device wire is noted in  the right heart.  9. Malcoaptation of the tricuspid valve leaflets. Severe  tricuspid regurgitation.    < end of copied text >                              10.9   7.52  )-----------( 247      ( 24 Dec 2019 07:48 )             33.8     12-25    136  |  101  |  58<H>  ----------------------------<  71  4.6   |  25  |  1.52<H>    Ca    9.3      25 Dec 2019 07:18      proBNP: Serum Pro-Brain Natriuretic Peptide: 5543 pg/mL (12-19 @ 13:19)    Lipid Profile:   HgA1c:   TSH: Thyroid Stimulating Hormone, Serum: 6.79 uIU/mL (12-20 @ 09:12)    PT/INR - ( 24 Dec 2019 08:34 )   PT: 19.9 sec;   INR: 1.70 ratio               Assessment and plan  ---------------------------  chf/pulmonary edema , echo result with sig RV dysfunction and ZIYAD with mod to severe aortic stenosis.  continue cardiac meds   lasix is held sec to increase renal function  ac for a.fib start iv heparin if inr is not therapeutic today, keep inr 2-3  needs to check ppm  physical therapy  aspiration percussion  renal function is improving by decreasing Lasix dose  ?sleep apnea as the cause of RV failure, needs sleep study  chest x ray noted   need to restart lasix or bumex

## 2019-12-25 NOTE — PROGRESS NOTE ADULT - ASSESSMENT
89y Female with history of DVT on AC, afib s/p PPM presents with SOB. Nephrology consulted for elevated Scr.    1) RAAD: Secondary to contrast nephropathy now resolving with supportive care. Patient educated on need for supportive care at this time. UA bland with low FeNa which is often seen in [facility]. Renal US negative for obstructive process. Avoid nephrotoxins.    2) CKD-3: Baseline Scr 1.0 likely age appropriate. Defer CKD work up given advanced age. Monitor electrolytes.    3) HTN with CKD: BP low normal. Continue holding losartan. Avoid hypotension which will hinder renal recovery. Monitor BP.    4) LE edema: Started on bumex 1 mg PO daily this morning as per cardiology. TTE with moderate to severe AS with normal LVSF. Monitor UO.

## 2019-12-25 NOTE — PROGRESS NOTE ADULT - ASSESSMENT
89 yo F      with   h/o      DVT.  A FIB on Coumadin), SSX,  s/p   PPM .    HTN, hypothyroid      , p/w chest pain ans  increasing   worsening x 1wk.  with incessant  cough  for past  week        admitted  with cp. from mlple rib fx's/  no h/o  trauma/ falls  suspect  fx;s are  from persistent cough/   osteoporotic bones, given age  of 89     CHF,   systolic,  acute on chronic/ not on Lasix ,  at home    lasix 20 mg iv          AFIB, on Coumadin   daily inr/  ,SSX,  s/p ppm,  on   verapamil/ losartan     echo /  ppm  interrogation      ct   head,  old  meningoma    ct    abd/ pelvis,   acute Left  7th to  10 tyh rib fx's/   T6  com fx. old/     trauma  eval noted. no intervention  pain meds/    lidocaine patch/   laxatives/  incentive spirometer/   PT  eval  on dig/ lasix/ cozaar/ verapamil/ synthroid at home  AFib.  coagulopathy  on arrival/   RAAD,  could  be from lasix,  stopped/  dig  stopped  creatinine    is 1.5 now   renal u/s, no hydro  coumadin per  inr         per  daughter/ pts  dr little  in [facility] / dr bonner  [facility]  PT eval     < from: CT Head No Cont (12.19.19 @ 21:11) >  IMPRESSION: No acute intracranial hemorrhage.  Unchanged appearing right occipital convexity calcified/ossified meningioma.  Similar-appearing microvascular disease.  < end of copied text >     < from: CT Angio Abdomen and Pelvis w/ IV Cont (12.19.19 @ 17:30) >  BONES: Acute left seventh through 10th rib fractures. Age-indeterminate T6 compression fracture. Spinal degenerative changes. Mild retrolisthesis of L1 on L2 and mild anterolisthesis of L4 and L5.  IMPRESSION: Evidence of mild peripheral interstitial pulmonary edema.  Acute left seventh through 10th rib fractures.  OLGA SADLER M.D., RADIOLOGY RESIDENT 89 yo F      with   h/o      DVT.  A FIB on Coumadin), SSX,  s/p   PPM .    HTN, hypothyroid      , p/w chest pain ans  increasing   worsening x 1wk.  with incessant  cough  for past  week        admitted  with cp. from mlple rib fx's/  no h/o  trauma/ falls  suspect  fx;s are  from persistent cough/   osteoporotic bones, given age  of 89     CHF,   systolic,  acute on chronic/ not on Lasix ,  at home    lasix 20 mg iv          AFIB, on Coumadin   daily inr/  ,SSX,  s/p ppm,  on   verapamil/ losartan     echo /  ppm  interrogation      ct   head,  old  meningoma    ct    abd/ pelvis,   acute Left  7th to  10 tyh rib fx's/   T6  com fx. old/     trauma  eval noted. no intervention  pain meds/    lidocaine patch/   laxatives/  incentive spirometer/   PT  eval  on dig/ lasix/ cozaar/ verapamil/ synthroid at home  AFib.  coagulopathy  on arrival/   RAAD,  could  be from lasix,  stopped/  dig  stopped  creatinine    is 1.5 now   renal u/s, no hydro  echo,   mod  to severe   AS/  severe TR/  RV  dysfunction/ high LV filing pressure  coumadin per  inr/   spoke with daughter   on  cardizem/ hydralazine/bumex      < from: Transthoracic Echocardiogram (12.24.19 @ 09:49) >   Mild mitral regurgitation.  2. Calcified trileaflet aortic valve with decreased  opening. Peak transaortic valve gradient equals 34 mm Hg,  mean transaortic valve gradient equals 22 mm Hg, estimated  aortic valve area equals 1.3 sqcm (by continuity equation),  aortic valve velocity time integral equals 56 cm,  consistent with moderate to severe aortic stenosis  (severity may be underestimated). Mild aortic  regurgitation.  3. Severely dilated left atrium.  LA volume index = 75  cc/m2.  4. Increased relative wall thickness with normal left  ventricular mass index, consistent with concentric left  ventricular remodeling.  5. Normal left ventricular systolic function. No segmental  wall motion abnormalities. Septal flattening consistent  with right ventricular overload. Septal motion consistent  with conduction abnormality or pacing.  6. Increased E/e'  is consistent with elevated left  ventricular filling pressure.  7. Severe right atrial enlargement.  8. Right ventricular enlargement with decreased right  ventricular systolic function. A device wire is noted in  the right heart.  9. Malcoaptation of the tricuspid valve leaflets. Severe  tricuspid regurgitation.    < end of copied text >           per  daughter/ pts     is  in [facility] / dr bonner,  [facility]  PT eval     < from: CT Head No Cont (12.19.19 @ 21:11) >  IMPRESSION: No acute intracranial hemorrhage.  Unchanged appearing right occipital convexity calcified/ossified meningioma.  Similar-appearing microvascular disease.  < end of copied text >     < from: CT Angio Abdomen and Pelvis w/ IV Cont (12.19.19 @ 17:30) >  BONES: Acute left seventh through 10th rib fractures. Age-indeterminate T6 compression fracture. Spinal degenerative changes. Mild retrolisthesis of L1 on L2 and mild anterolisthesis of L4 and L5.  IMPRESSION: Evidence of mild peripheral interstitial pulmonary edema.  Acute left seventh through 10th rib fractures.  OLGA SADLER M.D., RADIOLOGY RESIDENT

## 2019-12-25 NOTE — PROGRESS NOTE ADULT - ATTENDING COMMENTS
[facility] NEPHROLOGY  Zheng Daniels M.D.  Beka Archuleta D.O.  Radha Pimentel M.D.  Corinne Hong, MSN, ANP-C    Telephone: [phone]  Facsimile: [phone]    [address] [place] [address]

## 2019-12-26 ENCOUNTER — TRANSCRIPTION ENCOUNTER (OUTPATIENT)
Age: 84
End: 2019-12-26

## 2019-12-26 VITALS
RESPIRATION RATE: 18 BRPM | TEMPERATURE: 98 F | SYSTOLIC BLOOD PRESSURE: 105 MMHG | OXYGEN SATURATION: 93 % | DIASTOLIC BLOOD PRESSURE: 66 MMHG | HEART RATE: 58 BPM

## 2019-12-26 LAB
ANION GAP SERPL CALC-SCNC: 13 MMOL/L — SIGNIFICANT CHANGE UP (ref 5–17)
BUN SERPL-MCNC: 50 MG/DL — HIGH (ref 7–23)
CALCIUM SERPL-MCNC: 9.2 MG/DL — SIGNIFICANT CHANGE UP (ref 8.4–10.5)
CHLORIDE SERPL-SCNC: 99 MMOL/L — SIGNIFICANT CHANGE UP (ref 96–108)
CO2 SERPL-SCNC: 25 MMOL/L — SIGNIFICANT CHANGE UP (ref 22–31)
CREAT SERPL-MCNC: 1.4 MG/DL — HIGH (ref 0.5–1.3)
GLUCOSE SERPL-MCNC: 61 MG/DL — LOW (ref 70–99)
HCT VFR BLD CALC: 32.7 % — LOW (ref 34.5–45)
HGB BLD-MCNC: 10.8 G/DL — LOW (ref 11.5–15.5)
INR BLD: 3.13 RATIO — HIGH (ref 0.88–1.16)
MCHC RBC-ENTMCNC: 23.4 PG — LOW (ref 27–34)
MCHC RBC-ENTMCNC: 33 GM/DL — SIGNIFICANT CHANGE UP (ref 32–36)
MCV RBC AUTO: 70.9 FL — LOW (ref 80–100)
PLATELET # BLD AUTO: 272 K/UL — SIGNIFICANT CHANGE UP (ref 150–400)
POTASSIUM SERPL-MCNC: 4 MMOL/L — SIGNIFICANT CHANGE UP (ref 3.5–5.3)
POTASSIUM SERPL-SCNC: 4 MMOL/L — SIGNIFICANT CHANGE UP (ref 3.5–5.3)
PROTHROM AB SERPL-ACNC: 37 SEC — HIGH (ref 10–13.1)
RBC # BLD: 4.61 M/UL — SIGNIFICANT CHANGE UP (ref 3.8–5.2)
RBC # FLD: 15.8 % — HIGH (ref 10.3–14.5)
SODIUM SERPL-SCNC: 137 MMOL/L — SIGNIFICANT CHANGE UP (ref 135–145)
WBC # BLD: 7.86 K/UL — SIGNIFICANT CHANGE UP (ref 3.8–10.5)
WBC # FLD AUTO: 7.86 K/UL — SIGNIFICANT CHANGE UP (ref 3.8–10.5)

## 2019-12-26 PROCEDURE — 85027 COMPLETE CBC AUTOMATED: CPT

## 2019-12-26 PROCEDURE — 84132 ASSAY OF SERUM POTASSIUM: CPT

## 2019-12-26 PROCEDURE — 84295 ASSAY OF SERUM SODIUM: CPT

## 2019-12-26 PROCEDURE — 82435 ASSAY OF BLOOD CHLORIDE: CPT

## 2019-12-26 PROCEDURE — 84300 ASSAY OF URINE SODIUM: CPT

## 2019-12-26 PROCEDURE — 82330 ASSAY OF CALCIUM: CPT

## 2019-12-26 PROCEDURE — 76770 US EXAM ABDO BACK WALL COMP: CPT

## 2019-12-26 PROCEDURE — 82803 BLOOD GASES ANY COMBINATION: CPT

## 2019-12-26 PROCEDURE — 87631 RESP VIRUS 3-5 TARGETS: CPT

## 2019-12-26 PROCEDURE — 97116 GAIT TRAINING THERAPY: CPT

## 2019-12-26 PROCEDURE — 99285 EMERGENCY DEPT VISIT HI MDM: CPT

## 2019-12-26 PROCEDURE — 80048 BASIC METABOLIC PNL TOTAL CA: CPT

## 2019-12-26 PROCEDURE — 82570 ASSAY OF URINE CREATININE: CPT

## 2019-12-26 PROCEDURE — 83690 ASSAY OF LIPASE: CPT

## 2019-12-26 PROCEDURE — 93005 ELECTROCARDIOGRAM TRACING: CPT

## 2019-12-26 PROCEDURE — 85014 HEMATOCRIT: CPT

## 2019-12-26 PROCEDURE — 80053 COMPREHEN METABOLIC PANEL: CPT

## 2019-12-26 PROCEDURE — 81001 URINALYSIS AUTO W/SCOPE: CPT

## 2019-12-26 PROCEDURE — 97530 THERAPEUTIC ACTIVITIES: CPT

## 2019-12-26 PROCEDURE — 82947 ASSAY GLUCOSE BLOOD QUANT: CPT

## 2019-12-26 PROCEDURE — 71275 CT ANGIOGRAPHY CHEST: CPT

## 2019-12-26 PROCEDURE — 70450 CT HEAD/BRAIN W/O DYE: CPT

## 2019-12-26 PROCEDURE — 83880 ASSAY OF NATRIURETIC PEPTIDE: CPT

## 2019-12-26 PROCEDURE — 97161 PT EVAL LOW COMPLEX 20 MIN: CPT

## 2019-12-26 PROCEDURE — 85610 PROTHROMBIN TIME: CPT

## 2019-12-26 PROCEDURE — 84484 ASSAY OF TROPONIN QUANT: CPT

## 2019-12-26 PROCEDURE — 93306 TTE W/DOPPLER COMPLETE: CPT

## 2019-12-26 PROCEDURE — 74174 CTA ABD&PLVS W/CONTRAST: CPT

## 2019-12-26 PROCEDURE — 71045 X-RAY EXAM CHEST 1 VIEW: CPT

## 2019-12-26 PROCEDURE — 83605 ASSAY OF LACTIC ACID: CPT

## 2019-12-26 PROCEDURE — 84443 ASSAY THYROID STIM HORMONE: CPT

## 2019-12-26 PROCEDURE — 85730 THROMBOPLASTIN TIME PARTIAL: CPT

## 2019-12-26 RX ORDER — TRAMADOL HYDROCHLORIDE 50 MG/1
0.5 TABLET ORAL
Qty: 0 | Refills: 0 | DISCHARGE
Start: 2019-12-26

## 2019-12-26 RX ORDER — LEVOTHYROXINE SODIUM 125 MCG
1 TABLET ORAL
Qty: 0 | Refills: 0 | DISCHARGE
Start: 2019-12-26

## 2019-12-26 RX ORDER — SENNA PLUS 8.6 MG/1
2 TABLET ORAL
Qty: 0 | Refills: 0 | DISCHARGE
Start: 2019-12-26

## 2019-12-26 RX ORDER — ACETAMINOPHEN 500 MG
2 TABLET ORAL
Qty: 0 | Refills: 0 | DISCHARGE
Start: 2019-12-26

## 2019-12-26 RX ORDER — BUMETANIDE 0.25 MG/ML
1 INJECTION INTRAMUSCULAR; INTRAVENOUS
Qty: 0 | Refills: 0 | DISCHARGE
Start: 2019-12-26

## 2019-12-26 RX ORDER — HYDRALAZINE HCL 50 MG
25 TABLET ORAL
Refills: 0 | Status: DISCONTINUED | OUTPATIENT
Start: 2019-12-26 | End: 2019-12-26

## 2019-12-26 RX ORDER — BACITRACIN ZINC 500 UNIT/G
1 OINTMENT IN PACKET (EA) TOPICAL
Qty: 0 | Refills: 0 | DISCHARGE
Start: 2019-12-26

## 2019-12-26 RX ORDER — HYDRALAZINE HCL 50 MG
1 TABLET ORAL
Qty: 0 | Refills: 0 | DISCHARGE
Start: 2019-12-26

## 2019-12-26 RX ADMIN — Medication 650 MILLIGRAM(S): at 09:00

## 2019-12-26 RX ADMIN — Medication 88 MICROGRAM(S): at 05:43

## 2019-12-26 RX ADMIN — Medication 1 APPLICATION(S): at 11:02

## 2019-12-26 RX ADMIN — LIDOCAINE 1 PATCH: 4 CREAM TOPICAL at 11:02

## 2019-12-26 RX ADMIN — Medication 240 MILLIGRAM(S): at 05:43

## 2019-12-26 RX ADMIN — Medication 650 MILLIGRAM(S): at 08:30

## 2019-12-26 RX ADMIN — LIDOCAINE 1 PATCH: 4 CREAM TOPICAL at 01:20

## 2019-12-26 RX ADMIN — BUMETANIDE 1 MILLIGRAM(S): 0.25 INJECTION INTRAMUSCULAR; INTRAVENOUS at 05:43

## 2019-12-26 NOTE — PROGRESS NOTE ADULT - SUBJECTIVE AND OBJECTIVE BOX
CARDIOLOGY     PROGRESS  NOTE   ________________________________________________    CHIEF COMPLAINT:Patient is a 89y old  Female who presents with a chief complaint of chest pain/sob (25 Dec 2019 10:07)  doing better, c/p pain l ribs.  	  REVIEW OF SYSTEMS:  CONSTITUTIONAL: No fever, weight loss, or fatigue  EYES: No eye pain, visual disturbances, or discharge  ENT:  No difficulty hearing, tinnitus, vertigo; No sinus or throat pain  NECK: No pain or stiffness  RESPIRATORY: No cough, wheezing, chills or hemoptysis; No Shortness of Breath  CARDIOVASCULAR: No chest pain, palpitations, passing out, dizziness, or leg swelling  GASTROINTESTINAL: No abdominal or epigastric pain. No nausea, vomiting, or hematemesis; No diarrhea or constipation. No melena or hematochezia.  GENITOURINARY: No dysuria, frequency, hematuria, or incontinence  NEUROLOGICAL: No headaches, memory loss, loss of strength, numbness, or tremors  SKIN: No itching, burning, rashes, or lesions   LYMPH Nodes: No enlarged glands  ENDOCRINE: No heat or cold intolerance; No hair loss  MUSCULOSKELETAL: No joint pain or swelling; No muscle, back, or extremity pain  PSYCHIATRIC: No depression, anxiety, mood swings, or difficulty sleeping  HEME/LYMPH: No easy bruising, or bleeding gums  ALLERGY AND IMMUNOLOGIC: No hives or eczema	    [ ] All others negative	  [ ] Unable to obtain    PHYSICAL EXAM:  T(C): 36.7 (12-26-19 @ 05:38), Max: 36.9 (12-26-19 @ 00:42)  HR: 60 (12-26-19 @ 05:38) (60 - 65)  BP: 114/61 (12-26-19 @ 05:38) (100/62 - 132/80)  RR: 17 (12-26-19 @ 05:38) (17 - 18)  SpO2: 93% (12-26-19 @ 05:38) (92% - 97%)  Wt(kg): --  I&O's Summary    25 Dec 2019 07:01  -  26 Dec 2019 07:00  --------------------------------------------------------  IN: 1420 mL / OUT: 0 mL / NET: 1420 mL        Appearance: Normal	  HEENT:   Normal oral mucosa, PERRL, EOMI	  Lymphatic: No lymphadenopathy  Cardiovascular: Normal S1 S2, No JVD, = murmurs, No edema  Respiratory: Lungs clear to auscultation	  Psychiatry: A & O x 3, Mood & affect appropriate  Gastrointestinal:  Soft, Non-tender, + BS	  Skin: No rashes, No ecchymoses, No cyanosis	  Neurologic: Non-focal  Extremities: Normal range of motion, No clubbing, cyanosis or edema  Vascular: Peripheral pulses palpable 2+ bilaterally    MEDICATIONS  (STANDING):  BACItracin   Ointment 1 Application(s) Topical daily  bisacodyl 5 milliGRAM(s) Oral at bedtime  buMETAnide 1 milliGRAM(s) Oral daily  diltiazem    milliGRAM(s) Oral daily  hydrALAZINE 20 milliGRAM(s) Oral three times a day  levothyroxine 88 MICROGram(s) Oral daily  lidocaine   Patch 1 Patch Transdermal daily  senna 2 Tablet(s) Oral at bedtime      TELEMETRY: 	    ECG:  	  RADIOLOGY:  OTHER: 	  	  LABS:	 	    CARDIAC MARKERS:            12-26    137  |  99  |  50<H>  ----------------------------<  61<L>  4.0   |  25  |  1.40<H>    Ca    9.2      26 Dec 2019 07:27      proBNP: Serum Pro-Brain Natriuretic Peptide: 5543 pg/mL (12-19 @ 13:19)    Lipid Profile:   HgA1c:   TSH: Thyroid Stimulating Hormone, Serum: 6.79 uIU/mL (12-20 @ 09:12)    PT/INR - ( 25 Dec 2019 08:30 )   PT: 33.4 sec;   INR: 2.84 ratio       < from: Transthoracic Echocardiogram (12.24.19 @ 09:49) >  1. Mild mitral regurgitation.  2. Calcified trileaflet aortic valve with decreased  opening. Peak transaortic valve gradient equals 34 mm Hg,  mean transaortic valve gradient equals 22 mm Hg, estimated  aortic valve area equals 1.3 sqcm (by continuity equation),  aortic valve velocity time integral equals 56 cm,  consistent with moderate to severe aortic stenosis  (severity may be underestimated). Mild aortic  regurgitation.  3. Severely dilated left atrium.  LA volume index = 75  cc/m2.  4. Increased relative wall thickness with normal left  ventricular mass index, consistent with concentric left  ventricular remodeling.  5. Normal left ventricular systolic function. No segmental  wall motion abnormalities. Septal flattening consistent  with right ventricular overload. Septal motion consistent  with conduction abnormality or pacing.  6. Increased E/e'  is consistent with elevated left  ventricular filling pressure.  7. Severe right atrial enlargement.  8. Right ventricular enlargement with decreased right  ventricular systolic function. A device wire is noted in  the right heart.  9. Malcoaptation of the tricuspid valve leaflets. Severe  tricuspid regurgitation.    < end of copied text >          Assessment and plan  ---------------------------  chf/pulmonary edema , echo result with sig RV dysfunction and ZIYAD with mod to severe aortic stenosis.  continue cardiac meds   lasix is held sec to increase renal function  ac for a.fib start iv heparin if inr is not therapeutic today, keep inr 2-3  needs to check ppm  physical therapy  aspiration percussion  renal function is improving by decreasing Lasix dose  ?sleep apnea as the cause of RV failure, spoke to the daughter pt has history of sleep apnea but is not wearing CPAP machine.  chest x ray noted   need to restart lasix or bumex  decrease hydralazine 25 mg bid

## 2019-12-26 NOTE — PROGRESS NOTE ADULT - SUBJECTIVE AND OBJECTIVE BOX
[facility] NEPHROLOGY- PROGRESS NOTE    89y Female with history of DVT on AC, afib s/p PPM presents with SOB. Nephrology consulted for elevated Scr.    REVIEW OF SYSTEMS:  Gen: no changes in weight  Cards: no chest pain  Resp: + dyspnea, + phlegm  GI: no nausea or vomiting or diarrhea  Vascular: no LE edema    No Known Allergies      Hospital Medications: Medications reviewed      VITALS:  T(F): 98.2 (19 @ 11:19), Max: 98.4 (19 @ 00:42)  HR: 58 (19 @ 11:19)  BP: 105/66 (19 @ 11:19)  RR: 18 (19 @ 11:19)  SpO2: 93% (19 @ 11:19)  Wt(kg): --     @ 07:01  -   @ 07:00  --------------------------------------------------------  IN: 1420 mL / OUT: 0 mL / NET: 1420 mL     @ 07:01  -   @ 12:43  --------------------------------------------------------  IN: 240 mL / OUT: 0 mL / NET: 240 mL      PHYSICAL EXAM:    Gen: NAD, calm  Cards: Irregularly irregular, +S1/S2, no M/G/R  Resp: Decreased BS @ bases B/L  GI: soft, NT/ND, NABS  Vascular: no LE edema, + skin wrinkling      LABS:      137  |  99  |  50<H>  ----------------------------<  61<L>  4.0   |  25  |  1.40<H>    Ca    9.2      26 Dec 2019 07:27      Creatinine Trend: 1.40 <--, 1.52 <--, 1.98 <--, 2.02 <--, 2.25 <--, 2.09 <--, 1.66 <--, 1.08 <--, 1.00 <--, 1.02 <--                        10.8   7.86  )-----------( 272      ( 26 Dec 2019 08:47 )             32.7     Urine Studies:  Urinalysis Basic - ( 24 Dec 2019 08:39 )    Color: Light Yellow / Appearance: Clear / S.011 / pH:   Gluc:  / Ketone: Negative  / Bili: Negative / Urobili: <2 mg/dL   Blood:  / Protein: Negative / Nitrite: Positive   Leuk Esterase: Negative / RBC: 1 /HPF / WBC 4 /HPF   Sq Epi:  / Non Sq Epi: 0 /HPF / Bacteria: Few      Sodium, Random Urine: <35 mmol/L ( @ 06:02)  Creatinine, Random Urine: 37 mg/dL ( @ 06:02)

## 2019-12-26 NOTE — PROGRESS NOTE ADULT - REASON FOR ADMISSION
chest pain/sob

## 2019-12-26 NOTE — PROGRESS NOTE ADULT - SUBJECTIVE AND OBJECTIVE BOX
no  complaints    REVIEW OF SYSTEMS:  GEN: no fever,    no chills  RESP: no SOB,   no cough  CVS: no chest pain,   no palpitations  GI: no abdominal pain,   no nausea,   no vomiting,   no constipation,   no diarrhea  : no dysuria,   no frequency  NEURO: no headache,   no dizziness  PSYCH: no depression,   not anxious  Derm : no rash    MEDICATIONS  (STANDING):  BACItracin   Ointment 1 Application(s) Topical daily  bisacodyl 5 milliGRAM(s) Oral at bedtime  buMETAnide 1 milliGRAM(s) Oral daily  diltiazem    milliGRAM(s) Oral daily  hydrALAZINE 25 milliGRAM(s) Oral two times a day  levothyroxine 88 MICROGram(s) Oral daily  lidocaine   Patch 1 Patch Transdermal daily  senna 2 Tablet(s) Oral at bedtime    MEDICATIONS  (PRN):  acetaminophen   Tablet .. 650 milliGRAM(s) Oral every 6 hours PRN Mild Pain (1 - 3), Moderate Pain (4 - 6)  guaiFENesin   Syrup  (Sugar-Free) 100 milliGRAM(s) Oral every 6 hours PRN Cough  sodium chloride 3%  Inhalation 3 milliLiter(s) Inhalation every 6 hours PRN cough/dry secretion  traMADol 25 milliGRAM(s) Oral two times a day PRN Severe Pain (7 - 10)      Vital Signs Last 24 Hrs  T(C): 36.3 (26 Dec 2019 08:00), Max: 36.9 (26 Dec 2019 00:42)  T(F): 97.4 (26 Dec 2019 08:00), Max: 98.4 (26 Dec 2019 00:42)  HR: 60 (26 Dec 2019 08:00) (60 - 65)  BP: 114/68 (26 Dec 2019 08:00) (100/62 - 132/80)  BP(mean): --  RR: 18 (26 Dec 2019 08:00) (17 - 18)  SpO2: 93% (26 Dec 2019 08:00) (92% - 95%)  CAPILLARY BLOOD GLUCOSE        I&O's Summary    25 Dec 2019 07:01  -  26 Dec 2019 07:00  --------------------------------------------------------  IN: 1420 mL / OUT: 0 mL / NET: 1420 mL    26 Dec 2019 07:01  -  26 Dec 2019 09:11  --------------------------------------------------------  IN: 240 mL / OUT: 0 mL / NET: 240 mL        PHYSICAL EXAM:  HEAD:  Atraumatic, Normocephalic  NECK: Supple, No   JVD  CHEST/LUNG:   no     rales,     no,    rhonchi  HEART: Regular rate and rhythm;         murmur  ABDOMEN: Soft, Nontender, ;   EXTREMITIES:   no     edema  NEUROLOGY:  alert    LABS:                        10.8   7.86  )-----------( 272      ( 26 Dec 2019 08:47 )             32.7     12-26    137  |  99  |  50<H>  ----------------------------<  61<L>  4.0   |  25  |  1.40<H>    Ca    9.2      26 Dec 2019 07:27      PT/INR - ( 25 Dec 2019 08:30 )   PT: 33.4 sec;   INR: 2.84 ratio                         Thyroid Stimulating Hormone, Serum: 6.79 uIU/mL (12-20 @ 09:12)          Consultant(s) Notes Reviewed:      Care Discussed with Consultants/Other Providers:

## 2019-12-26 NOTE — PROGRESS NOTE ADULT - ATTENDING COMMENTS
[facility] NEPHROLOGY  Zheng Daniels M.D.  Beka Archuleta D.O.  Radha Pimentel M.D.  Corinne Hong, MSN, ANP-C    Telephone: [phone]  Facsimile: [phone]    [address] [place] [address]

## 2019-12-26 NOTE — PROGRESS NOTE ADULT - ASSESSMENT
89y Female with history of DVT on AC, afib s/p PPM presents with SOB. Nephrology consulted for elevated Scr.    1) RAAD: Secondary to contrast nephropathy now resolving. Patient educated on need for supportive care at this time. UA bland with low FeNa which is often seen in [facility]. Renal US negative for obstructive process. Avoid nephrotoxins.    2) CKD-3: Baseline Scr 1.0 likely age appropriate. Defer CKD work up given advanced age. Monitor electrolytes.    3) HTN with CKD: BP low normal. Continue holding losartan on discharge given resolving RAAD. Avoid hypotension which will hinder renal recovery. Monitor BP.    4) LE edema: On bumex 1 mg PO daily as per cardiology. TTE with moderate to severe AS with normal LVSF. Monitor UO.

## 2019-12-26 NOTE — DISCHARGE NOTE NURSING/CASE MANAGEMENT/SOCIAL WORK - PATIENT PORTAL LINK FT
You can access the FollowMyHealth Patient Portal offered by [facility] by registering at the following website: [email or web address]. By joining [employer]’s FollowMyHealth portal, you will also be able to view your health information using other applications (apps) compatible with our system.

## 2019-12-26 NOTE — PROGRESS NOTE ADULT - ASSESSMENT
89 yo F      with   h/o      DVT.  A FIB on Coumadin), SSX,  s/p   PPM .    HTN, hypothyroid      , p/w chest pain ans  increasing   worsening x 1wk.  with incessant  cough  for past  week        admitted  with cp. from mlple rib fx's/  no h/o  trauma/ falls  suspect  fx;s are  from persistent cough/   osteoporotic bones, given age  of 89     CHF,   systolic,  acute on chronic/ not on Lasix ,  at home    lasix 20 mg iv          AFIB, on Coumadin   daily inr/  ,SSX,  s/p ppm,  on   verapamil/ losartan     echo /  ppm  interrogation      ct   head,  old  meningoma    ct    abd/ pelvis,   acute Left  7th to  10 tyh rib fx's/   T6  com fx. old/     trauma  eval noted. no intervention  pain meds/    lidocaine patch/   laxatives/  incentive spirometer/   PT  eval  on dig/ lasix/ cozaar/ verapamil/ synthroid at home  AFib.  coagulopathy  on arrival/   RAAD,  could  be from lasix,  stopped/  dig  stopped  creatinine    is 1.4 ow   renal u/s, no hydro  echo,   mod  to severe   AS/  severe TR/  RV  dysfunction/ high LV filing pressure  coumadin per  inr/   spoke with daughter   on  cardizem/ hydralazine/bumex   inr  pending/  start  d/c  planning     < from: Transthoracic Echocardiogram (12.24.19 @ 09:49) >   Mild mitral regurgitation.  2. Calcified trileaflet aortic valve with decreased  opening. Peak transaortic valve gradient equals 34 mm Hg,  mean transaortic valve gradient equals 22 mm Hg, estimated  aortic valve area equals 1.3 sqcm (by continuity equation),  aortic valve velocity time integral equals 56 cm,  consistent with moderate to severe aortic stenosis  (severity may be underestimated). Mild aortic  regurgitation.  3. Severely dilated left atrium.  LA volume index = 75  cc/m2.  4. Increased relative wall thickness with normal left  ventricular mass index, consistent with concentric left  ventricular remodeling.  5. Normal left ventricular systolic function. No segmental  wall motion abnormalities. Septal flattening consistent  with right ventricular overload. Septal motion consistent  with conduction abnormality or pacing.  6. Increased E/e'  is consistent with elevated left  ventricular filling pressure.  7. Severe right atrial enlargement.  8. Right ventricular enlargement with decreased right  ventricular systolic function. A device wire is noted in  the right heart.  9. Malcoaptation of the tricuspid valve leaflets. Severe  tricuspid regurgitation.    < end of copied text >           per  daughter/ pts     is  in [facility] / dr bonner  [facility]  PT eval     < from: CT Head No Cont (12.19.19 @ 21:11) >  IMPRESSION: No acute intracranial hemorrhage.  Unchanged appearing right occipital convexity calcified/ossified meningioma.  Similar-appearing microvascular disease.  < end of copied text >     < from: CT Angio Abdomen and Pelvis w/ IV Cont (12.19.19 @ 17:30) >  BONES: Acute left seventh through 10th rib fractures. Age-indeterminate T6 compression fracture. Spinal degenerative changes. Mild retrolisthesis of L1 on L2 and mild anterolisthesis of L4 and L5.  IMPRESSION: Evidence of mild peripheral interstitial pulmonary edema.  Acute left seventh through 10th rib fractures.  OLGA SADLER M.D., RADIOLOGY RESIDENT 89 yo F      with   h/o      DVT.  A FIB on Coumadin), SSX,  s/p   PPM .    HTN, hypothyroid      , p/w chest pain ans  increasing   worsening x 1wk.  with incessant  cough  for past  week        admitted  with cp. from mlple rib fx's/  no h/o  trauma/ falls  suspect  fx;s are  from persistent cough/   osteoporotic bones, given age  of 89     CHF,   systolic,  acute on chronic/ not on Lasix ,  at home    lasix 20 mg iv          AFIB, on Coumadin   daily inr/  ,SSX,  s/p ppm,  on   verapamil/ losartan     echo /  ppm  interrogation      ct   head,  old  meningoma    ct    abd/ pelvis,   acute Left  7th to  10 tyh rib fx's/   T6  com fx. old/     trauma  eval noted. no intervention  pain meds/    lidocaine patch/   laxatives/  incentive spirometer/   PT  eval  on dig/ lasix/ cozaar/ verapamil/ synthroid at home  AFib.  coagulopathy  on arrival/   RAAD,  could  be from lasix,  stopped/  dig  stopped  creatinine    is 1.4 ow   renal u/s, no hydro  echo,   mod  to severe   AS/  severe TR/  RV  dysfunction/ high LV filing pressure  coumadin per  inr/   spoke with daughter   on  cardizem/ hydralazine/bumex   inr  pending/  start  d/c  written for  rehab   needs  daily inr  for  coumadin/  spoke  with family     < from: Transthoracic Echocardiogram (12.24.19 @ 09:49) >   Mild mitral regurgitation.  2. Calcified trileaflet aortic valve with decreased  opening. Peak transaortic valve gradient equals 34 mm Hg,  mean transaortic valve gradient equals 22 mm Hg, estimated  aortic valve area equals 1.3 sqcm (by continuity equation),  aortic valve velocity time integral equals 56 cm,  consistent with moderate to severe aortic stenosis  (severity may be underestimated). Mild aortic  regurgitation.  3. Severely dilated left atrium.  LA volume index = 75  cc/m2.  4. Increased relative wall thickness with normal left  ventricular mass index, consistent with concentric left  ventricular remodeling.  5. Normal left ventricular systolic function. No segmental  wall motion abnormalities. Septal flattening consistent  with right ventricular overload. Septal motion consistent  with conduction abnormality or pacing.  6. Increased E/e'  is consistent with elevated left  ventricular filling pressure.  7. Severe right atrial enlargement.  8. Right ventricular enlargement with decreased right  ventricular systolic function. A device wire is noted in  the right heart.  9. Malcoaptation of the tricuspid valve leaflets. Severe  tricuspid regurgitation.    < end of copied text >           per  daughter/ pts     is  in [facility] / dr bonner   [facility]  PT eval     < from: CT Head No Cont (12.19.19 @ 21:11) >  IMPRESSION: No acute intracranial hemorrhage.  Unchanged appearing right occipital convexity calcified/ossified meningioma.  Similar-appearing microvascular disease.  < end of copied text >     < from: CT Angio Abdomen and Pelvis w/ IV Cont (12.19.19 @ 17:30) >  BONES: Acute left seventh through 10th rib fractures. Age-indeterminate T6 compression fracture. Spinal degenerative changes. Mild retrolisthesis of L1 on L2 and mild anterolisthesis of L4 and L5.  IMPRESSION: Evidence of mild peripheral interstitial pulmonary edema.  Acute left seventh through 10th rib fractures.  OLGA SADLER M.D., RADIOLOGY RESIDENT

## 2020-01-05 ENCOUNTER — INPATIENT (INPATIENT)
Facility: HOSPITAL | Age: 85
LOS: 10 days | DRG: 291 | End: 2020-01-16
Attending: INTERNAL MEDICINE | Admitting: INTERNAL MEDICINE
Payer: MEDICARE

## 2020-01-05 VITALS
OXYGEN SATURATION: 90 % | HEART RATE: 89 BPM | RESPIRATION RATE: 24 BRPM | TEMPERATURE: 99 F | SYSTOLIC BLOOD PRESSURE: 144 MMHG | DIASTOLIC BLOOD PRESSURE: 91 MMHG

## 2020-01-05 DIAGNOSIS — I50.9 HEART FAILURE, UNSPECIFIED: ICD-10-CM

## 2020-01-05 LAB
ALBUMIN SERPL ELPH-MCNC: 4 G/DL — SIGNIFICANT CHANGE UP (ref 3.3–5)
ALP SERPL-CCNC: 173 U/L — HIGH (ref 40–120)
ALT FLD-CCNC: 20 U/L — SIGNIFICANT CHANGE UP (ref 10–45)
ANION GAP SERPL CALC-SCNC: 16 MMOL/L — SIGNIFICANT CHANGE UP (ref 5–17)
APTT BLD: 33.9 SEC — SIGNIFICANT CHANGE UP (ref 27.5–36.3)
AST SERPL-CCNC: 25 U/L — SIGNIFICANT CHANGE UP (ref 10–40)
BASE EXCESS BLDV CALC-SCNC: 0 MMOL/L — SIGNIFICANT CHANGE UP (ref -2–2)
BASE EXCESS BLDV CALC-SCNC: 0.4 MMOL/L — SIGNIFICANT CHANGE UP (ref -2–2)
BILIRUB SERPL-MCNC: 0.6 MG/DL — SIGNIFICANT CHANGE UP (ref 0.2–1.2)
BUN SERPL-MCNC: 39 MG/DL — HIGH (ref 7–23)
CA-I SERPL-SCNC: 1.17 MMOL/L — SIGNIFICANT CHANGE UP (ref 1.12–1.3)
CA-I SERPL-SCNC: 1.22 MMOL/L — SIGNIFICANT CHANGE UP (ref 1.12–1.3)
CALCIUM SERPL-MCNC: 9.6 MG/DL — SIGNIFICANT CHANGE UP (ref 8.4–10.5)
CHLORIDE BLDV-SCNC: 100 MMOL/L — SIGNIFICANT CHANGE UP (ref 96–108)
CHLORIDE BLDV-SCNC: 101 MMOL/L — SIGNIFICANT CHANGE UP (ref 96–108)
CHLORIDE SERPL-SCNC: 93 MMOL/L — LOW (ref 96–108)
CO2 BLDV-SCNC: 26 MMOL/L — SIGNIFICANT CHANGE UP (ref 22–30)
CO2 BLDV-SCNC: 27 MMOL/L — SIGNIFICANT CHANGE UP (ref 22–30)
CO2 SERPL-SCNC: 20 MMOL/L — LOW (ref 22–31)
CREAT SERPL-MCNC: 1.27 MG/DL — SIGNIFICANT CHANGE UP (ref 0.5–1.3)
GAS PNL BLDV: 133 MMOL/L — LOW (ref 135–145)
GAS PNL BLDV: 133 MMOL/L — LOW (ref 135–145)
GAS PNL BLDV: SIGNIFICANT CHANGE UP
GLUCOSE BLDV-MCNC: 200 MG/DL — HIGH (ref 70–99)
GLUCOSE BLDV-MCNC: 218 MG/DL — HIGH (ref 70–99)
GLUCOSE SERPL-MCNC: 235 MG/DL — HIGH (ref 70–99)
HCO3 BLDV-SCNC: 25 MMOL/L — SIGNIFICANT CHANGE UP (ref 21–29)
HCO3 BLDV-SCNC: 25 MMOL/L — SIGNIFICANT CHANGE UP (ref 21–29)
HCT VFR BLD CALC: 35.3 % — SIGNIFICANT CHANGE UP (ref 34.5–45)
HCT VFR BLDA CALC: 31 % — LOW (ref 39–50)
HCT VFR BLDA CALC: 34 % — LOW (ref 39–50)
HGB BLD CALC-MCNC: 10.1 G/DL — LOW (ref 11.5–15.5)
HGB BLD CALC-MCNC: 11.1 G/DL — LOW (ref 11.5–15.5)
HGB BLD-MCNC: 11 G/DL — LOW (ref 11.5–15.5)
INR BLD: 1.98 RATIO — HIGH (ref 0.88–1.16)
LACTATE BLDV-MCNC: 3.2 MMOL/L — HIGH (ref 0.7–2)
LACTATE BLDV-MCNC: 3.2 MMOL/L — HIGH (ref 0.7–2)
MAGNESIUM SERPL-MCNC: 2.4 MG/DL — SIGNIFICANT CHANGE UP (ref 1.6–2.6)
MCHC RBC-ENTMCNC: 22.4 PG — LOW (ref 27–34)
MCHC RBC-ENTMCNC: 31.2 GM/DL — LOW (ref 32–36)
MCV RBC AUTO: 71.9 FL — LOW (ref 80–100)
NRBC # BLD: 0 /100 WBCS — SIGNIFICANT CHANGE UP (ref 0–0)
NT-PROBNP SERPL-SCNC: 8930 PG/ML — HIGH (ref 0–300)
PCO2 BLDV: 43 MMHG — SIGNIFICANT CHANGE UP (ref 35–50)
PCO2 BLDV: 47 MMHG — SIGNIFICANT CHANGE UP (ref 35–50)
PH BLDV: 7.35 — SIGNIFICANT CHANGE UP (ref 7.35–7.45)
PH BLDV: 7.38 — SIGNIFICANT CHANGE UP (ref 7.35–7.45)
PLATELET # BLD AUTO: 362 K/UL — SIGNIFICANT CHANGE UP (ref 150–400)
PO2 BLDV: 45 MMHG — SIGNIFICANT CHANGE UP (ref 25–45)
PO2 BLDV: 57 MMHG — HIGH (ref 25–45)
POTASSIUM BLDV-SCNC: 4.1 MMOL/L — SIGNIFICANT CHANGE UP (ref 3.5–5.3)
POTASSIUM BLDV-SCNC: 4.6 MMOL/L — SIGNIFICANT CHANGE UP (ref 3.5–5.3)
POTASSIUM SERPL-MCNC: 4.6 MMOL/L — SIGNIFICANT CHANGE UP (ref 3.5–5.3)
POTASSIUM SERPL-SCNC: 4.6 MMOL/L — SIGNIFICANT CHANGE UP (ref 3.5–5.3)
PROT SERPL-MCNC: 8 G/DL — SIGNIFICANT CHANGE UP (ref 6–8.3)
PROTHROM AB SERPL-ACNC: 23 SEC — HIGH (ref 10–12.9)
RBC # BLD: 4.91 M/UL — SIGNIFICANT CHANGE UP (ref 3.8–5.2)
RBC # FLD: 15.9 % — HIGH (ref 10.3–14.5)
SAO2 % BLDV: 80 % — SIGNIFICANT CHANGE UP (ref 67–88)
SAO2 % BLDV: 88 % — SIGNIFICANT CHANGE UP (ref 67–88)
SODIUM SERPL-SCNC: 129 MMOL/L — LOW (ref 135–145)
TROPONIN T, HIGH SENSITIVITY RESULT: 33 NG/L — SIGNIFICANT CHANGE UP (ref 0–51)
WBC # BLD: 5.54 K/UL — SIGNIFICANT CHANGE UP (ref 3.8–10.5)
WBC # FLD AUTO: 5.54 K/UL — SIGNIFICANT CHANGE UP (ref 3.8–10.5)

## 2020-01-05 PROCEDURE — 71045 X-RAY EXAM CHEST 1 VIEW: CPT | Mod: 26

## 2020-01-05 PROCEDURE — 99291 CRITICAL CARE FIRST HOUR: CPT | Mod: GC

## 2020-01-05 PROCEDURE — 99232 SBSQ HOSP IP/OBS MODERATE 35: CPT

## 2020-01-05 PROCEDURE — 93010 ELECTROCARDIOGRAM REPORT: CPT

## 2020-01-05 RX ORDER — FUROSEMIDE 40 MG
40 TABLET ORAL ONCE
Refills: 0 | Status: COMPLETED | OUTPATIENT
Start: 2020-01-05 | End: 2020-01-05

## 2020-01-05 RX ORDER — IPRATROPIUM/ALBUTEROL SULFATE 18-103MCG
3 AEROSOL WITH ADAPTER (GRAM) INHALATION
Qty: 0 | Refills: 0 | DISCHARGE

## 2020-01-05 RX ORDER — LEVOTHYROXINE SODIUM 125 MCG
88 TABLET ORAL DAILY
Refills: 0 | Status: DISCONTINUED | OUTPATIENT
Start: 2020-01-05 | End: 2020-01-13

## 2020-01-05 RX ORDER — DILTIAZEM HCL 120 MG
240 CAPSULE, EXT RELEASE 24 HR ORAL DAILY
Refills: 0 | Status: DISCONTINUED | OUTPATIENT
Start: 2020-01-05 | End: 2020-01-09

## 2020-01-05 RX ORDER — BUMETANIDE 0.25 MG/ML
1 INJECTION INTRAMUSCULAR; INTRAVENOUS DAILY
Refills: 0 | Status: DISCONTINUED | OUTPATIENT
Start: 2020-01-05 | End: 2020-01-08

## 2020-01-05 RX ORDER — ACETAMINOPHEN 500 MG
650 TABLET ORAL ONCE
Refills: 0 | Status: COMPLETED | OUTPATIENT
Start: 2020-01-05 | End: 2020-01-05

## 2020-01-05 RX ORDER — ACETAMINOPHEN 500 MG
2 TABLET ORAL
Qty: 0 | Refills: 0 | DISCHARGE

## 2020-01-05 RX ORDER — WARFARIN SODIUM 2.5 MG/1
1 TABLET ORAL
Qty: 0 | Refills: 0 | DISCHARGE

## 2020-01-05 RX ORDER — DILTIAZEM HCL 120 MG
1 CAPSULE, EXT RELEASE 24 HR ORAL
Qty: 0 | Refills: 0 | DISCHARGE

## 2020-01-05 RX ORDER — BUMETANIDE 0.25 MG/ML
1 INJECTION INTRAMUSCULAR; INTRAVENOUS DAILY
Refills: 0 | Status: DISCONTINUED | OUTPATIENT
Start: 2020-01-05 | End: 2020-01-05

## 2020-01-05 RX ORDER — WARFARIN SODIUM 2.5 MG/1
3 TABLET ORAL ONCE
Refills: 0 | Status: COMPLETED | OUTPATIENT
Start: 2020-01-05 | End: 2020-01-05

## 2020-01-05 RX ORDER — HYDRALAZINE HCL 50 MG
25 TABLET ORAL
Refills: 0 | Status: DISCONTINUED | OUTPATIENT
Start: 2020-01-05 | End: 2020-01-13

## 2020-01-05 RX ORDER — BUMETANIDE 0.25 MG/ML
1 INJECTION INTRAMUSCULAR; INTRAVENOUS
Qty: 0 | Refills: 0 | DISCHARGE

## 2020-01-05 RX ORDER — SENNA PLUS 8.6 MG/1
2 TABLET ORAL AT BEDTIME
Refills: 0 | Status: DISCONTINUED | OUTPATIENT
Start: 2020-01-05 | End: 2020-01-13

## 2020-01-05 RX ADMIN — Medication 650 MILLIGRAM(S): at 21:13

## 2020-01-05 RX ADMIN — Medication 40 MILLIGRAM(S): at 14:57

## 2020-01-05 RX ADMIN — Medication 40 MILLIGRAM(S): at 16:44

## 2020-01-05 RX ADMIN — Medication 650 MILLIGRAM(S): at 22:08

## 2020-01-05 RX ADMIN — WARFARIN SODIUM 3 MILLIGRAM(S): 2.5 TABLET ORAL at 21:16

## 2020-01-05 NOTE — H&P ADULT - HISTORY OF PRESENT ILLNESS
89y Female complaining of difficulty breathing.	      89F pmhx of     CHF, Afib,   Pacemaker,   HTN        presents from nursing facility for SOB and course breath sounds      .  Patient AOx3, states that the cough and SOB started 3 days ago. Denies fevers, chills, N/V/D, chest pain.

## 2020-01-05 NOTE — H&P ADULT - ASSESSMENT
89 yo F      with   h/o      DVT.  A FIB on Coumadin,     SSX,  s/p   PPM .    HTN,  hypothyroid  old  menigioma on ct.        recne  admission fpr  . from mlple rib fx's/  Left  7 to  10 th  ribs. no h/o  trauma/ falls  suspect  fx;s are  from persistent cough/   osteoporotic bones, given age  of 89     CHF,   systolic,  acute on chronic/  RV  failure/ pt refuses  nocturnal  bipap        now,  admitted with   sob/  acute resp failure/ on bipap  in er   from acute  diastolic  chf/  elevated bnp       AFIB,  ,SSX,/  PPM . on Coumadin.  dialy  inr  echo,  12/24/ 19,   mod  to severe   AS/  severe TR/  RV  dysfunction/ high LV filing pressuredaily  INR    on  cardizem/ hydralazine/bumex/  at  n home /  card  eval  iv  lasix       per  daughter/ pts  dr little  in [facility] / dr bonner,  [facility]  PT eval       < from: Transthoracic Echocardiogram (12.24.19 @ 09:49) >   Mild mitral regurgitation.  2. Calcified trileaflet aortic valve with decreased  opening. Peak transaortic valve gradient equals 34 mm Hg,  mean transaortic valve gradient equals 22 mm Hg, estimated  aortic valve area equals 1.3 sqcm (by continuity equation),  aortic valve velocity time integral equals 56 cm,  consistent with moderate to severe aortic stenosis  (severity may be underestimated). Mild aortic  regurgitation.  3. Severely dilated left atrium.  LA volume index = 75  cc/m2.  4. Increased relative wall thickness with normal left  ventricular mass index, consistent with concentric left  ventricular remodeling.  5. Normal left ventricular systolic function. No segmental  wall motion abnormalities. Septal flattening consistent  with right ventricular overload. Septal motion consistent  with conduction abnormality or pacing.  6. Increased E/e'  is consistent with elevated left  ventricular filling pressure.  7. Severe right atrial enlargement.  8. Right ventricular enlargement with decreased right  ventricular systolic function. A device wire is noted in  the right heart.  9. Malcoaptation of the tricuspid valve leaflets. Severe  tricuspid regurgitation.    < end of copied text >     < from: CT Head No Cont (12.19.19 @ 21:11) >  IMPRESSION: No acute intracranial hemorrhage.  Unchanged appearing right occipital convexity calcified/ossified meningioma.  Similar-appearing microvascular disease.  < end of copied text >     < from: CT Angio Abdomen and Pelvis w/ IV Cont (12.19.19 @ 17:30) >  BONES: Acute left seventh through 10th rib fractures. Age-indeterminate T6 compression fracture. Spinal degenerative changes. Mild retrolisthesis of L1 on L2 and mild anterolisthesis of L4 and L5.  IMPRESSION: Evidence of mild peripheral interstitial pulmonary edema.  Acute left seventh through 10th rib fractures.  OLGA SADLER M.D., RADIOLOGY RESIDENT 89 yo F      with   h/o      DVT.  A FIB on Coumadin,     SSX,  s/p   PPM .    HTN,  hypothyroid  old  menigioma on ct.        recne  admission on 12/ 19,  for,  from mlple rib fx's/  Left  7 to  10 th  ribs. no h/o  trauma/ falls  suspect  fx;s are  from persistent cough/   osteoporotic bones, given age  of 89     CHF,   systolic,  acute on chronic/  RV  failure/ pt refuses  nocturnal  bipap        now,  admitted with   sob/  acute resp failure/ on bipap  in er   from acute  diastolic  chf/  elevated bnp  normal  EF/  probable severe AS/ and  severe TR ,  and  RV   dysfunction         AFIB,  ,SSX,/  PPM . on Coumadin.  daily  inr  echo,  12/24/ 19,   mod  to severe   AS/  severe TR/  RV  dysfunction/ high LV filing pressure    on  cardizem/ hydralazine/bumex/  at  n home /  card  eval  iv  bumex       per  daughter/ pts  dr little  in [facility] / dr bonner,  [facility]  PT eval       < from: Transthoracic Echocardiogram (12.24.19 @ 09:49) >   Mild mitral regurgitation.  2. Calcified trileaflet aortic valve with decreased  opening. Peak transaortic valve gradient equals 34 mm Hg,  mean transaortic valve gradient equals 22 mm Hg, estimated  aortic valve area equals 1.3 sqcm (by continuity equation),  aortic valve velocity time integral equals 56 cm,  consistent with moderate to severe aortic stenosis  (severity may be underestimated). Mild aortic  regurgitation.  3. Severely dilated left atrium.  LA volume index = 75  cc/m2.  4. Increased relative wall thickness with normal left  ventricular mass index, consistent with concentric left  ventricular remodeling.  5. Normal left ventricular systolic function. No segmental  wall motion abnormalities. Septal flattening consistent  with right ventricular overload. Septal motion consistent  with conduction abnormality or pacing.  6. Increased E/e'  is consistent with elevated left  ventricular filling pressure.  7. Severe right atrial enlargement.  8. Right ventricular enlargement with decreased right  ventricular systolic function. A device wire is noted in  the right heart.  9. Malcoaptation of the tricuspid valve leaflets. Severe  tricuspid regurgitation.    < end of copied text >     < from: CT Head No Cont (12.19.19 @ 21:11) >  IMPRESSION: No acute intracranial hemorrhage.  Unchanged appearing right occipital convexity calcified/ossified meningioma.  Similar-appearing microvascular disease.  < end of copied text >     < from: CT Angio Abdomen and Pelvis w/ IV Cont (12.19.19 @ 17:30) >  BONES: Acute left seventh through 10th rib fractures. Age-indeterminate T6 compression fracture. Spinal degenerative changes. Mild retrolisthesis of L1 on L2 and mild anterolisthesis of L4 and L5.  IMPRESSION: Evidence of mild peripheral interstitial pulmonary edema.  Acute left seventh through 10th rib fractures.  OLGA SADLER M.D., RADIOLOGY RESIDENT

## 2020-01-05 NOTE — ED PROVIDER NOTE - NS ED ROS FT
CONST: no fevers, no chills  EYES: no pain, no vision changes  ENT: no sore throat, no ear pain, no change in hearing  CV: no chest pain, +leg swelling  RESP: +shortness of breath, +cough  ABD: no abdominal pain, no nausea, no vomiting, no diarrhea  : no dysuria, no flank pain, no hematuria  MSK: no back pain, no extremity pain  NEURO: no headache or additional neurologic complaints  HEME: no easy bleeding  SKIN:  no rash

## 2020-01-05 NOTE — CONSULT NOTE ADULT - SUBJECTIVE AND OBJECTIVE BOX
CHIEF COMPLAINT:Patient is a 89y old  Female who presents with a chief complaint of sob (05 Jan 2020 17:48)      HPI:   pt is well known to me.  89y Female complaining of difficulty breathing.	  5F pmhx of CHF, Afib, Pacemaker, HTN presents from nursing facility for SOB and course breath sounds. As per EMS she was on 2L NC, but O2 tank ran out. Patient AOx3, states that the cough and SOB started 3 days ago. Denies fevers, chills, N/V/D, chest pain.  pt was recently admitted with chf and was treated medically.  pt with sig valvular heart disease on medical therapy.      PAST MEDICAL & SURGICAL HISTORY:  Atrial fibrillation  Hypothyroid  HTN (hypertension)  DVT (deep venous thrombosis)  Cardiac pacemaker      MEDICATIONS  (STANDING):  bisacodyl 5 milliGRAM(s) Oral at bedtime  buMETAnide Injectable 1 milliGRAM(s) IV Push daily  diltiazem    milliGRAM(s) Oral daily  hydrALAZINE 25 milliGRAM(s) Oral two times a day  levothyroxine 88 MICROGram(s) Oral daily  senna 2 Tablet(s) Oral at bedtime  warfarin 3 milliGRAM(s) Oral once    MEDICATIONS  (PRN):      FAMILY HISTORY:      SOCIAL HISTORY:    [ ] Non-smoker  [ ] Smoker  [ ] Alcohol    Allergies    No Known Allergies    Intolerances    	    REVIEW OF SYSTEMS:  CONSTITUTIONAL: No fever, weight loss, or fatigue  EYES: No eye pain, visual disturbances, or discharge  ENT:  No difficulty hearing, tinnitus, vertigo; No sinus or throat pain  NECK: No pain or stiffness  RESPIRATORY: No cough, wheezing, chills or hemoptysis;+ Shortness of Breath  CARDIOVASCULAR: No chest pain, palpitations, passing out, dizziness, + leg swelling  GASTROINTESTINAL: No abdominal or epigastric pain. No nausea, vomiting, or hematemesis; No diarrhea or constipation. No melena or hematochezia.  GENITOURINARY: No dysuria, frequency, hematuria, or incontinence  NEUROLOGICAL: No headaches, memory loss, loss of strength, numbness, or tremors  SKIN: No itching, burning, rashes, or lesions   LYMPH Nodes: No enlarged glands  ENDOCRINE: No heat or cold intolerance; No hair loss  MUSCULOSKELETAL: No joint pain or swelling; No muscle, back, or extremity pain  PSYCHIATRIC: No depression, anxiety, mood swings, or difficulty sleeping  HEME/LYMPH: No easy bruising, or bleeding gums  ALLERGY AND IMMUNOLOGIC: No hives or eczema	    [ ] All others negative	  [ ] Unable to obtain    PHYSICAL EXAM:  T(C): 36.7 (01-05-20 @ 14:50), Max: 37.3 (01-05-20 @ 14:45)  HR: 85 (01-05-20 @ 16:47) (83 - 99)  BP: 123/72 (01-05-20 @ 16:47) (118/77 - 144/91)  RR: 22 (01-05-20 @ 16:47) (22 - 24)  SpO2: 99% (01-05-20 @ 16:47) (90% - 100%)  Wt(kg): --  I&O's Summary      Appearance: Normal	  HEENT:   Normal oral mucosa, PERRL, EOMI	  Lymphatic: No lymphadenopathy  Cardiovascular: Normal S1 S2, No JVD, +murmurs, No edema  Respiratorydecrease bs  Psychiatry: A & O x 3, Mood & affect appropriate  Gastrointestinal:  Soft, Non-tender, + BS	  Skin: No rashes, No ecchymoses, No cyanosis	  Neurologic: Non-focal  Extremities: Normal range of motion, No clubbing, cyanosis or edema  Vascular: Peripheral pulses palpable 2+ bilaterally    TELEMETRY: 	    ECG:  	  RADIOLOGY:  OTHER: 	  	  LABS:	 	    CARDIAC MARKERS:                              11.0   5.54  )-----------( 362      ( 05 Jan 2020 15:05 )             35.3     01-05    129<L>  |  93<L>  |  39<H>  ----------------------------<  235<H>  4.6   |  20<L>  |  1.27    Ca    9.6      05 Jan 2020 15:05  Mg     2.4     01-05    TPro  8.0  /  Alb  4.0  /  TBili  0.6  /  DBili  x   /  AST  25  /  ALT  20  /  AlkPhos  173<H>  01-05    proBNP: Serum Pro-Brain Natriuretic Peptide: 8930 pg/mL (01-05 @ 15:05)    Lipid Profile:   HgA1c:   TSH:   PT/INR - ( 05 Jan 2020 15:05 )   PT: 23.0 sec;   INR: 1.98 ratio         PTT - ( 05 Jan 2020 15:05 )  PTT:33.9 sec    PREVIOUS DIAGNOSTIC TESTING:      < from: 12 Lead ECG (12.19.19 @ 12:02) >  Diagnosis Line Ventricular-paced rhythm  Unclear underlying atrial rhythm  ABNORMAL ECG    < from: Transthoracic Echocardiogram (12.24.19 @ 09:49) >  1. Mild mitral regurgitation.  2. Calcified trileaflet aortic valve with decreased  opening. Peak transaortic valve gradient equals 34 mm Hg,  mean transaortic valve gradient equals 22 mm Hg, estimated  aortic valve area equals 1.3 sqcm (by continuity equation),  aortic valve velocity time integral equals 56 cm,  consistent with moderate to severe aortic stenosis  (severity may be underestimated). Mild aortic  regurgitation.  3. Severely dilated left atrium.  LA volume index = 75  cc/m2.  4. Increased relative wall thickness with normal left  ventricular mass index, consistent with concentric left  ventricular remodeling.  5. Normal left ventricular systolic function. No segmental  wall motion abnormalities. Septal flattening consistent  with right ventricular overload. Septal motion consistent  with conduction abnormality or pacing.  6. Increased E/e'  is consistent with elevated left  ventricular filling pressure.  7. Severe right atrial enlargement.  8. Right ventricular enlargement with decreased right  ventricular systolic function. A device wire is noted in  the right heart.  9. Malcoaptation of the tricuspid valve leaflets. Severe  tricuspid regurgitation.    < from: Xray Chest 1 View AP/PA (01.05.20 @ 15:26) >  Cardiomegaly. Clear lungs.    < end of copied text >

## 2020-01-05 NOTE — ED PROVIDER NOTE - CLINICAL SUMMARY MEDICAL DECISION MAKING FREE TEXT BOX
89F p/w Cough, SOB. No fevers, likely CHF exac vs. infection. Will get labs, vbg, CXR. BiPAP. Patient is DNR/DNI as per paperwork. Will admit.

## 2020-01-05 NOTE — ED ADULT NURSE NOTE - OBJECTIVE STATEMENT
89 y F arrived to the ED via EMS from nursing home c/o sob. EMS reports nursing home states patient woke up with SOB. Pt was brought here on 2L NC 90%. Pt placed on bipap. B/l leg edema noted upon assessment. Bilateral stridor heard on auscultation. Pt a&Ox3 at present, acute distress. Labored breathing noted. 89 y F arrived to the ED via EMS from nursing home c/o sob. EMS reports nursing home states patient woke up with SOB. Pt was brought here on nonrebreather mask O2 sat 90%, pt given two duoned as per EMS. Pt O2 sat 85% on RA Pt placed on bipap. B/l leg edema noted upon assessment. Bilateral stridor heard on auscultation. Pt a&Ox3 at present, acute distress. Labored breathing noted.

## 2020-01-05 NOTE — ED PROVIDER NOTE - PHYSICAL EXAMINATION
Tamra Little DO.:   GENERAL: Patient awake alert NAD.  HEENT: NC/AT, Moist mucous membranes, PERRL, EOMI.  LUNGS: Course breath sounds b/l diffuse, crackles and rhonchi  CARDIAC: Tachy, irregular, no m/r/g.    ABDOMEN: Soft, NT, ND, No rebound, guarding. No CVA tenderness.   EXT: 3+ pitting edema. No calf tenderness.  MSK: No spinal tenderness, no pain with movement, no deformities.  NEURO: A&Ox3. Moving all extremities.  SKIN: Warm and dry. No rash.  PSYCH: Normal affect.

## 2020-01-05 NOTE — ED PROVIDER NOTE - ATTENDING CONTRIBUTION TO CARE
89F pmhx of CHF, Afib, Pacemaker, HTN presents from nursing facility for worsening sob for a few days. Per EMS had course breath sounds, given 2 nebs en route and placed on 2L NC. Patient is aox3 and denies fever, chills, n/v, cp, abd pain.   AP - respiratory distress, will require CPAP. likely pulmonary edema. consider nitro if no improvement with cpap. lasix. clarify code status, patient with partial dnr/dni forms. need to clarify status. admission

## 2020-01-05 NOTE — H&P ADULT - NSHPPHYSICALEXAM_GEN_ALL_CORE
PHYSICAL EXAMINATION:  Vital Signs Last 24 Hrs  T(C): 36.7 (05 Jan 2020 14:50), Max: 37.3 (05 Jan 2020 14:45)  T(F): 98 (05 Jan 2020 14:50), Max: 99.1 (05 Jan 2020 14:45)  HR: 85 (05 Jan 2020 16:47) (83 - 99)  BP: 123/72 (05 Jan 2020 16:47) (118/77 - 144/91)  BP(mean): --  RR: 22 (05 Jan 2020 16:47) (22 - 24)  SpO2: 99% (05 Jan 2020 16:47) (90% - 100%)  CAPILLARY BLOOD GLUCOSE            GENERAL: NAD, well-groomed,  HEAD:  atraumatic, normocephalic  EYES: sclera anicteric  ENMT: mucous membranes moist  NECK: supple, No JVD  CHEST/LUNG:   coarse rhonchi,   HEART: normal S1, S2  ABDOMEN: BS+, soft, ND, NT   EXTREMITIES:      edema    b/l LEs  NEURO: awake, ,     moves all extremities  SKIN: no     rash

## 2020-01-05 NOTE — ED PROVIDER NOTE - OBJECTIVE STATEMENT
89F pmhx of CHF, Afib, Pacemaker, HTN presents from nursing facility for SOB and course breath sounds. As per EMS she was on 2L NC, but O2 tank ran out. Patient AOx3, states that the cough and SOB started 3 days ago. Denies fevers, chills, N/V/D, chest pain.

## 2020-01-05 NOTE — H&P ADULT - NSHPLABSRESULTS_GEN_ALL_CORE
LABS:                        11.0   5.54  )-----------( 362      ( 05 Jan 2020 15:05 )             35.3     01-05    129<L>  |  93<L>  |  39<H>  ----------------------------<  235<H>  4.6   |  20<L>  |  1.27    Ca    9.6      05 Jan 2020 15:05  Mg     2.4     01-05    TPro  8.0  /  Alb  4.0  /  TBili  0.6  /  DBili  x   /  AST  25  /  ALT  20  /  AlkPhos  173<H>  01-05    PT/INR - ( 05 Jan 2020 15:05 )   PT: 23.0 sec;   INR: 1.98 ratio         PTT - ( 05 Jan 2020 15:05 )  PTT:33.9 sec            01-05 @ 17:50  Performed In Lab  --      Thyroid Stimulating Hormone, Serum: 6.79 uIU/mL (12-20 @ 09:12)

## 2020-01-05 NOTE — ED PROVIDER NOTE - PROGRESS NOTE DETAILS
Wendy BLACKMON: patient feeling better on bipap. patient confirming she is DNR/DNI. will admit for CHF exac

## 2020-01-05 NOTE — ED ADULT NURSE NOTE - NSIMPLEMENTINTERV_GEN_ALL_ED
Implemented All Fall with Harm Risk Interventions:  [place] to call system. Call bell, personal items and telephone within reach. Instruct patient to call for assistance. Room bathroom lighting operational. Non-slip footwear when patient is off stretcher. Physically safe environment: no spills, clutter or unnecessary equipment. Stretcher in lowest position, wheels locked, appropriate side rails in place. Provide visual cue, wrist band, yellow gown, etc. Monitor gait and stability. Monitor for mental status changes and reorient to person, place, and time. Review medications for side effects contributing to fall risk. Reinforce activity limits and safety measures with patient and family. Provide visual clues: red socks.

## 2020-01-06 LAB
ANION GAP SERPL CALC-SCNC: 13 MMOL/L — SIGNIFICANT CHANGE UP (ref 5–17)
BUN SERPL-MCNC: 40 MG/DL — HIGH (ref 7–23)
CALCIUM SERPL-MCNC: 9.2 MG/DL — SIGNIFICANT CHANGE UP (ref 8.4–10.5)
CHLORIDE SERPL-SCNC: 97 MMOL/L — SIGNIFICANT CHANGE UP (ref 96–108)
CO2 SERPL-SCNC: 24 MMOL/L — SIGNIFICANT CHANGE UP (ref 22–31)
CREAT SERPL-MCNC: 1.3 MG/DL — SIGNIFICANT CHANGE UP (ref 0.5–1.3)
GLUCOSE SERPL-MCNC: 97 MG/DL — SIGNIFICANT CHANGE UP (ref 70–99)
HCT VFR BLD CALC: 31.9 % — LOW (ref 34.5–45)
HGB BLD-MCNC: 10.4 G/DL — LOW (ref 11.5–15.5)
INR BLD: 1.98 RATIO — HIGH (ref 0.88–1.16)
MCHC RBC-ENTMCNC: 22.8 PG — LOW (ref 27–34)
MCHC RBC-ENTMCNC: 32.6 GM/DL — SIGNIFICANT CHANGE UP (ref 32–36)
MCV RBC AUTO: 70 FL — LOW (ref 80–100)
PLATELET # BLD AUTO: 292 K/UL — SIGNIFICANT CHANGE UP (ref 150–400)
POTASSIUM SERPL-MCNC: 4 MMOL/L — SIGNIFICANT CHANGE UP (ref 3.5–5.3)
POTASSIUM SERPL-SCNC: 4 MMOL/L — SIGNIFICANT CHANGE UP (ref 3.5–5.3)
PROTHROM AB SERPL-ACNC: 23.2 SEC — HIGH (ref 10–12.9)
RBC # BLD: 4.56 M/UL — SIGNIFICANT CHANGE UP (ref 3.8–5.2)
RBC # FLD: 16.2 % — HIGH (ref 10.3–14.5)
SODIUM SERPL-SCNC: 134 MMOL/L — LOW (ref 135–145)
WBC # BLD: 13.18 K/UL — HIGH (ref 3.8–10.5)
WBC # FLD AUTO: 13.18 K/UL — HIGH (ref 3.8–10.5)

## 2020-01-06 PROCEDURE — 99232 SBSQ HOSP IP/OBS MODERATE 35: CPT

## 2020-01-06 RX ORDER — BUMETANIDE 0.25 MG/ML
0.5 INJECTION INTRAMUSCULAR; INTRAVENOUS ONCE
Refills: 0 | Status: COMPLETED | OUTPATIENT
Start: 2020-01-06 | End: 2020-01-06

## 2020-01-06 RX ORDER — ACETAMINOPHEN 500 MG
650 TABLET ORAL ONCE
Refills: 0 | Status: COMPLETED | OUTPATIENT
Start: 2020-01-06 | End: 2020-01-06

## 2020-01-06 RX ORDER — ACETAMINOPHEN 500 MG
1000 TABLET ORAL ONCE
Refills: 0 | Status: COMPLETED | OUTPATIENT
Start: 2020-01-06 | End: 2020-01-06

## 2020-01-06 RX ORDER — TRAMADOL HYDROCHLORIDE 50 MG/1
25 TABLET ORAL
Refills: 0 | Status: DISCONTINUED | OUTPATIENT
Start: 2020-01-06 | End: 2020-01-13

## 2020-01-06 RX ORDER — WARFARIN SODIUM 2.5 MG/1
3 TABLET ORAL ONCE
Refills: 0 | Status: COMPLETED | OUTPATIENT
Start: 2020-01-06 | End: 2020-01-06

## 2020-01-06 RX ORDER — ISOSORBIDE MONONITRATE 60 MG/1
30 TABLET, EXTENDED RELEASE ORAL DAILY
Refills: 0 | Status: DISCONTINUED | OUTPATIENT
Start: 2020-01-06 | End: 2020-01-13

## 2020-01-06 RX ADMIN — Medication 25 MILLIGRAM(S): at 20:30

## 2020-01-06 RX ADMIN — Medication 650 MILLIGRAM(S): at 06:17

## 2020-01-06 RX ADMIN — Medication 5 MILLIGRAM(S): at 21:16

## 2020-01-06 RX ADMIN — Medication 25 MILLIGRAM(S): at 06:03

## 2020-01-06 RX ADMIN — Medication 400 MILLIGRAM(S): at 09:57

## 2020-01-06 RX ADMIN — Medication 88 MICROGRAM(S): at 06:03

## 2020-01-06 RX ADMIN — WARFARIN SODIUM 3 MILLIGRAM(S): 2.5 TABLET ORAL at 21:17

## 2020-01-06 RX ADMIN — Medication 1000 MILLIGRAM(S): at 10:12

## 2020-01-06 RX ADMIN — TRAMADOL HYDROCHLORIDE 25 MILLIGRAM(S): 50 TABLET ORAL at 22:17

## 2020-01-06 RX ADMIN — Medication 650 MILLIGRAM(S): at 06:50

## 2020-01-06 RX ADMIN — Medication 240 MILLIGRAM(S): at 06:03

## 2020-01-06 RX ADMIN — BUMETANIDE 1 MILLIGRAM(S): 0.25 INJECTION INTRAMUSCULAR; INTRAVENOUS at 06:02

## 2020-01-06 RX ADMIN — BUMETANIDE 0.5 MILLIGRAM(S): 0.25 INJECTION INTRAMUSCULAR; INTRAVENOUS at 13:33

## 2020-01-06 RX ADMIN — ISOSORBIDE MONONITRATE 30 MILLIGRAM(S): 60 TABLET, EXTENDED RELEASE ORAL at 13:33

## 2020-01-06 RX ADMIN — TRAMADOL HYDROCHLORIDE 25 MILLIGRAM(S): 50 TABLET ORAL at 21:17

## 2020-01-06 NOTE — PROGRESS NOTE ADULT - ASSESSMENT
89 yo F      with   h/o      DVT.  A FIB on Coumadin,     SSX,  s/p   PPM .    HTN,  hypothyroid  old  menigioma on ct.        recne  admission on 12/ 19,  for,  from mlple rib fx's/  Left  7 to  10 th  ribs. 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 on Coumadin.  daily  inr  echo,  12/24/ 19,   mod  to severe   AS/  severe TR/  RV  dysfunction/ high LV filing pressure    on  cardizem/ hydralazine/bumex/  at  n home /  card  eval  iv  bumex  follow  hyponatremia/ coumadin per  inr/ pending       per  daughter/ pts  dr little  in [facility] / dr bonner,  [facility]  PT eval       < from: Transthoracic Echocardiogram (12.24.19 @ 09:49) >   Mild mitral regurgitation.  2. Calcified trileaflet aortic valve with decreased  opening. Peak transaortic valve gradient equals 34 mm Hg,  mean transaortic valve gradient equals 22 mm Hg, estimated  aortic valve area equals 1.3 sqcm (by continuity equation),  aortic valve velocity time integral equals 56 cm,  consistent with moderate to severe aortic stenosis  (severity may be underestimated). Mild aortic  regurgitation.  3. Severely dilated left atrium.  LA volume index = 75  cc/m2.  4. Increased relative wall thickness with normal left  ventricular mass index, consistent with concentric left  ventricular remodeling.  5. Normal left ventricular systolic function. No segmental  wall motion abnormalities. Septal flattening consistent  with right ventricular overload. Septal motion consistent  with conduction abnormality or pacing.  6. Increased E/e'  is consistent with elevated left  ventricular filling pressure.  7. Severe right atrial enlargement.  8. Right ventricular enlargement with decreased right  ventricular systolic function. A device wire is noted in  the right heart.  9. Malcoaptation of the tricuspid valve leaflets. Severe  tricuspid regurgitation.    < end of copied text >     < from: CT Head No Cont (12.19.19 @ 21:11) >  IMPRESSION: No acute intracranial hemorrhage.  Unchanged appearing right occipital convexity calcified/ossified meningioma.  Similar-appearing microvascular disease.  < end of copied text >     < from: CT Angio Abdomen and Pelvis w/ IV Cont (12.19.19 @ 17:30) >  BONES: Acute left seventh through 10th rib fractures. Age-indeterminate T6 compression fracture. Spinal degenerative changes. Mild retrolisthesis of L1 on L2 and mild anterolisthesis of L4 and L5.  IMPRESSION: Evidence of mild peripheral interstitial pulmonary edema.  Acute left seventh through 10th rib fractures.  OLGA SADLER M.D., RADIOLOGY RESIDENT 89 yo F      with   h/o      DVT.  A FIB on Coumadin,     SSX,  s/p   PPM .    HTN,  hypothyroid  old  menigioma on ct.        recne  admission on 12/ 19,  for,  from mlple rib fx's/  Left  7 to  10 th  ribs. 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 on Coumadin.  daily  inr  echo,  12/24/ 19,   mod  to severe   AS/  severe TR/  RV  dysfunction/ high LV filing pressure    on  cardizem/ hydralazine/bumex/  at  n home /  card  eval  iv  bumex  still with  tachypnea.  but less/  extra  diuretic    follow  hyponatremia/ coumadin per  inr/ pending       per  daughter/ pts  dr little  in [facility] / dr bonner,  [facility]  PT eval       < from: Transthoracic Echocardiogram (12.24.19 @ 09:49) >   Mild mitral regurgitation.  2. Calcified trileaflet aortic valve with decreased  opening. Peak transaortic valve gradient equals 34 mm Hg,  mean transaortic valve gradient equals 22 mm Hg, estimated  aortic valve area equals 1.3 sqcm (by continuity equation),  aortic valve velocity time integral equals 56 cm,  consistent with moderate to severe aortic stenosis  (severity may be underestimated). Mild aortic  regurgitation.  3. Severely dilated left atrium.  LA volume index = 75  cc/m2.  4. Increased relative wall thickness with normal left  ventricular mass index, consistent with concentric left  ventricular remodeling.  5. Normal left ventricular systolic function. No segmental  wall motion abnormalities. Septal flattening consistent  with right ventricular overload. Septal motion consistent  with conduction abnormality or pacing.  6. Increased E/e'  is consistent with elevated left  ventricular filling pressure.  7. Severe right atrial enlargement.  8. Right ventricular enlargement with decreased right  ventricular systolic function. A device wire is noted in  the right heart.  9. Malcoaptation of the tricuspid valve leaflets. Severe  tricuspid regurgitation.    < end of copied text >     < from: CT Head No Cont (12.19.19 @ 21:11) >  IMPRESSION: No acute intracranial hemorrhage.  Unchanged appearing right occipital convexity calcified/ossified meningioma.  Similar-appearing microvascular disease.  < end of copied text >     < from: CT Angio Abdomen and Pelvis w/ IV Cont (12.19.19 @ 17:30) >  BONES: Acute left seventh through 10th rib fractures. Age-indeterminate T6 compression fracture. Spinal degenerative changes. Mild retrolisthesis of L1 on L2 and mild anterolisthesis of L4 and L5.  IMPRESSION: Evidence of mild peripheral interstitial pulmonary edema.  Acute left seventh through 10th rib fractures.  OLGA SADLER M.D., RADIOLOGY RESIDENT

## 2020-01-06 NOTE — PROGRESS NOTE ADULT - SUBJECTIVE AND OBJECTIVE BOX
CARDIOLOGY     PROGRESS  NOTE   ________________________________________________    CHIEF COMPLAINT:Patient is a 89y old  Female who presents with a chief complaint of sob (06 Jan 2020 07:51)  doing better.  	  REVIEW OF SYSTEMS:  CONSTITUTIONAL: No fever, weight loss, or fatigue  EYES: No eye pain, visual disturbances, or discharge  ENT:  No difficulty hearing, tinnitus, vertigo; No sinus or throat pain  NECK: No pain or stiffness  RESPIRATORY: No cough, wheezing, chills or hemoptysis; + Shortness of Breath  CARDIOVASCULAR: No chest pain, palpitations, passing out, dizziness, or leg swelling  GASTROINTESTINAL: No abdominal or epigastric pain. No nausea, vomiting, or hematemesis; No diarrhea or constipation. No melena or hematochezia.  GENITOURINARY: No dysuria, frequency, hematuria, or incontinence  NEUROLOGICAL: No headaches, memory loss, loss of strength, numbness, or tremors  SKIN: No itching, burning, rashes, or lesions   LYMPH Nodes: No enlarged glands  ENDOCRINE: No heat or cold intolerance; No hair loss  MUSCULOSKELETAL: No joint pain or swelling; No muscle, back, or extremity pain  PSYCHIATRIC: No depression, anxiety, mood swings, or difficulty sleeping  HEME/LYMPH: No easy bruising, or bleeding gums  ALLERGY AND IMMUNOLOGIC: No hives or eczema	    [ ] All others negative	  [ ] Unable to obtain    PHYSICAL EXAM:  T(C): 36.8 (01-06-20 @ 05:48), Max: 37.3 (01-05-20 @ 14:45)  HR: 67 (01-06-20 @ 05:48) (67 - 99)  BP: 143/87 (01-06-20 @ 05:48) (118/77 - 144/91)  RR: 18 (01-06-20 @ 05:48) (18 - 24)  SpO2: 100% (01-06-20 @ 05:48) (90% - 100%)  Wt(kg): --  I&O's Summary      Appearance: Normal	  HEENT:   Normal oral mucosa, PERRL, EOMI	  Lymphatic: No lymphadenopathy  Cardiovascular: Normal S1 S2, No JVD, + murmurs, No edema  Respiratory: rales	  Psychiatry: A & O x 3, Mood & affect appropriate  Gastrointestinal:  Soft, Non-tender, + BS	  Skin: No rashes, No ecchymoses, No cyanosis	  Neurologic: Non-focal  Extremities: Normal range of motion, No clubbing, cyanosis or edema  Vascular: Peripheral pulses palpable 2+ bilaterally    MEDICATIONS  (STANDING):  bisacodyl 5 milliGRAM(s) Oral at bedtime  buMETAnide Injectable 1 milliGRAM(s) IV Push daily  diltiazem    milliGRAM(s) Oral daily  hydrALAZINE 25 milliGRAM(s) Oral two times a day  levothyroxine 88 MICROGram(s) Oral daily  senna 2 Tablet(s) Oral at bedtime      TELEMETRY: 	    ECG:  	  RADIOLOGY:  OTHER: 	  	  LABS:	 	    CARDIAC MARKERS:                                10.4   13.18 )-----------( 292      ( 06 Jan 2020 08:13 )             31.9     01-06    134<L>  |  97  |  40<H>  ----------------------------<  97  4.0   |  24  |  1.30    Ca    9.2      06 Jan 2020 07:23  Mg     2.4     01-05    TPro  8.0  /  Alb  4.0  /  TBili  0.6  /  DBili  x   /  AST  25  /  ALT  20  /  AlkPhos  173<H>  01-05    proBNP: Serum Pro-Brain Natriuretic Peptide: 8930 pg/mL (01-05 @ 15:05)  Serum Pro-Brain Natriuretic Peptide: 5543 pg/mL (12-19 @ 13:19)    Lipid Profile:   HgA1c:   TSH: Thyroid Stimulating Hormone, Serum: 6.79 uIU/mL (12-20 @ 09:12)    PT/INR - ( 05 Jan 2020 15:05 )   PT: 23.0 sec;   INR: 1.98 ratio         PTT - ( 05 Jan 2020 15:05 )  PTT:33.9 sec  < from: Xray Chest 1 View AP/PA (01.05.20 @ 15:26) >  Left-sided dual-lead cardiac pacemaker. The heart is enlarged. Aortic atherosclerosis.  No focal consolidations. No pleural effusions or pneumothorax.   The visualized osseous structures demonstrate no acute pathology.    IMPRESSION:  Cardiomegaly. Clear lungs.      < from: CT Angio Chest w/ IV Cont (12.19.19 @ 17:30) >    IMPRESSION: Evidence of mild peripheral interstitial pulmonary edema.    Acute left seventh through 10th rib fractures.    < end of copied text >      Assessment and plan  ---------------------------  89y Female complaining of difficulty breathing.	  5F pmhx of CHF, Afib, Pacemaker, HTN presents from nursing facility for SOB and course breath sounds. As per EMS she was on 2L NC, but O2 tank ran out. Patient AOx3, states that the cough and SOB started 3 days ago. Denies fevers, chills, N/V/D, chest pain.  pt was recently admitted with chf and was treated medically.  pt with sig valvular heart disease on medical therapy.  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  iv bumex  continue all cardiac meds  pt needs to wear her bipap at night in Rehab and home.  continue diuresis  add imdur 30 mg daily as tolerated

## 2020-01-07 LAB
ANION GAP SERPL CALC-SCNC: 10 MMOL/L — SIGNIFICANT CHANGE UP (ref 5–17)
BUN SERPL-MCNC: 46 MG/DL — HIGH (ref 7–23)
CALCIUM SERPL-MCNC: 9.3 MG/DL — SIGNIFICANT CHANGE UP (ref 8.4–10.5)
CHLORIDE SERPL-SCNC: 97 MMOL/L — SIGNIFICANT CHANGE UP (ref 96–108)
CO2 SERPL-SCNC: 27 MMOL/L — SIGNIFICANT CHANGE UP (ref 22–31)
CREAT SERPL-MCNC: 1.26 MG/DL — SIGNIFICANT CHANGE UP (ref 0.5–1.3)
GLUCOSE SERPL-MCNC: 91 MG/DL — SIGNIFICANT CHANGE UP (ref 70–99)
INR BLD: 1.73 RATIO — HIGH (ref 0.88–1.16)
POTASSIUM SERPL-MCNC: 4 MMOL/L — SIGNIFICANT CHANGE UP (ref 3.5–5.3)
POTASSIUM SERPL-SCNC: 4 MMOL/L — SIGNIFICANT CHANGE UP (ref 3.5–5.3)
PROTHROM AB SERPL-ACNC: 20.1 SEC — HIGH (ref 10–12.9)
SODIUM SERPL-SCNC: 134 MMOL/L — LOW (ref 135–145)

## 2020-01-07 PROCEDURE — 99232 SBSQ HOSP IP/OBS MODERATE 35: CPT

## 2020-01-07 RX ORDER — WARFARIN SODIUM 2.5 MG/1
3 TABLET ORAL ONCE
Refills: 0 | Status: COMPLETED | OUTPATIENT
Start: 2020-01-07 | End: 2020-01-07

## 2020-01-07 RX ORDER — CHLORHEXIDINE GLUCONATE 213 G/1000ML
1 SOLUTION TOPICAL DAILY
Refills: 0 | Status: DISCONTINUED | OUTPATIENT
Start: 2020-01-07 | End: 2020-01-16

## 2020-01-07 RX ORDER — IPRATROPIUM/ALBUTEROL SULFATE 18-103MCG
3 AEROSOL WITH ADAPTER (GRAM) INHALATION ONCE
Refills: 0 | Status: COMPLETED | OUTPATIENT
Start: 2020-01-07 | End: 2020-01-07

## 2020-01-07 RX ADMIN — Medication 5 MILLIGRAM(S): at 21:14

## 2020-01-07 RX ADMIN — Medication 88 MICROGRAM(S): at 06:10

## 2020-01-07 RX ADMIN — SENNA PLUS 2 TABLET(S): 8.6 TABLET ORAL at 21:14

## 2020-01-07 RX ADMIN — TRAMADOL HYDROCHLORIDE 25 MILLIGRAM(S): 50 TABLET ORAL at 08:50

## 2020-01-07 RX ADMIN — Medication 3 MILLILITER(S): at 09:00

## 2020-01-07 RX ADMIN — Medication 25 MILLIGRAM(S): at 06:10

## 2020-01-07 RX ADMIN — WARFARIN SODIUM 3 MILLIGRAM(S): 2.5 TABLET ORAL at 21:14

## 2020-01-07 RX ADMIN — CHLORHEXIDINE GLUCONATE 1 APPLICATION(S): 213 SOLUTION TOPICAL at 13:16

## 2020-01-07 RX ADMIN — BUMETANIDE 1 MILLIGRAM(S): 0.25 INJECTION INTRAMUSCULAR; INTRAVENOUS at 06:10

## 2020-01-07 RX ADMIN — TRAMADOL HYDROCHLORIDE 25 MILLIGRAM(S): 50 TABLET ORAL at 09:25

## 2020-01-07 RX ADMIN — ISOSORBIDE MONONITRATE 30 MILLIGRAM(S): 60 TABLET, EXTENDED RELEASE ORAL at 13:17

## 2020-01-07 RX ADMIN — Medication 240 MILLIGRAM(S): at 06:10

## 2020-01-07 RX ADMIN — Medication 25 MILLIGRAM(S): at 18:16

## 2020-01-07 NOTE — PROGRESS NOTE ADULT - SUBJECTIVE AND OBJECTIVE BOX
CARDIOLOGY     PROGRESS  NOTE   ________________________________________________    CHIEF COMPLAINT:Patient is a 89y old  Female who presents with a chief complaint of sob (07 Jan 2020 08:45)  still with sob.  	  REVIEW OF SYSTEMS:  CONSTITUTIONAL: No fever, weight loss, or fatigue  EYES: No eye pain, visual disturbances, or discharge  ENT:  No difficulty hearing, tinnitus, vertigo; No sinus or throat pain  NECK: No pain or stiffness  RESPIRATORY: No cough, wheezing, chills or hemoptysis; + Shortness of Breath  CARDIOVASCULAR: No chest pain, palpitations, passing out, dizziness, or leg swelling  GASTROINTESTINAL: No abdominal or epigastric pain. No nausea, vomiting, or hematemesis; No diarrhea or constipation. No melena or hematochezia.  GENITOURINARY: No dysuria, frequency, hematuria, or incontinence  NEUROLOGICAL: No headaches, memory loss, loss of strength, numbness, or tremors  SKIN: No itching, burning, rashes, or lesions   LYMPH Nodes: No enlarged glands  ENDOCRINE: No heat or cold intolerance; No hair loss  MUSCULOSKELETAL: No joint pain or swelling; No muscle, back, or extremity pain  PSYCHIATRIC: No depression, anxiety, mood swings, or difficulty sleeping  HEME/LYMPH: No easy bruising, or bleeding gums  ALLERGY AND IMMUNOLOGIC: No hives or eczema	    [ ] All others negative	  [ ] Unable to obtain    PHYSICAL EXAM:  T(C): 36.3 (01-06-20 @ 22:56), Max: 36.7 (01-06-20 @ 16:24)  HR: 63 (01-07-20 @ 06:07) (61 - 78)  BP: 127/85 (01-07-20 @ 06:07) (108/70 - 167/81)  RR: 18 (01-06-20 @ 22:56) (18 - 18)  SpO2: 94% (01-07-20 @ 08:30) (94% - 100%)  Wt(kg): --  I&O's Summary      Appearance: Normal	  HEENT:   Normal oral mucosa, PERRL, EOMI	  Lymphatic: No lymphadenopathy  Cardiovascular: Normal S1 S2, No JVD, + murmurs, No edema  Respiratory: decrease bs	  Psychiatry: A & O x 3, Mood & affect appropriate  Gastrointestinal:  Soft, Non-tender, + BS	  Skin: No rashes, No ecchymoses, No cyanosis	  Neurologic: Non-focal  Extremities: Normal range of motion, No clubbing, cyanosis or edema  Vascular: Peripheral pulses palpable 2+ bilaterally    MEDICATIONS  (STANDING):  albuterol/ipratropium for Nebulization. 3 milliLiter(s) Nebulizer once  bisacodyl 5 milliGRAM(s) Oral at bedtime  buMETAnide Injectable 1 milliGRAM(s) IV Push daily  diltiazem    milliGRAM(s) Oral daily  hydrALAZINE 25 milliGRAM(s) Oral two times a day  isosorbide   mononitrate ER Tablet (IMDUR) 30 milliGRAM(s) Oral daily  levothyroxine 88 MICROGram(s) Oral daily  senna 2 Tablet(s) Oral at bedtime      TELEMETRY: 	    ECG:  	  RADIOLOGY:  OTHER: 	  	  LABS:	 	    CARDIAC MARKERS:                                10.4   13.18 )-----------( 292      ( 06 Jan 2020 08:13 )             31.9     01-06    134<L>  |  97  |  40<H>  ----------------------------<  97  4.0   |  24  |  1.30    Ca    9.2      06 Jan 2020 07:23  Mg     2.4     01-05    TPro  8.0  /  Alb  4.0  /  TBili  0.6  /  DBili  x   /  AST  25  /  ALT  20  /  AlkPhos  173<H>  01-05    proBNP: Serum Pro-Brain Natriuretic Peptide: 8930 pg/mL (01-05 @ 15:05)  Serum Pro-Brain Natriuretic Peptide: 5543 pg/mL (12-19 @ 13:19)    Lipid Profile:   HgA1c:   TSH: Thyroid Stimulating Hormone, Serum: 6.79 uIU/mL (12-20 @ 09:12)    PT/INR - ( 06 Jan 2020 14:23 )   PT: 23.2 sec;   INR: 1.98 ratio         PTT - ( 05 Jan 2020 15:05 )  PTT:33.9 sec  < from: Xray Chest 1 View AP/PA (01.05.20 @ 15:26) >  Left-sided dual-lead cardiac pacemaker. The heart is enlarged. Aortic atherosclerosis.  No focal consolidations. No pleural effusions or pneumothorax.   The visualized osseous structures demonstrate no acute pathology.    IMPRESSION:  Cardiomegaly. Clear lungs.        Assessment and plan  ---------------------------  89y Female complaining of difficulty breathing.	  5F pmhx of CHF, Afib, Pacemaker, HTN presents from nursing facility for SOB and course breath sounds. As per EMS she was on 2L NC, but O2 tank ran out. Patient AOx3, states that the cough and SOB started 3 days ago. Denies fevers, chills, N/V/D, chest pain.  pt was recently admitted with chf and was treated medically.  pt with sig valvular heart disease on medical therapy.  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  iv bumex  continue all cardiac meds  pt needs to wear her bipap at night in Rehab and home.  continue diuresis  add imdur 30 mg daily as tolerated  still with sob check ct chest no contrast r/o aspiration

## 2020-01-07 NOTE — CHART NOTE - NSCHARTNOTEFT_GEN_A_CORE
Patient with CHF exacerbation, on bipap, in order to   have  continuos pulse ox monitoring, awaiting to  be transferred to [address] [place] [address].  Diana Murdock NP  [phone]

## 2020-01-07 NOTE — CHART NOTE - NSCHARTNOTEFT_GEN_A_CORE
Called by RN to see patient with acute sob around 9 am  Seen the patient, found to be in resp distress  Patient admitted yesterday with CHF exacerbation  and transferred to floor on Bipap. Bipap was removed  this morning for break fast, patient became very Tachypneic  after break fast. Sounded very gurgly.   On Exam: (+) rhonchi B/L.  1. Acute Resp distress         Duoneb x 1         Suction prn         To place back on bipap  Dr Alvarez made aware and agreed with the above  Diana Murdock NP  [phone]

## 2020-01-07 NOTE — PROGRESS NOTE ADULT - ASSESSMENT
89 yo F      with   h/o      DVT.  A FIB on Coumadin,     SSX,  s/p   PPM .    HTN,  hypothyroid  old  menigioma on ct.        recne  admission on 12/ 19,  for,  from mlple rib fx's/  Left  7 to  10 th  ribs. 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 on Coumadin.  daily  inr  echo,  12/24/ 19,   mod  to severe   AS/  severe TR/  RV  dysfunction/ high LV filing pressure    on  cardizem/ hydralazine/bumex/  at  n home /    iv  bumex/     still with  tachypnea.  but less/     hyponatremia/ sodium of  134/   AFIB,      coumadin per  inr/ pending       per  daughter/ pts  dr little  in [facility] / dr bonner,  [facility]  PT eval       < from: Transthoracic Echocardiogram (12.24.19 @ 09:49) >   Mild mitral regurgitation.  2. Calcified trileaflet aortic valve with decreased  opening. Peak transaortic valve gradient equals 34 mm Hg,  mean transaortic valve gradient equals 22 mm Hg, estimated  aortic valve area equals 1.3 sqcm (by continuity equation),  aortic valve velocity time integral equals 56 cm,  consistent with moderate to severe aortic stenosis  (severity may be underestimated). Mild aortic  regurgitation.  3. Severely dilated left atrium.  LA volume index = 75  cc/m2.  4. Increased relative wall thickness with normal left  ventricular mass index, consistent with concentric left  ventricular remodeling.  5. Normal left ventricular systolic function. No segmental  wall motion abnormalities. Septal flattening consistent  with right ventricular overload. Septal motion consistent  with conduction abnormality or pacing.  6. Increased E/e'  is consistent with elevated left  ventricular filling pressure.  7. Severe right atrial enlargement.  8. Right ventricular enlargement with decreased right  ventricular systolic function. A device wire is noted in  the right heart.  9. Malcoaptation of the tricuspid valve leaflets. Severe  tricuspid regurgitation.    < end of copied text >     < from: CT Head No Cont (12.19.19 @ 21:11) >  IMPRESSION: No acute intracranial hemorrhage.  Unchanged appearing right occipital convexity calcified/ossified meningioma.  Similar-appearing microvascular disease.  < end of copied text >     < from: CT Angio Abdomen and Pelvis w/ IV Cont (12.19.19 @ 17:30) >  BONES: Acute left seventh through 10th rib fractures. Age-indeterminate T6 compression fracture. Spinal degenerative changes. Mild retrolisthesis of L1 on L2 and mild anterolisthesis of L4 and L5.  IMPRESSION: Evidence of mild peripheral interstitial pulmonary edema.  Acute left seventh through 10th rib fractures.  OLGA SADLER M.D., RADIOLOGY RESIDENT 89 yo F      with   h/o      DVT.  A FIB on Coumadin,     SSX,  s/p   PPM .    HTN,  hypothyroid  old  menigioma on ct.        recne  admission on 12/ 19,  for,  from mlple rib fx's/  Left  7 to  10 th  ribs. 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 on Coumadin.  daily  inr  echo,  12/24/ 19,   mod  to severe   AS/  severe TR/  RV  dysfunction/ high LV filing pressure    on  cardizem/ hydralazine/bumex/  at  n home /    iv  bumex/     still with  tachypnea. , has impeoved    hyponatremia/ sodium of  134/   AFIB,      coumadin per  inr/ pending  tachypnea  earlier/  needs  bipap/ acute resp failure./  ctc hest   spoke with  daughter       per  daughter/ pts  dr little  in [facility] / dr bonner,  [facility]  PT eval       < from: Transthoracic Echocardiogram (12.24.19 @ 09:49) >   Mild mitral regurgitation.  2. Calcified trileaflet aortic valve with decreased  opening. Peak transaortic valve gradient equals 34 mm Hg,  mean transaortic valve gradient equals 22 mm Hg, estimated  aortic valve area equals 1.3 sqcm (by continuity equation),  aortic valve velocity time integral equals 56 cm,  consistent with moderate to severe aortic stenosis  (severity may be underestimated). Mild aortic  regurgitation.  3. Severely dilated left atrium.  LA volume index = 75  cc/m2.  4. Increased relative wall thickness with normal left  ventricular mass index, consistent with concentric left  ventricular remodeling.  5. Normal left ventricular systolic function. No segmental  wall motion abnormalities. Septal flattening consistent  with right ventricular overload. Septal motion consistent  with conduction abnormality or pacing.  6. Increased E/e'  is consistent with elevated left  ventricular filling pressure.  7. Severe right atrial enlargement.  8. Right ventricular enlargement with decreased right  ventricular systolic function. A device wire is noted in  the right heart.  9. Malcoaptation of the tricuspid valve leaflets. Severe  tricuspid regurgitation.    < end of copied text >     < from: CT Head No Cont (12.19.19 @ 21:11) >  IMPRESSION: No acute intracranial hemorrhage.  Unchanged appearing right occipital convexity calcified/ossified meningioma.  Similar-appearing microvascular disease.  < end of copied text >     < from: CT Angio Abdomen and Pelvis w/ IV Cont (12.19.19 @ 17:30) >  BONES: Acute left seventh through 10th rib fractures. Age-indeterminate T6 compression fracture. Spinal degenerative changes. Mild retrolisthesis of L1 on L2 and mild anterolisthesis of L4 and L5.  IMPRESSION: Evidence of mild peripheral interstitial pulmonary edema.  Acute left seventh through 10th rib fractures.  OLGA SADLER M.D., RADIOLOGY RESIDENT

## 2020-01-07 NOTE — PROGRESS NOTE ADULT - SUBJECTIVE AND OBJECTIVE BOX
less  sob    REVIEW OF SYSTEMS:  GEN: no fever,    no chills  RESP: no SOB,   no cough  CVS: no chest pain,   no palpitations  GI: no abdominal pain,   no nausea,   no vomiting,   no constipation,   no diarrhea  : no dysuria,   no frequency  NEURO: no headache,   no dizziness  PSYCH: no depression,   not anxious  Derm : no rash    MEDICATIONS  (STANDING):  bisacodyl 5 milliGRAM(s) Oral at bedtime  buMETAnide Injectable 1 milliGRAM(s) IV Push daily  diltiazem    milliGRAM(s) Oral daily  hydrALAZINE 25 milliGRAM(s) Oral two times a day  isosorbide   mononitrate ER Tablet (IMDUR) 30 milliGRAM(s) Oral daily  levothyroxine 88 MICROGram(s) Oral daily  senna 2 Tablet(s) Oral at bedtime    MEDICATIONS  (PRN):  traMADol 25 milliGRAM(s) Oral two times a day PRN Severe Pain (7 - 10)      Vital Signs Last 24 Hrs  T(C): 36.3 (06 Jan 2020 22:56), Max: 36.7 (06 Jan 2020 16:24)  T(F): 97.4 (06 Jan 2020 22:56), Max: 98 (06 Jan 2020 16:24)  HR: 63 (07 Jan 2020 06:07) (61 - 78)  BP: 127/85 (07 Jan 2020 06:07) (108/70 - 167/81)  BP(mean): --  RR: 18 (06 Jan 2020 22:56) (18 - 18)  SpO2: 98% (07 Jan 2020 06:07) (94% - 100%)  CAPILLARY BLOOD GLUCOSE        I&O's Summary      PHYSICAL EXAM:  HEAD:  Atraumatic, Normocephalic  NECK: Supple, No   JVD  CHEST/LUNG:   no     rales,     no,    rhonchi  HEART: Regular rate and rhythm;         murmur  ABDOMEN: Soft, Nontender, ;   EXTREMITIES:        edema  NEUROLOGY:  alert    LABS:                        10.4   13.18 )-----------( 292      ( 06 Jan 2020 08:13 )             31.9     01-06    134<L>  |  97  |  40<H>  ----------------------------<  97  4.0   |  24  |  1.30    Ca    9.2      06 Jan 2020 07:23  Mg     2.4     01-05    TPro  8.0  /  Alb  4.0  /  TBili  0.6  /  DBili  x   /  AST  25  /  ALT  20  /  AlkPhos  173<H>  01-05    PT/INR - ( 06 Jan 2020 14:23 )   PT: 23.2 sec;   INR: 1.98 ratio         PTT - ( 05 Jan 2020 15:05 )  PTT:33.9 sec            01-05 @ 17:50  4.1  45      Thyroid Stimulating Hormone, Serum: 6.79 uIU/mL (12-20 @ 09:12)          Consultant(s) Notes Reviewed:      Care Discussed with Consultants/Other Providers:

## 2020-01-08 DIAGNOSIS — J96.00 ACUTE RESPIRATORY FAILURE, UNSPECIFIED WHETHER WITH HYPOXIA OR HYPERCAPNIA: ICD-10-CM

## 2020-01-08 DIAGNOSIS — R53.2 FUNCTIONAL QUADRIPLEGIA: ICD-10-CM

## 2020-01-08 DIAGNOSIS — Z51.5 ENCOUNTER FOR PALLIATIVE CARE: ICD-10-CM

## 2020-01-08 DIAGNOSIS — R06.00 DYSPNEA, UNSPECIFIED: ICD-10-CM

## 2020-01-08 LAB
ANION GAP SERPL CALC-SCNC: 10 MMOL/L — SIGNIFICANT CHANGE UP (ref 5–17)
APTT BLD: 31.7 SEC — SIGNIFICANT CHANGE UP (ref 27.5–36.3)
BUN SERPL-MCNC: 47 MG/DL — HIGH (ref 7–23)
CALCIUM SERPL-MCNC: 9.1 MG/DL — SIGNIFICANT CHANGE UP (ref 8.4–10.5)
CHLORIDE SERPL-SCNC: 100 MMOL/L — SIGNIFICANT CHANGE UP (ref 96–108)
CO2 SERPL-SCNC: 28 MMOL/L — SIGNIFICANT CHANGE UP (ref 22–31)
CREAT SERPL-MCNC: 1.61 MG/DL — HIGH (ref 0.5–1.3)
GLUCOSE SERPL-MCNC: 84 MG/DL — SIGNIFICANT CHANGE UP (ref 70–99)
HCT VFR BLD CALC: 31.9 % — LOW (ref 34.5–45)
HGB BLD-MCNC: 10.3 G/DL — LOW (ref 11.5–15.5)
INR BLD: 1.53 RATIO — HIGH (ref 0.88–1.16)
MCHC RBC-ENTMCNC: 23.1 PG — LOW (ref 27–34)
MCHC RBC-ENTMCNC: 32.3 GM/DL — SIGNIFICANT CHANGE UP (ref 32–36)
MCV RBC AUTO: 71.7 FL — LOW (ref 80–100)
PLATELET # BLD AUTO: 296 K/UL — SIGNIFICANT CHANGE UP (ref 150–400)
POTASSIUM SERPL-MCNC: 4.5 MMOL/L — SIGNIFICANT CHANGE UP (ref 3.5–5.3)
POTASSIUM SERPL-SCNC: 4.5 MMOL/L — SIGNIFICANT CHANGE UP (ref 3.5–5.3)
PROTHROM AB SERPL-ACNC: 17.8 SEC — HIGH (ref 10–12.9)
RBC # BLD: 4.45 M/UL — SIGNIFICANT CHANGE UP (ref 3.8–5.2)
RBC # FLD: 16 % — HIGH (ref 10.3–14.5)
SODIUM SERPL-SCNC: 138 MMOL/L — SIGNIFICANT CHANGE UP (ref 135–145)
WBC # BLD: 9.7 K/UL — SIGNIFICANT CHANGE UP (ref 3.8–10.5)
WBC # FLD AUTO: 9.7 K/UL — SIGNIFICANT CHANGE UP (ref 3.8–10.5)

## 2020-01-08 PROCEDURE — 99223 1ST HOSP IP/OBS HIGH 75: CPT

## 2020-01-08 PROCEDURE — 71250 CT THORAX DX C-: CPT | Mod: 26

## 2020-01-08 PROCEDURE — 99232 SBSQ HOSP IP/OBS MODERATE 35: CPT

## 2020-01-08 PROCEDURE — 71045 X-RAY EXAM CHEST 1 VIEW: CPT | Mod: 26

## 2020-01-08 RX ORDER — WARFARIN SODIUM 2.5 MG/1
4 TABLET ORAL ONCE
Refills: 0 | Status: COMPLETED | OUTPATIENT
Start: 2020-01-08 | End: 2020-01-08

## 2020-01-08 RX ORDER — LEVALBUTEROL 1.25 MG/.5ML
0.63 SOLUTION, CONCENTRATE RESPIRATORY (INHALATION) EVERY 6 HOURS
Refills: 0 | Status: DISCONTINUED | OUTPATIENT
Start: 2020-01-08 | End: 2020-01-14

## 2020-01-08 RX ORDER — BUMETANIDE 0.25 MG/ML
1 INJECTION INTRAMUSCULAR; INTRAVENOUS
Refills: 0 | Status: DISCONTINUED | OUTPATIENT
Start: 2020-01-08 | End: 2020-01-09

## 2020-01-08 RX ORDER — BUMETANIDE 0.25 MG/ML
1 INJECTION INTRAMUSCULAR; INTRAVENOUS
Refills: 0 | Status: DISCONTINUED | OUTPATIENT
Start: 2020-01-08 | End: 2020-01-08

## 2020-01-08 RX ADMIN — Medication 25 MILLIGRAM(S): at 18:28

## 2020-01-08 RX ADMIN — TRAMADOL HYDROCHLORIDE 25 MILLIGRAM(S): 50 TABLET ORAL at 23:37

## 2020-01-08 RX ADMIN — WARFARIN SODIUM 4 MILLIGRAM(S): 2.5 TABLET ORAL at 22:29

## 2020-01-08 RX ADMIN — Medication 240 MILLIGRAM(S): at 06:08

## 2020-01-08 RX ADMIN — ISOSORBIDE MONONITRATE 30 MILLIGRAM(S): 60 TABLET, EXTENDED RELEASE ORAL at 13:36

## 2020-01-08 RX ADMIN — TRAMADOL HYDROCHLORIDE 25 MILLIGRAM(S): 50 TABLET ORAL at 07:02

## 2020-01-08 RX ADMIN — Medication 25 MILLIGRAM(S): at 06:08

## 2020-01-08 RX ADMIN — BUMETANIDE 1 MILLIGRAM(S): 0.25 INJECTION INTRAMUSCULAR; INTRAVENOUS at 18:27

## 2020-01-08 RX ADMIN — TRAMADOL HYDROCHLORIDE 25 MILLIGRAM(S): 50 TABLET ORAL at 08:00

## 2020-01-08 RX ADMIN — Medication 88 MICROGRAM(S): at 06:08

## 2020-01-08 RX ADMIN — TRAMADOL HYDROCHLORIDE 25 MILLIGRAM(S): 50 TABLET ORAL at 22:37

## 2020-01-08 RX ADMIN — CHLORHEXIDINE GLUCONATE 1 APPLICATION(S): 213 SOLUTION TOPICAL at 13:36

## 2020-01-08 RX ADMIN — BUMETANIDE 1 MILLIGRAM(S): 0.25 INJECTION INTRAMUSCULAR; INTRAVENOUS at 06:08

## 2020-01-08 RX ADMIN — LEVALBUTEROL 0.63 MILLIGRAM(S): 1.25 SOLUTION, CONCENTRATE RESPIRATORY (INHALATION) at 18:27

## 2020-01-08 NOTE — CONSULT NOTE ADULT - PROBLEM SELECTOR RECOMMENDATION 4
Called and l/m for Ramiro pts dtr to call back and schedule family meeting.  Spoke with pts dtr in law ( Toms wife) Jimmie is flying in from [place] tomorrow and will call to set up time for family meeting tomorrow when he arrives.    HCP on Chart listing children in the following order as HCP 1- Jimmie, 2- Soni,      3- Ramiro.

## 2020-01-08 NOTE — PROGRESS NOTE ADULT - SUBJECTIVE AND OBJECTIVE BOX
CARDIOLOGY     PROGRESS  NOTE   ________________________________________________    CHIEF COMPLAINT:Patient is a 89y old  Female who presents with a chief complaint of sob (08 Jan 2020 08:16)  doing  better.  	  REVIEW OF SYSTEMS:  CONSTITUTIONAL: No fever, weight loss, or fatigue  EYES: No eye pain, visual disturbances, or discharge  ENT:  No difficulty hearing, tinnitus, vertigo; No sinus or throat pain  NECK: No pain or stiffness  RESPIRATORY: No cough, wheezing, chills or hemoptysis; + Shortness of Breath  CARDIOVASCULAR: No chest pain, palpitations, passing out, dizziness, or leg swelling  GASTROINTESTINAL: No abdominal or epigastric pain. No nausea, vomiting, or hematemesis; No diarrhea or constipation. No melena or hematochezia.  GENITOURINARY: No dysuria, frequency, hematuria, or incontinence  NEUROLOGICAL: No headaches, memory loss, loss of strength, numbness, or tremors  SKIN: No itching, burning, rashes, or lesions   LYMPH Nodes: No enlarged glands  ENDOCRINE: No heat or cold intolerance; No hair loss  MUSCULOSKELETAL: No joint pain or swelling; No muscle, back, or extremity pain  PSYCHIATRIC: No depression, anxiety, mood swings, or difficulty sleeping  HEME/LYMPH: No easy bruising, or bleeding gums  ALLERGY AND IMMUNOLOGIC: No hives or eczema	    [ ] All others negative	  [ ] Unable to obtain    PHYSICAL EXAM:  T(C): 36.6 (01-08-20 @ 05:19), Max: 36.8 (01-07-20 @ 16:11)  HR: 74 (01-08-20 @ 05:57) (68 - 80)  BP: 120/76 (01-08-20 @ 05:19) (107/64 - 123/69)  RR: 18 (01-08-20 @ 05:19) (18 - 20)  SpO2: 99% (01-08-20 @ 05:57) (94% - 99%)  Wt(kg): --  I&O's Summary    07 Jan 2020 07:01  -  08 Jan 2020 07:00  --------------------------------------------------------  IN: 350 mL / OUT: 900 mL / NET: -550 mL        Appearance: Normal	  HEENT:   Normal oral mucosa, PERRL, EOMI	  Lymphatic: No lymphadenopathy  Cardiovascular: Normal S1 S2, No JVD, + murmurs, No edema  Respiratory: Lungs clear to auscultation	  Psychiatry: A & O x 3, Mood & affect appropriate  Gastrointestinal:  Soft, Non-tender, + BS	  Skin: No rashes, No ecchymoses, No cyanosis	  Neurologic: Non-focal  Extremities: Normal range of motion, No clubbing, cyanosis or edema  Vascular: Peripheral pulses palpable 2+ bilaterally    MEDICATIONS  (STANDING):  bisacodyl 5 milliGRAM(s) Oral at bedtime  buMETAnide Injectable 1 milliGRAM(s) IV Push daily  chlorhexidine 2% Cloths 1 Application(s) Topical daily  diltiazem    milliGRAM(s) Oral daily  hydrALAZINE 25 milliGRAM(s) Oral two times a day  isosorbide   mononitrate ER Tablet (IMDUR) 30 milliGRAM(s) Oral daily  levothyroxine 88 MICROGram(s) Oral daily  senna 2 Tablet(s) Oral at bedtime      TELEMETRY: 	    ECG:  	  RADIOLOGY:  OTHER: 	  	  LABS:	 	    CARDIAC MARKERS:            01-08    138  |  100  |  47<H>  ----------------------------<  84  4.5   |  28  |  1.61<H>    Ca    9.1      08 Jan 2020 06:18      proBNP: Serum Pro-Brain Natriuretic Peptide: 8930 pg/mL (01-05 @ 15:05)  Serum Pro-Brain Natriuretic Peptide: 5543 pg/mL (12-19 @ 13:19)    Lipid Profile:   HgA1c:   TSH: Thyroid Stimulating Hormone, Serum: 6.79 uIU/mL (12-20 @ 09:12)    PT/INR - ( 07 Jan 2020 13:02 )   PT: 20.1 sec;   INR: 1.73 ratio      < from: Xray Chest 1 View AP/PA (01.05.20 @ 15:26) >  Left-sided dual-lead cardiac pacemaker. The heart is enlarged. Aortic atherosclerosis.  No focal consolidations. No pleural effusions or pneumothorax.   The visualized osseous structures demonstrate no acute pathology.    IMPRESSION:  Cardiomegaly. Clear lungs.               Assessment and plan  ---------------------------  89y Female complaining of difficulty breathing.	  5F pmhx of CHF, Afib, Pacemaker, HTN presents from nursing facility for SOB and course breath sounds. As per EMS she was on 2L NC, but O2 tank ran out. Patient AOx3, states that the cough and SOB started 3 days ago. Denies fevers, chills, N/V/D, chest pain.  pt was recently admitted with chf and was treated medically.  pt with sig valvular heart disease on medical therapy.  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  iv bumex  continue all cardiac meds  pt needs to wear her bipap at night in Rehab and home.  continue diuresis  add imdur 30 mg daily as tolerated  still with sob check ct chest no contrast r/o aspiration  will increase bumex to bid if increasing sob , for now great UO

## 2020-01-08 NOTE — CONSULT NOTE ADULT - SUBJECTIVE AND OBJECTIVE BOX
[facility] - Division of Pulmonary, Critical Care and Sleep Medicine   Please call [phone] between 8am-pm weekdays, [phone] after hours and weekends      CHIEF COMPLAINT:    HPI:    PAST MEDICAL & SURGICAL HISTORY:  Atrial fibrillation  Hypothyroid  HTN (hypertension)  DVT (deep venous thrombosis)  Cardiac pacemaker      FAMILY HISTORY:      SOCIAL HISTORY:  Smoking: [ ] Never Smoked [ ] Former Smoker (__ packs x ___ years) [ ] Current Smoker  (__ packs x ___ years)  Substance Use: [ ] Never Used [ ] Used ____  EtOH Use:  Marital Status: [ ] Single [ ]  [ ]  [ ]   Occupation:  Recent Travel:  Country of Birth:  Advance Directives:    Allergies    No Known Allergies    Intolerances        HOME MEDICATIONS:    REVIEW OF SYSTEMS:  Constitutional: [ ] fevers [ ] chills [ ] weight loss [ ] weight gain  HEENT: [ ] dry eyes [ ] eye irritation [ ] postnasal drip [ ] nasal congestion  CV: [ ] chest pain [ ] orthopnea [ ] palpitations [ ] murmur  Resp: [ ] cough [ ] shortness of breath [ ] wheezing [ ] sputum [ ] hemoptysis  GI: [ ] nausea [ ] vomiting [ ] diarrhea [ ] constipation [ ] abd pain [ ] dysphagia   : [ ] dysuria [ ] nocturia [ ] hematuria [ ] increased urinary frequency  Musculoskeletal: [ ] myalgias [ ] arthralgias   Skin: [ ] rash [ ] itch  Neurological: [ ] headache [ ] dizziness [ ] syncope [ ] weakness [ ] numbness  Psychiatric: [ ] anxiety [ ] depression  Endocrine: [ ] diabetes [ ] thyroid problem  Hematologic/Lymphatic: [ ] anemia [ ] bleeding problem  Allergic/Immunologic: [ ] itchy eyes [ ] nasal discharge [ ] hives [ ] angioedema  [ ] All other systems negative  [ ] Unable to assess ROS because ________    OBJECTIVE:  ICU Vital Signs Last 24 Hrs  T(C): 36.6 (08 Jan 2020 13:17), Max: 36.8 (07 Jan 2020 16:11)  T(F): 97.8 (08 Jan 2020 13:17), Max: 98.3 (07 Jan 2020 16:11)  HR: 65 (08 Jan 2020 13:17) (65 - 87)  BP: 124/75 (08 Jan 2020 13:17) (107/64 - 124/75)  BP(mean): --  ABP: --  ABP(mean): --  RR: 18 (08 Jan 2020 13:17) (18 - 20)  SpO2: 98% (08 Jan 2020 13:17) (94% - 99%)        01-07 @ 07:01  -  01-08 @ 07:00  --------------------------------------------------------  IN: 350 mL / OUT: 900 mL / NET: -550 mL    01-08 @ 07:01 - 01-08 @ 14:08  --------------------------------------------------------  IN: 120 mL / OUT: 400 mL / NET: -280 mL      CAPILLARY BLOOD GLUCOSE          PHYSICAL EXAM:  General:  NAD  HEENT:  NC/AT  Lymph Nodes: No cervical or supraclavicular lymphadenopathy   Neck: Supple  Respiratory:  CTA B/L, no wheezes, crackles or rhonchi  Cardiovascular:  RRR, no m/r/g  Abdomen: soft, NT/ND, +BS  Extremities: no clubbing, cyanosis or edema, warm  Skin: no rash  Neurological: AAOx3, no focal deficits  Psychiatry: not anxious, normal affect    HOSPITAL MEDICATIONS:  MEDICATIONS  (STANDING):  bisacodyl 5 milliGRAM(s) Oral at bedtime  buMETAnide Injectable 1 milliGRAM(s) IV Push two times a day  chlorhexidine 2% Cloths 1 Application(s) Topical daily  diltiazem    milliGRAM(s) Oral daily  hydrALAZINE 25 milliGRAM(s) Oral two times a day  isosorbide   mononitrate ER Tablet (IMDUR) 30 milliGRAM(s) Oral daily  levothyroxine 88 MICROGram(s) Oral daily  senna 2 Tablet(s) Oral at bedtime    MEDICATIONS  (PRN):  traMADol 25 milliGRAM(s) Oral two times a day PRN Severe Pain (7 - 10)      LABS:                        10.3   9.70  )-----------( 296      ( 08 Jan 2020 08:53 )             31.9     Hgb Trend: 10.3<--, 10.4<--, 11.0<--  01-08    138  |  100  |  47<H>  ----------------------------<  84  4.5   |  28  |  1.61<H>    Ca    9.1      08 Jan 2020 06:18      Creatinine Trend: 1.61<--, 1.26<--, 1.30<--, 1.27<--, 1.40<--, 1.52<--  PT/INR - ( 07 Jan 2020 13:02 )   PT: 20.1 sec;   INR: 1.73 ratio                   MICROBIOLOGY:     RADIOLOGY:  [ ] Reviewed and interpreted by me    PULMONARY FUNCTION TESTS:    EKG: [facility] - Division of Pulmonary, Critical Care and Sleep Medicine   Please call [phone] between 8am-pm weekdays, [phone] after hours and weekends      CHIEF COMPLAINT: Cough and shortness of breathx3 days    HPI: 89 yo F with a h/o HFpEF, Afib s/p PPM, severe AS and TR, e/o RV overload on TTE   HTN, EMILY diagnosed years ago (not on therapy), recent hospitalization for fractured ribs after coughing presents with from nursing facility with worsening SOB x 3days. Patient's two daughters and son-in law at bedside. State that prior to her admission in December was very active, without significant pulmonary history. Was coughing and fractured her left 7-10 ribs (has osteoporosis), admitted to the hospital  19, discharged to rehab. Now with progressive dyspnea, LE edema, proBNP of 5543 to 8930, TTe with severe AS and TR. Being diuresed, BP controlled. On , experienced increased work of breathing and placed on bilevel . Bumex increased to 1 mg IV BID.   Currently seen eating lunch, sitting up in bed, dyspneic. +cough, no sputum production, denies chest pain. 98% on 4 LNC    PAST MEDICAL & SURGICAL HISTORY:  Atrial fibrillation  Hypothyroid  HTN (hypertension)  DVT (deep venous thrombosis)  Cardiac pacemaker      FAMILY HISTORY: NC      SOCIAL HISTORY:  Smoking: [ x] Never Smoked [ ] Former Smoker (__ packs x ___ years) [ ] Current Smoker  (__ packs x ___ years)  Substance Use: [ x] Never Used [ ] Used ____  EtOH Use: denies    Allergies  No Known Allergies    HOME MEDICATIONS: reviewed in H&P    REVIEW OF SYSTEMS:  Constitutional: [ ] fevers [ ] chills [ ] weight loss [ ] weight gain  HEENT: [ ] dry eyes [ ] eye irritation [ ] postnasal drip [ ] nasal congestion  CV: [ ] chest pain [ ] orthopnea [ ] palpitations [ ] murmur  Resp: [x ] cough [x ] shortness of breath [ ] wheezing [ ] sputum [ ] hemoptysis  GI: [ ] nausea [ ] vomiting [ ] diarrhea [ ] constipation [ ] abd pain [ ] dysphagia   : [ ] dysuria [ ] nocturia [ ] hematuria [ ] increased urinary frequency  Musculoskeletal: [ ] myalgias [ ] arthralgias   Skin: [ ] rash [ ] itch  Neurological: [ ] headache [ ] dizziness [ ] syncope [ ] weakness [ ] numbness  Psychiatric: [ ] anxiety [ ] depression  Endocrine: [ ] diabetes [ ] thyroid problem  Hematologic/Lymphatic: [ ] anemia [ ] bleeding problem  Allergic/Immunologic: [ ] itchy eyes [ ] nasal discharge [ ] hives [ ] angioedema  [x ] All other systems negative  [ ] Unable to assess ROS because ________    OBJECTIVE:  ICU Vital Signs Last 24 Hrs  T(C): 36.6 (2020 13:17), Max: 36.8 (2020 16:11)  T(F): 97.8 (2020 13:17), Max: 98.3 (2020 16:11)  HR: 65 (2020 13:17) (65 - 87)  BP: 124/75 (2020 13:17) (107/64 - 124/75)  BP(mean): --  ABP: --  ABP(mean): --  RR: 18 (2020 13:17) (18 - 20)  SpO2: 98% (2020 13:17) (94% - 99%)         @ 07:08 @ 07:00  --------------------------------------------------------  IN: 350 mL / OUT: 900 mL / NET: -550 mL     @ 07: @ 14:08  --------------------------------------------------------  IN: 120 mL / OUT: 400 mL / NET: -280 mL      CAPILLARY BLOOD GLUCOSE          PHYSICAL EXAM:  General:  moderate distress, tachypneic while eating and talking  HEENT:  NC/AT  Neck: Supple  Respiratory:  +wheezes and rhonchi  Cardiovascular:  RRR, no m/r/g  Abdomen: soft, NT/ND, +BS  Extremities: trace bipedal edema, warm  Skin: no rash  Neurological: AAOx3, no focal deficits    HOSPITAL MEDICATIONS:  MEDICATIONS  (STANDING):  bisacodyl 5 milliGRAM(s) Oral at bedtime  buMETAnide Injectable 1 milliGRAM(s) IV Push two times a day  chlorhexidine 2% Cloths 1 Application(s) Topical daily  diltiazem    milliGRAM(s) Oral daily  hydrALAZINE 25 milliGRAM(s) Oral two times a day  isosorbide   mononitrate ER Tablet (IMDUR) 30 milliGRAM(s) Oral daily  levothyroxine 88 MICROGram(s) Oral daily  senna 2 Tablet(s) Oral at bedtime    MEDICATIONS  (PRN):  traMADol 25 milliGRAM(s) Oral two times a day PRN Severe Pain (7 - 10)      LABS:                        10.3   9.70  )-----------( 296      ( 2020 08:53 )             31.9     Hgb Trend: 10.3<--, 10.4<--, 11.0<--  01-08    138  |  100  |  47<H>  ----------------------------<  84  4.5   |  28  |  1.61<H>    Ca    9.1      2020 06:18      Creatinine Trend: 1.61<--, 1.26<--, 1.30<--, 1.27<--, 1.40<--, 1.52<--  PT/INR - ( 2020 13:02 )   PT: 20.1 sec;   INR: 1.73 ratio                   MICROBIOLOGY:     RADIOLOGY:  [x ] Reviewed and interpreted by me  < from: Xray Chest 1 View AP/PA (20 @ 15:26) >  EXAM:  XR CHEST AP OR PA 1V                            PROCEDURE DATE:  2020            INTERPRETATION:  CLINICAL INFORMATION: Chest pain.    EXAM: AP chest radiograph.    COMPARISON: Chest radiograph from on 2019.    FINDINGS:  Left-sided dual-lead cardiac pacemaker. The heart is enlarged. Aortic atherosclerosis.  No focal consolidations. No pleural effusions or pneumothorax.   The visualized osseous structures demonstrate no acute pathology.    IMPRESSION:  Cardiomegaly. Clear lungs.  ANA MARIA AGUILAR M.D., RADIOLOGY RESIDENT  This document has been electronically signed.  MARIUSZ ARGUETA M.D., ATTENDING RADIOLOGIST  This document has been electronically signed. 2020  3:35PM    < end of copied text >    < from: CT Angio Chest w/ IV Cont (19 @ 17:30) >  EXAM:  CT ANGIO CHEST (W)AW IC                          EXAM:  CT ANGIO ABD PELV (W)AW IC                            PROCEDURE DATE:  2019            INTERPRETATION:  CLINICAL INFORMATION: Chest pain    COMPARISON: CT abdomen pelvis nd chest radiograph 2019    PROCEDURE:   CT Angiography of the Chest, Abdomen and Pelvis.   Gated precontrast imaging was performed through the heart followed by gated CT Angiography of the heart with subsequent non-gated arterial phase imaging of the chest, abdomen and pelvis.  Intravenous contrast: 90 ml Omnipaque 350. 10 ml discarded.  Oral contrast: None.  Sagittal and coronal reformats were performed as well as 3D (MIP) reconstructions.    FINDINGS:    CHEST:     LUNGS AND LARGE AIRWAYS: Patent central airways. Biapical scarring and bibasilar linear atelectasis. No pneumonia. No pneumothorax. Interlobular septal thickening noted peripherally of the lungs.  PLEURA: Trace bilateral pleural effusions.  VESSELS: No aortic dissection, penetrating aortic ulcer, or intramural hematoma. Calcified atherosclerotic tortuous thoracic aorta ascending aorta measures 4 cm. Main pulmonary artery measures 3.5 cm.  HEART: Severe right atrial enlargement. No pericardial effusion. Pacemaker.  MEDIASTINUM AND RAMON: No lymphadenopathy.  CHEST WALL AND LOWER NECK: Left chest wall dual-chamber pacemaker.    ABDOMEN AND PELVIS:    LIVER: Subcentimeter low-attenuation lesion in the left hepatic lobe, too small to characterize. Mildly prominent leftlobe.  BILE DUCTS: Normal caliber.  GALLBLADDER: Suggestion of fine sludge.  SPLEEN: Within normal limits.  PANCREAS: Within normal limits.  ADRENALS: Within normal limits.  KIDNEYS/URETERS: Multiple hypodensities within the kidneys some of which represent cysts. Some cannot be characterized    BLADDER: Within normal limits.  REPRODUCTIVE ORGANS: Uterus and adnexa within normal limits.    BOWEL: No bowel obstruction. Colonic diverticulosis without diverticulitis. Small hiatal hernia. Stool in thecolon.  PERITONEUM: No ascites.  VESSELS: Ectatic tortuous abdominal aorta with Atherosclerotic change.  RETROPERITONEUM/LYMPH NODES: No lymphadenopathy.    ABDOMINAL WALL: Within normal limits.  BONES: Acute left seventh through 10th rib fractures. Age-indeterminate T6 compression fracture. Spinal degenerative changes. Mild retrolisthesis of L1 on L2 and mild anterolisthesis of L4 and L5.    IMPRESSION: Evidence of mild peripheral interstitial pulmonary edema.    Acute left seventh through 10th rib fractures.    OLGA SADLER M.D., RADIOLOGY RESIDENT  This document has been electronically signed.  GIOVANNY WOLFE M.D., ATTENDING RADIOLOGIST  This document has been electronically signed. Dec 19 2019  6:33PM        < end of copied text >    < from: Transthoracic Echocardiogram (19 @ 09:49) >  PROCEDURE: Transthoracic echocardiogram with 2-D, M-Mode  and complete spectral and color flow Doppler.  INDICATION: Dyspnea, unspecified (R06.00)  ------------------------------------------------------------------------  Dimensions:    Normal Values:  LA:     3.8    2.0 - 4.0 cm  Ao:     3.4    2.0 - 3.8 cm  SEPTUM: 1.2    0.6 - 1.2 cm  PWT:    1.1    0.6 - 1.1 cm  LVIDd:  4.4    3.0 - 5.6 cm  LVIDs:  2.7    1.8 - 4.0 cm  Derived variables:  LVMI: 108 g/m2  RWT: 0.50  Fractional short: 39 %  EF (Visual Estimate): 55 %  Doppler Peak Velocity (m/sec): AoV=2.9  ------------------------------------------------------------------------  Observations:  Mitral Valve: Mitral annular calcification. Mitral valve  prolapse involving the posterior mitral leaflet. Mild  mitral regurgitation.  Aortic Valve/Aorta: Calcified trileaflet aortic valve with  decreased opening. Peak transaortic valve gradient equals  34 mm Hg, mean transaortic valve gradient equals 22 mm Hg,  estimated aortic valve area equals 1.3 sqcm (by continuity  equation), aortic valve velocity time integral equals 56  cm, consistent with moderateto severe aortic stenosis  (severity may be underestimated). Mild aortic  regurgitation.  Peak left ventricular outflow tract  gradient equals 6 mm Hg, mean gradient is equal to 4 mm Hg,  LVOT velocity time integral equals 23 cm.  Aortic Root: 3.4 cm.  LVOT diameter: 2 cm.  Left Atrium: Severely dilated left atrium.  LA volume index  = 75 cc/m2.  Left Ventricle: Normal left ventricular systolic function.  No segmental wall motion abnormalities. Septal flattening  consistent with right ventricularoverload. Septal motion  consistent with conduction abnormality or pacing.  Increased relative wall thickness with normal left  ventricular mass index, consistent with concentric left  ventricular remodeling. Increased E/e'  is consistent with  elevated left ventricular filling pressure.  Right Heart: Severe right atrial enlargement. Right  ventricular enlargement with decreased right ventricular  systolic function. A device wire is noted in the right  heart. Malcoaptation of the tricuspid valve leaflets.  Severe tricuspid regurgitation. Normal pulmonic valve.  Pericardium/Pleura: Normal pericardium with no pericardial  effusion.  Right pleural effusion.  Hemodynamic: Estimated right atrial pressure is 8 mm Hg.  Estimated right ventricular systolic pressure equals 31 mm  Hg, assuming right atrial pressure equals 8 mm Hg,  consistent with normal pulmonary pressures.  ------------------------------------------------------------------------  Conclusions:  1. Mild mitral regurgitation.  2. Calcified trileaflet aortic valve with decreased  opening. Peak transaortic valve gradient equals 34 mm Hg,  mean transaortic valve gradient equals 22 mm Hg, estimated  aortic valve area equals 1.3 sqcm (by continuity equation),  aortic valve velocity time integral equals 56 cm,  consistent with moderate to severe aortic stenosis  (severity may be underestimated). Mild aortic  regurgitation.  3. Severely dilated left atrium.  LA volume index = 75  cc/m2.  4. Increased relative wall thickness with normal left  ventricular mass index, consistent with concentric left  ventricular remodeling.  5. Normal left ventricular systolic function. No segmental  wall motion abnormalities. Septal flattening consistent  with right ventricular overload. Septal motion consistent  with conduction abnormality or pacing.  6. Increased E/e'  is consistent with elevated left  ventricular filling pressure.  7. Severe right atrial enlargement.  8. Right ventricular enlargement with decreased right  ventricular systolic function. A device wire is noted in  the right heart.  9. Malcoaptation of the tricuspid valve leaflets. Severe  tricuspid regurgitation.  ------------------------------------------------------------------------  Confirmed on  2019 - 17:36:37 by Hugo Anaya M.D.  ------------------------------------------------------------------------    < end of copied text >      PULMONARY FUNCTION TESTS: none available    EK20 - Afib with RVR at 102, Vpaced

## 2020-01-08 NOTE — PROGRESS NOTE ADULT - SUBJECTIVE AND OBJECTIVE BOX
ha s sob, but  less  REVIEW OF SYSTEMS:  GEN: no fever,    no chills  RESP: no SOB,   no cough  CVS: no chest pain,   no palpitations  GI: no abdominal pain,   no nausea,   no vomiting,   no constipation,   no diarrhea  : no dysuria,   no frequency  NEURO: no headache,   no dizziness  PSYCH: no depression,   not anxious  Derm : no rash    MEDICATIONS  (STANDING):  bisacodyl 5 milliGRAM(s) Oral at bedtime  buMETAnide Injectable 1 milliGRAM(s) IV Push daily  chlorhexidine 2% Cloths 1 Application(s) Topical daily  diltiazem    milliGRAM(s) Oral daily  hydrALAZINE 25 milliGRAM(s) Oral two times a day  isosorbide   mononitrate ER Tablet (IMDUR) 30 milliGRAM(s) Oral daily  levothyroxine 88 MICROGram(s) Oral daily  senna 2 Tablet(s) Oral at bedtime    MEDICATIONS  (PRN):  traMADol 25 milliGRAM(s) Oral two times a day PRN Severe Pain (7 - 10)      Vital Signs Last 24 Hrs  T(C): 36.6 (08 Jan 2020 05:19), Max: 36.8 (07 Jan 2020 16:11)  T(F): 97.8 (08 Jan 2020 05:19), Max: 98.3 (07 Jan 2020 16:11)  HR: 74 (08 Jan 2020 05:57) (68 - 80)  BP: 120/76 (08 Jan 2020 05:19) (107/64 - 123/69)  BP(mean): --  RR: 18 (08 Jan 2020 05:19) (18 - 20)  SpO2: 99% (08 Jan 2020 05:57) (94% - 99%)  CAPILLARY BLOOD GLUCOSE        I&O's Summary    07 Jan 2020 07:01  -  08 Jan 2020 07:00  --------------------------------------------------------  IN: 350 mL / OUT: 900 mL / NET: -550 mL        PHYSICAL EXAM:  HEAD:  Atraumatic, Normocephalic  NECK: Supple, No   JVD  CHEST/LUNG:   no     rales,     no,    rhonchi  HEART: Regular rate and rhythm;         murmur  ABDOMEN: Soft, Nmild   edema  NEUROLOGY:  alert    LABS:    01-08    138  |  100  |  47<H>  ----------------------------<  84  4.5   |  28  |  1.61<H>    Ca    9.1      08 Jan 2020 06:18      PT/INR - ( 07 Jan 2020 13:02 )   PT: 20.1 sec;   INR: 1.73 ratio                         Thyroid Stimulating Hormone, Serum: 6.79 uIU/mL (12-20 @ 09:12)          Consultant(s) Notes Reviewed:      Care Discussed with Consultants/Other Providers:

## 2020-01-08 NOTE — PHYSICAL THERAPY INITIAL EVALUATION ADULT - GENERAL OBSERVATIONS, REHAB EVAL
Pt received supine in bed, +family present (dtr), +cont pulse ox, +primafit, +Bi-pap transferred to 4L o2 via NC (By RACHEL Adams), spo2 94%, A&Ox3, agreeable to physical therapy precious [facility]icketts 55 min.

## 2020-01-08 NOTE — PHYSICAL THERAPY INITIAL EVALUATION ADULT - ADDITIONAL COMMENTS
PTA, per daughter pt was at STR at [facility]. PTA, per daughter pt was at [facility] at [facility]. Prior to rehab, pt resided in house, alone, 4 steps to enter, 1 flight to bedroom. Pt was independent of all ADL's and functional mobility. Pt used RW in community only. (+) driving. Pt owns RW and shower chair.

## 2020-01-08 NOTE — PHYSICAL THERAPY INITIAL EVALUATION ADULT - PERTINENT HX OF CURRENT PROBLEM, REHAB EVAL
89 y/o F, PMHx of Afib, hypothyroidism, htn, DVT, sss s/p ppm. Pt admitted for CHF w/ exac.  Pt with recent admission on 12/ 19,  for,  from mlple rib fx's/  Left  7 to  10th  ribs. Pt on bipap machine and on IV abx for UTI.  Palliative care called for GOC by dtr, meeting is tomorrow 1/9/20. CXR: Cardiomegaly. Clear lungs.

## 2020-01-08 NOTE — PHYSICAL THERAPY INITIAL EVALUATION ADULT - DIAGNOSIS, PT EVAL
Pt with decreased strength, endurance and balance leading to moderate impairment in functional mobility.

## 2020-01-08 NOTE — PROGRESS NOTE ADULT - ASSESSMENT
89 yo F      with   h/o      DVT.  A FIB on Coumadin,     SSX,  s/p   PPM .    HTN,  hypothyroid  old  menigioma on ct.        recne  admission on 12/ 19,  for,  from mlple rib fx's/  Left  7 to  10 th  ribs. 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 on Coumadin.  daily  inr  echo,  12/24/ 19,   mod  to severe   AS/  severe TR/  RV  dysfunction/ high LV filing pressure    on  cardizem/ hydralazine/bumex/  at  n home /    iv  bumex/     still with  tachypnea. , has impeoved    AFIB,      coumadin per  inr/ pending   s/p  acute resp failure on 1/ 7, / needing bipap,  ct  chest,  pending  RAAD, check bladder scan/  bmp in am   spoke with  daughter       per  daughter/ pts  dr little  in [facility] / dr bonner,  [facility]  PT eval       < from: Transthoracic Echocardiogram (12.24.19 @ 09:49) >   Mild mitral regurgitation.  2. Calcified trileaflet aortic valve with decreased  opening. Peak transaortic valve gradient equals 34 mm Hg,  mean transaortic valve gradient equals 22 mm Hg, estimated  aortic valve area equals 1.3 sqcm (by continuity equation),  aortic valve velocity time integral equals 56 cm,  consistent with moderate to severe aortic stenosis  (severity may be underestimated). Mild aortic  regurgitation.  3. Severely dilated left atrium.  LA volume index = 75  cc/m2.  4. Increased relative wall thickness with normal left  ventricular mass index, consistent with concentric left  ventricular remodeling.  5. Normal left ventricular systolic function. No segmental  wall motion abnormalities. Septal flattening consistent  with right ventricular overload. Septal motion consistent  with conduction abnormality or pacing.  6. Increased E/e'  is consistent with elevated left  ventricular filling pressure.  7. Severe right atrial enlargement.  8. Right ventricular enlargement with decreased right  ventricular systolic function. A device wire is noted in  the right heart.  9. Malcoaptation of the tricuspid valve leaflets. Severe  tricuspid regurgitation.    < end of copied text >     < from: CT Head No Cont (12.19.19 @ 21:11) >  IMPRESSION: No acute intracranial hemorrhage.  Unchanged appearing right occipital convexity calcified/ossified meningioma.  Similar-appearing microvascular disease.  < end of copied text >     < from: CT Angio Abdomen and Pelvis w/ IV Cont (12.19.19 @ 17:30) >  BONES: Acute left seventh through 10th rib fractures. Age-indeterminate T6 compression fracture. Spinal degenerative changes. Mild retrolisthesis of L1 on L2 and mild anterolisthesis of L4 and L5.  IMPRESSION: Evidence of mild peripheral interstitial pulmonary edema.  Acute left seventh through 10th rib fractures.  OLGA SADLER M.D., RADIOLOGY RESIDENT

## 2020-01-08 NOTE — PHYSICAL THERAPY INITIAL EVALUATION ADULT - TRANSFER TRAINING, PT EVAL
GOAL: Pt will perform ALL transfers with CGA, w/use of appropriate assistive device as needed, in 2 weeks.

## 2020-01-08 NOTE — CONSULT NOTE ADULT - PROBLEM SELECTOR RECOMMENDATION 2
recommend dilaudid 0.2.g q4h prn dyspnea  c/w O2 supplementation recommend dilaudid 0.2.g ivp q4h prn dyspnea  c/w O2 supplementation

## 2020-01-08 NOTE — CONSULT NOTE ADULT - SUBJECTIVE AND OBJECTIVE BOX
HPI:	  89F pmhx of CHF, pulm htn, on home o2 Afib, Pacemaker, HTN. P[place] [facility] [place].  Patient AOx3, states that the cough and SOB started 3 days prior to admission.    PERTINENT PM/SXH:   Atrial fibrillation  Hypothyroid  HTN (hypertension)  DVT (deep venous thrombosis)    Cardiac pacemaker    FAMILY HISTORY:    ITEMS NOT CHECKED ARE NOT PRESENT    SOCIAL HISTORY:   Significant other/partner[x ]  Children x 2 [ ]  Mu-ism/Spirituality:  Substance hx:  [ ]   Tobacco hx:  [ ]   Alcohol hx: [ ]   Home Opioid hx:  [ ] I-Stop Reference No:  Living Situation: [ ]Home  [x ]Long term care  [ ]Rehab [ ]Other    ADVANCE DIRECTIVES:    DNR    MOLST  [ ]    Living Will  [ ]     DECISION MAKER(s):  [ ] Health Care Proxy(s)  [x ] Surrogate(s)  [ ] Guardian           Name(s): Phone Number(s): Ramiro [phone]    BASELINE (I)ADL(s) (prior to admission):  [place]: [ ]Total  [ ] Moderate [x ]Dependent    Allergies    No Known Allergies    Intolerances    MEDICATIONS  (STANDING):  bisacodyl 5 milliGRAM(s) Oral at bedtime  buMETAnide Injectable 1 milliGRAM(s) IV Push two times a day  chlorhexidine 2% Cloths 1 Application(s) Topical daily  diltiazem    milliGRAM(s) Oral daily  hydrALAZINE 25 milliGRAM(s) Oral two times a day  isosorbide   mononitrate ER Tablet (IMDUR) 30 milliGRAM(s) Oral daily  levothyroxine 88 MICROGram(s) Oral daily  senna 2 Tablet(s) Oral at bedtime    MEDICATIONS  (PRN):  traMADol 25 milliGRAM(s) Oral two times a day PRN Severe Pain (7 - 10)    PRESENT SYMPTOMS: [ ]Unable to obtain due to poor mentation   Source if other than patient:  [ ]Family   [ ]Team     Pain: [ ]yes [ ]no  QOL impact -   Location -                    Aggravating factors -  Quality -  Radiation -  Timing-  Severity (0-10 scale):  Minimal acceptable level (0-10 scale):     PAIN AD Score:     [email or web address].[place].[facility]/cog/painad.pdf (press ctrl +  left click to view)    Dyspnea:                           [ ]Mild [ ]Moderate [ ]Severe  Anxiety:                             [ ]Mild [ ]Moderate [ ]Severe  Fatigue:                             [ ]Mild [ ]Moderate [ ]Severe  Nausea:                            [ ]Mild [ ]Moderate [ ]Severe  Loss of appetite:               [ ]Mild [ ]Moderate [ ]Severe  Constipation:                    [ ]Mild [ ]Moderate [ ]Severe    Other Symptoms:  [ ]All other review of systems negative     Palliative Performance Status Version 2:     10-20    %    [email or web address]  PHYSICAL EXAM:  Vital Signs Last 24 Hrs  T(C): 36.6 (08 Jan 2020 13:17), Max: 36.8 (07 Jan 2020 16:11)  T(F): 97.8 (08 Jan 2020 13:17), Max: 98.3 (07 Jan 2020 16:11)  HR: 65 (08 Jan 2020 13:17) (65 - 87)  BP: 124/75 (08 Jan 2020 13:17) (107/64 - 124/75)  BP(mean): --  RR: 18 (08 Jan 2020 13:17) (18 - 20)  SpO2: 98% (08 Jan 2020 13:17) (94% - 99%) I&O's Summary    07 Jan 2020 07:01  -  08 Jan 2020 07:00  --------------------------------------------------------  IN: 350 mL / OUT: 900 mL / NET: -550 mL    08 Jan 2020 07:01  -  08 Jan 2020 13:33  --------------------------------------------------------  IN: 120 mL / OUT: 400 mL / NET: -280 mL      GENERAL:  [ ]Alert  [ ]Oriented x   [ ]Lethargic  [ ]Cachexia  [ ]Unarousable  [ ]Verbal  [ ]Non-Verbal    Behavioral:   [ ] Anxiety  [ ] Delirium [ ] Agitation [ ] Other    HEENT:  [ ]Normal   [ ]Dry mouth   [ ]ET Tube/Trach  [ ]Oral lesions    PULMONARY:   [ ]Clear [ ]Tachypnea  [ ]Audible excessive secretions   [ ]Rhonchi        [ ]Right [ ]Left [ ]Bilateral  [ ]Crackles        [ ]Right [ ]Left [ ]Bilateral  [ ]Wheezing     [ ]Right [ ]Left [ ]Bilatera  [ ]Diminished breath sounds [ ]right [ ]left [ ]bilateral    CARDIOVASCULAR:    [ ]Regular [ ]Irregular [ ]Tachy  [ ]Mak [ ]Murmur [ ]Other    GASTROINTESTINAL:  [ ]Soft  [ ]Distended   [ ]+BS  [ ]Non tender [ ]Tender  [ ]PEG [ ]OGT/ NGT  Last BM:       GENITOURINARY:  [ ]Normal [ ] Incontinent   [ ]Oliguria/Anuria   [ ]Shaw    [ ]Esternal cath    MUSCULOSKELETAL:   [ ]Normal   [ ]Weakness  [ ]Bed/Wheelchair bound [ ]Edema    NEUROLOGIC:   [ ]No focal deficits  [ ]Cognitive impairment  [ ]Dysphagia [ ]Dysarthria [ ]Paresis [ ]Other     SKIN:   [ ]Normal    [ ]Rash  [ ]Pressure ulcer(s)       Present on admission [ ]y [ ]n    CRITICAL CARE:  [ ]Shock Present  [ ]Septic [ ]Cardiogenic [ ]Neurologic [ ]Hypovolemic  [ ]  Vasopressors [ ]  Inotropes   [ ]Respiratory failure present [ ]Mechanical ventilation [ ]Non-invasive ventilatory support [ ]High flow  [ ]Acute  [ ]Chronic [ ]Hypoxic  [ ]Hypercarbic [ ]Other  [ ]Other organ failure     LABS:                        10.3   9.70  )-----------( 296      ( 08 Jan 2020 08:53 )             31.9   01-08    138  |  100  |  47<H>  ----------------------------<  84  4.5   |  28  |  1.61<H>    Ca    9.1      08 Jan 2020 06:18    PT/INR - ( 07 Jan 2020 13:02 )   PT: 20.1 sec;   INR: 1.73 ratio               RADIOLOGY & ADDITIONAL STUDIES:    PROTEIN CALORIE MALNUTRITION PRESENT: [ ]mild [ ]moderate [ ]severe [ ]underweight [ ]morbid obesity  https://www.andeal.org/vault/9350/web/files/ONC/Table_Clinical%20Characteristics%20to%20Document%20Malnutrition-White%20JV%20et%20al%202012.pdf    Height (cm): 157.5 (01-06-20 @ 16:24), 172.7 (12-19-19 @ 23:17)  Weight (kg): 75.7 (01-06-20 @ 16:24), 72.1 (12-19-19 @ 23:17)  BMI (kg/m2): 30.5 (01-06-20 @ 16:24), 24.2 (12-19-19 @ 23:17)    [ ]PPSV2 < or = to 30% [ ]significant weight loss  [ ]poor nutritional intake  [ ]anasarca     Albumin, Serum: 4.0 g/dL (01-05-20 @ 15:05)   [ ]Artificial Nutrition      REFERRALS:   [ ]Chaplaincy  [ ]Hospice  [ ]Child Life  [ ]Social Work  [ ]Case management [ ]Holistic Therapy     Goals of Care Document: HPI:	  89F pmhx of CHF, pulm htn, on home o2 Afib, Pacemaker, HTN. P/w sob from [facility] [place] home.  Patient AOx3, states that the cough and SOB started 3 days prior to admission.    PERTINENT PM/SXH:   Atrial fibrillation  Hypothyroid  HTN (hypertension)  DVT (deep venous thrombosis)    Cardiac pacemaker    FAMILY HISTORY:    ITEMS NOT CHECKED ARE NOT PRESENT    SOCIAL HISTORY:   Significant other/partner[x ]  Children x 3 [ ]  Yarsani/Spirituality:  Substance hx:  [ ]   Tobacco hx:  [ ]   Alcohol hx: [ ]   Home Opioid hx:  [ ] I-Stop Reference No:  Living Situation: [ ]Home  [x ]Long term care  [ ]Rehab [ ]Other    ADVANCE DIRECTIVES:    DNR    MOLST  [ ]    Living Will  [ ]     DECISION MAKER(s):  [ ] Health Care Proxy(s)  [x ] Surrogate(s)  [ ] Guardian           Name(s): Phone Number(s): Jimmie  [phone]/Soni [phone]/Ramiro [phone]    BASELINE (I)ADL(s) (prior to admission):  [place]: [ ]Total  [ ] Moderate [x ]Dependent    Allergies    No Known Allergies    Intolerances    MEDICATIONS  (STANDING):  bisacodyl 5 milliGRAM(s) Oral at bedtime  buMETAnide Injectable 1 milliGRAM(s) IV Push two times a day  chlorhexidine 2% Cloths 1 Application(s) Topical daily  diltiazem    milliGRAM(s) Oral daily  hydrALAZINE 25 milliGRAM(s) Oral two times a day  isosorbide   mononitrate ER Tablet (IMDUR) 30 milliGRAM(s) Oral daily  levothyroxine 88 MICROGram(s) Oral daily  senna 2 Tablet(s) Oral at bedtime    MEDICATIONS  (PRN):  traMADol 25 milliGRAM(s) Oral two times a day PRN Severe Pain (7 - 10)    PRESENT SYMPTOMS: [ ]Unable to obtain due to poor mentation   Source if other than patient:  [ ]Family   [ ]Team     Pain: [ ]yes [x ]no  QOL impact -   Location -                    Aggravating factors -  Quality -  Radiation -  Timing-  Severity (0-10 scale):  Minimal acceptable level (0-10 scale):     PAIN AD Score:     http://geriatrictoolkit.[facility]/cog/painad.pdf (press ctrl +  left click to view)    Dyspnea:                           [ ]Mild [x ]Moderate [ ]Severe  Anxiety:                             [ ]Mild [ ]Moderate [ ]Severe  Fatigue:                             [ ]Mild [ x]Moderate [ ]Severe  Nausea:                            [ ]Mild [ ]Moderate [ ]Severe  Loss of appetite:               [ ]Mild [x ]Moderate [ ]Severe  Constipation:                    [ ]Mild [ ]Moderate [ ]Severe    Other Symptoms:  [ ]All other review of systems negative     Palliative Performance Status Version 2:     10-20    %    http://[email or web address]/[email or web address]_performance_scale_[email or web address]  PHYSICAL EXAM:  Vital Signs Last 24 Hrs  T(C): 36.6 (08 Jan 2020 13:17), Max: 36.8 (07 Jan 2020 16:11)  T(F): 97.8 (08 Jan 2020 13:17), Max: 98.3 (07 Jan 2020 16:11)  HR: 65 (08 Jan 2020 13:17) (65 - 87)  BP: 124/75 (08 Jan 2020 13:17) (107/64 - 124/75)  BP(mean): --  RR: 18 (08 Jan 2020 13:17) (18 - 20)  SpO2: 98% (08 Jan 2020 13:17) (94% - 99%) I&O's Summary    07 Jan 2020 07:01  -  08 Jan 2020 07:00  --------------------------------------------------------  IN: 350 mL / OUT: 900 mL / NET: -550 mL    08 Jan 2020 07:01  -  08 Jan 2020 13:33  --------------------------------------------------------  IN: 120 mL / OUT: 400 mL / NET: -280 mL      GENERAL:  [ x]Alert  [ ]Oriented x   [ ]Lethargic  [ ]Cachexia  [ ]Unarousable  [ x]Verbal  [ ]Non-Verbal    Behavioral:   [ ] Anxiety  [ ] Delirium [ ] Agitation [ ] Other    HEENT:  [ ]Normal   [x ]Dry mouth   [ ]ET Tube/Trach  [ ]Oral lesions    PULMONARY: course sounds throughout  [ ]Clear [ ]Tachypnea  [ ]Audible excessive secretions   [ ]Rhonchi        [ ]Right [ ]Left [ ]Bilateral  [ ]Crackles        [ ]Right [ ]Left [ ]Bilateral  [ ]Wheezing     [ ]Right [ ]Left [ ]Bilatera  [x ]Diminished breath sounds [ ]right [ ]left [x ]bilateral at bases    CARDIOVASCULAR:    [x ]Regular [ ]Irregular [ ]Tachy  [ ]Mak [ ]Murmur [ ]Other    GASTROINTESTINAL:  [ x]Soft  [ ]Distended   [x ]+BS  [ ]Non tender [ ]Tender  [ ]PEG [ ]OGT/ NGT  Last BM:     GENITOURINARY:  [ ]Normal [ ] Incontinent   [ ]Oliguria/Anuria   [ ]Shaw    [ ]External cath    MUSCULOSKELETAL:   [ ]Normal   [ ]Weakness  [x ]Bed/Wheelchair bound [ ]Edema    NEUROLOGIC:   [ x]No focal deficits  [ ]Cognitive impairment  [ ]Dysphagia [ ]Dysarthria [ ]Paresis [ ]Other     SKIN:   [x ]Normal    [ ]Rash  [ ]Pressure ulcer(s)       Present on admission [ ]y [ ]n    CRITICAL CARE:  [ ]Shock Present  [ ]Septic [ ]Cardiogenic [ ]Neurologic [ ]Hypovolemic  [ ]  Vasopressors [ ]  Inotropes   [x ]Respiratory failure present [ ]Mechanical ventilation [x ]Non-invasive ventilatory support [ ]High flow  [x ]Acute  [x ]Chronic [ ]Hypoxic  [ ]Hypercarbic [ ]Other  [ ]Other organ failure     LABS:                        10.3   9.70  )-----------( 296      ( 08 Jan 2020 08:53 )             31.9   01-08    138  |  100  |  47<H>  ----------------------------<  84  4.5   |  28  |  1.61<H>    Ca    9.1      08 Jan 2020 06:18    PT/INR - ( 07 Jan 2020 13:02 )   PT: 20.1 sec;   INR: 1.73 ratio               RADIOLOGY & ADDITIONAL STUDIES:    PROTEIN CALORIE MALNUTRITION PRESENT: [ ]mild [ ]moderate [ ]severe [ ]underweight [ ]morbid obesity  https://www.andeal.org/vault/2440/web/files/ONC/Table_Clinical%20Characteristics%20to%20Document%20Malnutrition-White%20JV%20et%20al%202012.pdf    Height (cm): 157.5 (01-06-20 @ 16:24), 172.7 (12-19-19 @ 23:17)  Weight (kg): 75.7 (01-06-20 @ 16:24), 72.1 (12-19-19 @ 23:17)  BMI (kg/m2): 30.5 (01-06-20 @ 16:24), 24.2 (12-19-19 @ 23:17)    [ ]PPSV2 < or = to 30% [ ]significant weight loss  [ ]poor nutritional intake  [ ]anasarca     Albumin, Serum: 4.0 g/dL (01-05-20 @ 15:05)   [ ]Artificial Nutrition      REFERRALS:   [ ]Chaplaincy  [ ]Hospice  [ ]Child Life  [ ]Social Work  [ ]Case management [ ]Holistic Therapy     Goals of Care Document: HPI:	  89F pmhx of CHF, pulm htn, on home o2 Afib, Pacemaker, HTN. [place] [facility] [place]  Patient AOx3, states that the cough and SOB started 3 days prior to admission.    PERTINENT PM/SXH:   Atrial fibrillation  Hypothyroid  HTN (hypertension)  DVT (deep venous thrombosis)    Cardiac pacemaker    FAMILY HISTORY:    ITEMS NOT CHECKED ARE NOT PRESENT    SOCIAL HISTORY:   Significant other/partner[x ]  Children x 3 [ ]  Pentecostalism/Spirituality:  Substance hx:  [ ]   Tobacco hx:  [ ]   Alcohol hx: [ ]   Home Opioid hx:  [ ] I-Stop Reference No:  Living Situation: [ ]Home  [x ]Long term care  [ ]Rehab [ ]Other    ADVANCE DIRECTIVES:    DNR    MOLST  [ ]    Living Will  [ ]     DECISION MAKER(s):  [x ] Health Care Proxy(s)  [ ] Surrogate(s)  [ ] Guardian           Name(s): Phone Number(s): Jimmie  [phone]/Soni [phone]/Ramiro [phone]    BASELINE (I)ADL(s) (prior to admission):  [place]: [ ]Total  [ ] Moderate [x ]Dependent    Allergies    No Known Allergies    Intolerances    MEDICATIONS  (STANDING):  bisacodyl 5 milliGRAM(s) Oral at bedtime  buMETAnide Injectable 1 milliGRAM(s) IV Push two times a day  chlorhexidine 2% Cloths 1 Application(s) Topical daily  diltiazem    milliGRAM(s) Oral daily  hydrALAZINE 25 milliGRAM(s) Oral two times a day  isosorbide   mononitrate ER Tablet (IMDUR) 30 milliGRAM(s) Oral daily  levothyroxine 88 MICROGram(s) Oral daily  senna 2 Tablet(s) Oral at bedtime    MEDICATIONS  (PRN):  traMADol 25 milliGRAM(s) Oral two times a day PRN Severe Pain (7 - 10)    PRESENT SYMPTOMS: [ ]Unable to obtain due to poor mentation   Source if other than patient:  [ ]Family   [ ]Team     Pain: [ ]yes [x ]no  QOL impact -   Location -                    Aggravating factors -  Quality -  Radiation -  Timing-  Severity (0-10 scale):  Minimal acceptable level (0-10 scale):     PAIN AD Score:     http://geriatrictoolkit.[email or web address]/cog/painad.pdf (press ctrl +  left click to view)    Dyspnea:                           [ ]Mild [x ]Moderate [ ]Severe  Anxiety:                             [ ]Mild [ ]Moderate [ ]Severe  Fatigue:                             [ ]Mild [ x]Moderate [ ]Severe  Nausea:                            [ ]Mild [ ]Moderate [ ]Severe  Loss of appetite:               [ ]Mild [x ]Moderate [ ]Severe  Constipation:                    [ ]Mild [ ]Moderate [ ]Severe    Other Symptoms:  [ ]All other review of systems negative     Palliative Performance Status Version 2:     10-20    %    [email or web address]://[email or web address]/[email or web address]_performance_scale_[email or web address]  PHYSICAL EXAM:  Vital Signs Last 24 Hrs  T(C): 36.6 (08 Jan 2020 13:17), Max: 36.8 (07 Jan 2020 16:11)  T(F): 97.8 (08 Jan 2020 13:17), Max: 98.3 (07 Jan 2020 16:11)  HR: 65 (08 Jan 2020 13:17) (65 - 87)  BP: 124/75 (08 Jan 2020 13:17) (107/64 - 124/75)  BP(mean): --  RR: 18 (08 Jan 2020 13:17) (18 - 20)  SpO2: 98% (08 Jan 2020 13:17) (94% - 99%) I&O's Summary    07 Jan 2020 07:01  -  08 Jan 2020 07:00  --------------------------------------------------------  IN: 350 mL / OUT: 900 mL / NET: -550 mL    08 Jan 2020 07:01  -  08 Jan 2020 13:33  --------------------------------------------------------  IN: 120 mL / OUT: 400 mL / NET: -280 mL      GENERAL:  [ x]Alert  [ ]Oriented x   [ ]Lethargic  [ ]Cachexia  [ ]Unarousable  [ x]Verbal  [ ]Non-Verbal    Behavioral:   [ ] Anxiety  [ ] Delirium [ ] Agitation [ ] Other    HEENT:  [ ]Normal   [x ]Dry mouth   [ ]ET Tube/Trach  [ ]Oral lesions    PULMONARY: course sounds throughout  [ ]Clear [ ]Tachypnea  [ ]Audible excessive secretions   [ ]Rhonchi        [ ]Right [ ]Left [ ]Bilateral  [ ]Crackles        [ ]Right [ ]Left [ ]Bilateral  [ ]Wheezing     [ ]Right [ ]Left [ ]Bilatera  [x ]Diminished breath sounds [ ]right [ ]left [x ]bilateral at bases    CARDIOVASCULAR:    [x ]Regular [ ]Irregular [ ]Tachy  [ ]Mak [ ]Murmur [ ]Other    GASTROINTESTINAL:  [ x]Soft  [ ]Distended   [x ]+BS  [ ]Non tender [ ]Tender  [ ]PEG [ ]OGT/ NGT  Last BM:     GENITOURINARY:  [ ]Normal [ ] Incontinent   [ ]Oliguria/Anuria   [ ]Shaw    [ ]External cath    MUSCULOSKELETAL:   [ ]Normal   [ ]Weakness  [x ]Bed/Wheelchair bound [ ]Edema    NEUROLOGIC:   [ x]No focal deficits  [ ]Cognitive impairment  [ ]Dysphagia [ ]Dysarthria [ ]Paresis [ ]Other     SKIN:   [x ]Normal    [ ]Rash  [ ]Pressure ulcer(s)       Present on admission [ ]y [ ]n    CRITICAL CARE:  [ ]Shock Present  [ ]Septic [ ]Cardiogenic [ ]Neurologic [ ]Hypovolemic  [ ]  Vasopressors [ ]  Inotropes   [x ]Respiratory failure present [ ]Mechanical ventilation [x ]Non-invasive ventilatory support [ ]High flow  [x ]Acute  [x ]Chronic [ ]Hypoxic  [ ]Hypercarbic [ ]Other  [ ]Other organ failure     LABS:                        10.3   9.70  )-----------( 296      ( 08 Jan 2020 08:53 )             31.9   01-08    138  |  100  |  47<H>  ----------------------------<  84  4.5   |  28  |  1.61<H>    Ca    9.1      08 Jan 2020 06:18    PT/INR - ( 07 Jan 2020 13:02 )   PT: 20.1 sec;   INR: 1.73 ratio               RADIOLOGY & ADDITIONAL STUDIES:    PROTEIN CALORIE MALNUTRITION PRESENT: [ ]mild [ ]moderate [ ]severe [ ]underweight [ ]morbid obesity  https://www.andeal.org/vault/2440/web/files/ONC/Table_Clinical%20Characteristics%20to%20Document%20Malnutrition-White%20JV%20et%20al%202012.pdf    Height (cm): 157.5 (01-06-20 @ 16:24), 172.7 (12-19-19 @ 23:17)  Weight (kg): 75.7 (01-06-20 @ 16:24), 72.1 (12-19-19 @ 23:17)  BMI (kg/m2): 30.5 (01-06-20 @ 16:24), 24.2 (12-19-19 @ 23:17)    [ ]PPSV2 < or = to 30% [ ]significant weight loss  [ ]poor nutritional intake  [ ]anasarca     Albumin, Serum: 4.0 g/dL (01-05-20 @ 15:05)   [ ]Artificial Nutrition      REFERRALS:   [ ]Chaplaincy  [ ]Hospice  [ ]Child Life  [ ]Social Work  [ ]Case management [ ]Holistic Therapy     Goals of Care Document:

## 2020-01-09 DIAGNOSIS — Z71.89 OTHER SPECIFIED COUNSELING: ICD-10-CM

## 2020-01-09 LAB
ANION GAP SERPL CALC-SCNC: 16 MMOL/L — SIGNIFICANT CHANGE UP (ref 5–17)
APTT BLD: 32.3 SEC — SIGNIFICANT CHANGE UP (ref 27.5–36.3)
BUN SERPL-MCNC: 39 MG/DL — HIGH (ref 7–23)
CALCIUM SERPL-MCNC: 9 MG/DL — SIGNIFICANT CHANGE UP (ref 8.4–10.5)
CHLORIDE SERPL-SCNC: 97 MMOL/L — SIGNIFICANT CHANGE UP (ref 96–108)
CO2 SERPL-SCNC: 26 MMOL/L — SIGNIFICANT CHANGE UP (ref 22–31)
CREAT SERPL-MCNC: 1.11 MG/DL — SIGNIFICANT CHANGE UP (ref 0.5–1.3)
GLUCOSE SERPL-MCNC: 85 MG/DL — SIGNIFICANT CHANGE UP (ref 70–99)
INR BLD: 1.57 RATIO — HIGH (ref 0.88–1.16)
POTASSIUM SERPL-MCNC: 4 MMOL/L — SIGNIFICANT CHANGE UP (ref 3.5–5.3)
POTASSIUM SERPL-SCNC: 4 MMOL/L — SIGNIFICANT CHANGE UP (ref 3.5–5.3)
PROTHROM AB SERPL-ACNC: 18 SEC — HIGH (ref 10–13.1)
SODIUM SERPL-SCNC: 139 MMOL/L — SIGNIFICANT CHANGE UP (ref 135–145)

## 2020-01-09 PROCEDURE — 99232 SBSQ HOSP IP/OBS MODERATE 35: CPT

## 2020-01-09 PROCEDURE — 99233 SBSQ HOSP IP/OBS HIGH 50: CPT

## 2020-01-09 PROCEDURE — 99497 ADVNCD CARE PLAN 30 MIN: CPT | Mod: 25

## 2020-01-09 RX ORDER — IPRATROPIUM/ALBUTEROL SULFATE 18-103MCG
3 AEROSOL WITH ADAPTER (GRAM) INHALATION EVERY 6 HOURS
Refills: 0 | Status: DISCONTINUED | OUTPATIENT
Start: 2020-01-09 | End: 2020-01-14

## 2020-01-09 RX ORDER — SODIUM CHLORIDE 9 MG/ML
5 INJECTION INTRAMUSCULAR; INTRAVENOUS; SUBCUTANEOUS EVERY 6 HOURS
Refills: 0 | Status: DISCONTINUED | OUTPATIENT
Start: 2020-01-09 | End: 2020-01-14

## 2020-01-09 RX ORDER — WARFARIN SODIUM 2.5 MG/1
6 TABLET ORAL ONCE
Refills: 0 | Status: COMPLETED | OUTPATIENT
Start: 2020-01-09 | End: 2020-01-09

## 2020-01-09 RX ORDER — BUMETANIDE 0.25 MG/ML
1 INJECTION INTRAMUSCULAR; INTRAVENOUS DAILY
Refills: 0 | Status: DISCONTINUED | OUTPATIENT
Start: 2020-01-09 | End: 2020-01-14

## 2020-01-09 RX ORDER — ENOXAPARIN SODIUM 100 MG/ML
40 INJECTION SUBCUTANEOUS ONCE
Refills: 0 | Status: COMPLETED | OUTPATIENT
Start: 2020-01-09 | End: 2020-01-09

## 2020-01-09 RX ORDER — ACETAMINOPHEN 500 MG
1000 TABLET ORAL ONCE
Refills: 0 | Status: DISCONTINUED | OUTPATIENT
Start: 2020-01-09 | End: 2020-01-09

## 2020-01-09 RX ORDER — ACETAMINOPHEN 500 MG
650 TABLET ORAL EVERY 8 HOURS
Refills: 0 | Status: DISCONTINUED | OUTPATIENT
Start: 2020-01-09 | End: 2020-01-13

## 2020-01-09 RX ORDER — WARFARIN SODIUM 2.5 MG/1
4 TABLET ORAL ONCE
Refills: 0 | Status: DISCONTINUED | OUTPATIENT
Start: 2020-01-09 | End: 2020-01-09

## 2020-01-09 RX ORDER — HYDROMORPHONE HYDROCHLORIDE 2 MG/ML
0.2 INJECTION INTRAMUSCULAR; INTRAVENOUS; SUBCUTANEOUS EVERY 4 HOURS
Refills: 0 | Status: DISCONTINUED | OUTPATIENT
Start: 2020-01-09 | End: 2020-01-11

## 2020-01-09 RX ORDER — ACETAMINOPHEN 500 MG
1000 TABLET ORAL EVERY 8 HOURS
Refills: 0 | Status: DISCONTINUED | OUTPATIENT
Start: 2020-01-09 | End: 2020-01-09

## 2020-01-09 RX ORDER — DILTIAZEM HCL 120 MG
60 CAPSULE, EXT RELEASE 24 HR ORAL EVERY 6 HOURS
Refills: 0 | Status: DISCONTINUED | OUTPATIENT
Start: 2020-01-09 | End: 2020-01-13

## 2020-01-09 RX ADMIN — WARFARIN SODIUM 6 MILLIGRAM(S): 2.5 TABLET ORAL at 22:49

## 2020-01-09 RX ADMIN — Medication 60 MILLIGRAM(S): at 14:10

## 2020-01-09 RX ADMIN — LEVALBUTEROL 0.63 MILLIGRAM(S): 1.25 SOLUTION, CONCENTRATE RESPIRATORY (INHALATION) at 13:10

## 2020-01-09 RX ADMIN — Medication 650 MILLIGRAM(S): at 15:10

## 2020-01-09 RX ADMIN — Medication 650 MILLIGRAM(S): at 22:46

## 2020-01-09 RX ADMIN — Medication 25 MILLIGRAM(S): at 06:40

## 2020-01-09 RX ADMIN — LEVALBUTEROL 0.63 MILLIGRAM(S): 1.25 SOLUTION, CONCENTRATE RESPIRATORY (INHALATION) at 06:40

## 2020-01-09 RX ADMIN — SODIUM CHLORIDE 5 MILLILITER(S): 9 INJECTION INTRAMUSCULAR; INTRAVENOUS; SUBCUTANEOUS at 19:16

## 2020-01-09 RX ADMIN — Medication 60 MILLIGRAM(S): at 18:26

## 2020-01-09 RX ADMIN — ISOSORBIDE MONONITRATE 30 MILLIGRAM(S): 60 TABLET, EXTENDED RELEASE ORAL at 14:10

## 2020-01-09 RX ADMIN — Medication 600 MILLIGRAM(S): at 18:25

## 2020-01-09 RX ADMIN — BUMETANIDE 1 MILLIGRAM(S): 0.25 INJECTION INTRAMUSCULAR; INTRAVENOUS at 06:40

## 2020-01-09 RX ADMIN — Medication 650 MILLIGRAM(S): at 14:10

## 2020-01-09 RX ADMIN — Medication 650 MILLIGRAM(S): at 23:16

## 2020-01-09 RX ADMIN — Medication 3 MILLILITER(S): at 18:54

## 2020-01-09 RX ADMIN — LEVALBUTEROL 0.63 MILLIGRAM(S): 1.25 SOLUTION, CONCENTRATE RESPIRATORY (INHALATION) at 18:54

## 2020-01-09 RX ADMIN — Medication 25 MILLIGRAM(S): at 18:26

## 2020-01-09 RX ADMIN — ENOXAPARIN SODIUM 40 MILLIGRAM(S): 100 INJECTION SUBCUTANEOUS at 14:14

## 2020-01-09 RX ADMIN — Medication 88 MICROGRAM(S): at 06:41

## 2020-01-09 RX ADMIN — TRAMADOL HYDROCHLORIDE 25 MILLIGRAM(S): 50 TABLET ORAL at 04:15

## 2020-01-09 RX ADMIN — Medication 60 MILLIGRAM(S): at 06:40

## 2020-01-09 RX ADMIN — CHLORHEXIDINE GLUCONATE 1 APPLICATION(S): 213 SOLUTION TOPICAL at 13:16

## 2020-01-09 RX ADMIN — LEVALBUTEROL 0.63 MILLIGRAM(S): 1.25 SOLUTION, CONCENTRATE RESPIRATORY (INHALATION) at 00:25

## 2020-01-09 RX ADMIN — TRAMADOL HYDROCHLORIDE 25 MILLIGRAM(S): 50 TABLET ORAL at 19:26

## 2020-01-09 RX ADMIN — Medication 3 MILLILITER(S): at 13:16

## 2020-01-09 NOTE — PROGRESS NOTE ADULT - SUBJECTIVE AND OBJECTIVE BOX
CARDIOLOGY     PROGRESS  NOTE   ________________________________________________    CHIEF COMPLAINT:Patient is a 89y old  Female who presents with a chief complaint of sob (08 Jan 2020 14:07)  no complain.  	  REVIEW OF SYSTEMS:  CONSTITUTIONAL: No fever, weight loss, or fatigue  EYES: No eye pain, visual disturbances, or discharge  ENT:  No difficulty hearing, tinnitus, vertigo; No sinus or throat pain  NECK: No pain or stiffness  RESPIRATORY: No cough, wheezing, chills or hemoptysis; decrease  Shortness of Breath  CARDIOVASCULAR: No chest pain, palpitations, passing out, dizziness, or leg swelling  GASTROINTESTINAL: No abdominal or epigastric pain. No nausea, vomiting, or hematemesis; No diarrhea or constipation. No melena or hematochezia.  GENITOURINARY: No dysuria, frequency, hematuria, or incontinence  NEUROLOGICAL: No headaches, memory loss, loss of strength, numbness, or tremors  SKIN: No itching, burning, rashes, or lesions   LYMPH Nodes: No enlarged glands  ENDOCRINE: No heat or cold intolerance; No hair loss  MUSCULOSKELETAL: No joint pain or swelling; No muscle, back, or extremity pain  PSYCHIATRIC: No depression, anxiety, mood swings, or difficulty sleeping  HEME/LYMPH: No easy bruising, or bleeding gums  ALLERGY AND IMMUNOLOGIC: No hives or eczema	    [ ] All others negative	  [ ] Unable to obtain    PHYSICAL EXAM:  T(C): 36.5 (01-09-20 @ 05:00), Max: 36.6 (01-08-20 @ 13:17)  HR: 63 (01-09-20 @ 05:00) (63 - 87)  BP: 149/78 (01-09-20 @ 05:00) (120/54 - 149/78)  RR: 18 (01-09-20 @ 05:00) (18 - 18)  SpO2: 96% (01-09-20 @ 05:00) (94% - 99%)  Wt(kg): --  I&O's Summary    08 Jan 2020 07:01  -  09 Jan 2020 07:00  --------------------------------------------------------  IN: 970 mL / OUT: 900 mL / NET: 70 mL        Appearance: Normal	  HEENT:   Normal oral mucosa, PERRL, EOMI	  Lymphatic: No lymphadenopathy  Cardiovascular: Normal S1 S2, No JVD, + murmurs, No edema  Respiratory: rhonchi  Psychiatry: A & O x 3, Mood & affect appropriate  Gastrointestinal:  Soft, Non-tender, + BS	  Skin: No rashes, No ecchymoses, No cyanosis	  Neurologic: Non-focal  Extremities: Normal range of motion, No clubbing, cyanosis or edema  Vascular: Peripheral pulses palpable 2+ bilaterally    MEDICATIONS  (STANDING):  acetaminophen  IVPB .. 1000 milliGRAM(s) IV Intermittent once  bisacodyl 5 milliGRAM(s) Oral at bedtime  buMETAnide Injectable 1 milliGRAM(s) IV Push two times a day  chlorhexidine 2% Cloths 1 Application(s) Topical daily  diltiazem    Tablet 60 milliGRAM(s) Oral every 6 hours  hydrALAZINE 25 milliGRAM(s) Oral two times a day  isosorbide   mononitrate ER Tablet (IMDUR) 30 milliGRAM(s) Oral daily  levalbuterol Inhalation 0.63 milliGRAM(s) Inhalation every 6 hours  levothyroxine 88 MICROGram(s) Oral daily  senna 2 Tablet(s) Oral at bedtime      TELEMETRY: 	    ECG:  	  RADIOLOGY:  OTHER: 	  	  LABS:	 	    CARDIAC MARKERS:                                10.3   9.70  )-----------( 296      ( 08 Jan 2020 08:53 )             31.9     01-09    139  |  97  |  39<H>  ----------------------------<  85  4.0   |  26  |  1.11    Ca    9.0      09 Jan 2020 07:15      proBNP: Serum Pro-Brain Natriuretic Peptide: 8930 pg/mL (01-05 @ 15:05)  Serum Pro-Brain Natriuretic Peptide: 5543 pg/mL (12-19 @ 13:19)    Lipid Profile:   HgA1c:   TSH: Thyroid Stimulating Hormone, Serum: 6.79 uIU/mL (12-20 @ 09:12)    PT/INR - ( 08 Jan 2020 14:45 )   PT: 17.8 sec;   INR: 1.53 ratio         PTT - ( 08 Jan 2020 14:45 )  PTT:31.7 sec    < from: CT Chest No Cont (01.08.20 @ 12:38) >  Small bilateral pleural effusions.     Areas of mucous plugging in the right middle and bilateral lower lobes with associated atelectasis.          Assessment and plan  ---------------------------  89y Female complaining of difficulty breathing.	  5F pmhx of CHF, Afib, Pacemaker, HTN presents from nursing facility for SOB and course breath sounds. As per EMS she was on 2L NC, but O2 tank ran out. Patient AOx3, states that the cough and SOB started 3 days ago. Denies fevers, chills, N/V/D, chest pain.  pt was recently admitted with chf and was treated medically.  pt with sig valvular heart disease on medical therapy.  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  iv bumex  continue all cardiac meds  pt needs to wear her bipap at night in Rehab and home.  continue diuresis  add imdur 30 mg daily as tolerated  still with sob check ct chest no contrast r/o aspiration, ct noted  doing better  decrease bumex  pulmonary eval  ? adding  steroid and suction CARDIOLOGY     PROGRESS  NOTE   ________________________________________________    CHIEF COMPLAINT:Patient is a 89y old  Female who presents with a chief complaint of sob (08 Jan 2020 14:07)  no complain.  	  REVIEW OF SYSTEMS:  CONSTITUTIONAL: No fever, weight loss, or fatigue  EYES: No eye pain, visual disturbances, or discharge  ENT:  No difficulty hearing, tinnitus, vertigo; No sinus or throat pain  NECK: No pain or stiffness  RESPIRATORY: No cough, wheezing, chills or hemoptysis; decrease  Shortness of Breath  CARDIOVASCULAR: No chest pain, palpitations, passing out, dizziness, or leg swelling  GASTROINTESTINAL: No abdominal or epigastric pain. No nausea, vomiting, or hematemesis; No diarrhea or constipation. No melena or hematochezia.  GENITOURINARY: No dysuria, frequency, hematuria, or incontinence  NEUROLOGICAL: No headaches, memory loss, loss of strength, numbness, or tremors  SKIN: No itching, burning, rashes, or lesions   LYMPH Nodes: No enlarged glands  ENDOCRINE: No heat or cold intolerance; No hair loss  MUSCULOSKELETAL: No joint pain or swelling; No muscle, back, or extremity pain  PSYCHIATRIC: No depression, anxiety, mood swings, or difficulty sleeping  HEME/LYMPH: No easy bruising, or bleeding gums  ALLERGY AND IMMUNOLOGIC: No hives or eczema	    [ ] All others negative	  [ ] Unable to obtain    PHYSICAL EXAM:  T(C): 36.5 (01-09-20 @ 05:00), Max: 36.6 (01-08-20 @ 13:17)  HR: 63 (01-09-20 @ 05:00) (63 - 87)  BP: 149/78 (01-09-20 @ 05:00) (120/54 - 149/78)  RR: 18 (01-09-20 @ 05:00) (18 - 18)  SpO2: 96% (01-09-20 @ 05:00) (94% - 99%)  Wt(kg): --  I&O's Summary    08 Jan 2020 07:01  -  09 Jan 2020 07:00  --------------------------------------------------------  IN: 970 mL / OUT: 900 mL / NET: 70 mL        Appearance: Normal	  HEENT:   Normal oral mucosa, PERRL, EOMI	  Lymphatic: No lymphadenopathy  Cardiovascular: Normal S1 S2, No JVD, + murmurs, No edema  Respiratory: rhonchi  Psychiatry: A & O x 3, Mood & affect appropriate  Gastrointestinal:  Soft, Non-tender, + BS	  Skin: No rashes, No ecchymoses, No cyanosis	  Neurologic: Non-focal  Extremities: Normal range of motion, No clubbing, cyanosis or edema  Vascular: Peripheral pulses palpable 2+ bilaterally    MEDICATIONS  (STANDING):  acetaminophen  IVPB .. 1000 milliGRAM(s) IV Intermittent once  bisacodyl 5 milliGRAM(s) Oral at bedtime  buMETAnide Injectable 1 milliGRAM(s) IV Push two times a day  chlorhexidine 2% Cloths 1 Application(s) Topical daily  diltiazem    Tablet 60 milliGRAM(s) Oral every 6 hours  hydrALAZINE 25 milliGRAM(s) Oral two times a day  isosorbide   mononitrate ER Tablet (IMDUR) 30 milliGRAM(s) Oral daily  levalbuterol Inhalation 0.63 milliGRAM(s) Inhalation every 6 hours  levothyroxine 88 MICROGram(s) Oral daily  senna 2 Tablet(s) Oral at bedtime      TELEMETRY: 	    ECG:  	  RADIOLOGY:  OTHER: 	  	  LABS:	 	    CARDIAC MARKERS:                                10.3   9.70  )-----------( 296      ( 08 Jan 2020 08:53 )             31.9     01-09    139  |  97  |  39<H>  ----------------------------<  85  4.0   |  26  |  1.11    Ca    9.0      09 Jan 2020 07:15      proBNP: Serum Pro-Brain Natriuretic Peptide: 8930 pg/mL (01-05 @ 15:05)  Serum Pro-Brain Natriuretic Peptide: 5543 pg/mL (12-19 @ 13:19)    Lipid Profile:   HgA1c:   TSH: Thyroid Stimulating Hormone, Serum: 6.79 uIU/mL (12-20 @ 09:12)    PT/INR - ( 08 Jan 2020 14:45 )   PT: 17.8 sec;   INR: 1.53 ratio         PTT - ( 08 Jan 2020 14:45 )  PTT:31.7 sec    < from: CT Chest No Cont (01.08.20 @ 12:38) >  Small bilateral pleural effusions.     Areas of mucous plugging in the right middle and bilateral lower lobes with associated atelectasis.          Assessment and plan  ---------------------------  89y Female complaining of difficulty breathing.	  5F pmhx of CHF, Afib, Pacemaker, HTN presents from nursing facility for SOB and course breath sounds. As per EMS she was on 2L NC, but O2 tank ran out. Patient AOx3, states that the cough and SOB started 3 days ago. Denies fevers, chills, N/V/D, chest pain.  pt was recently admitted with chf and was treated medically.  pt with sig valvular heart disease on medical therapy.  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  iv bumex  continue all cardiac meds  pt needs to wear her bipap at night in Rehab and home.  continue diuresis  add imdur 30 mg daily as tolerated  still with sob check ct chest no contrast r/o aspiration, ct noted  doing better  decrease bumex  pulmonary eval  ? adding  steroid and suction/ duoneb  ac keep ir 2-3

## 2020-01-09 NOTE — PROGRESS NOTE ADULT - ASSESSMENT
89F pmhx of CHF, pulm htn, on home o2, Afib, Pacemaker, HTN. P/w sob from rehab at [facility].  Likely due to acute on chronic CHF.  Palliative care called for GOC by dtr.

## 2020-01-09 NOTE — PROGRESS NOTE ADULT - SUBJECTIVE AND OBJECTIVE BOX
weak/     mild  tachypnea  REVIEW OF SYSTEMS:  GEN: no fever,    no chills  RESP: no SOB,   no cough  CVS: no chest pain,   no palpitations  GI: no abdominal pain,   no nausea,   no vomiting,   no constipation,   no diarrhea  : no dysuria,   no frequency  NEURO: no headache,   no dizziness  PSYCH: no depression,   not anxious  Derm : no rash    MEDICATIONS  (STANDING):  acetaminophen  IVPB .. 1000 milliGRAM(s) IV Intermittent once  bisacodyl 5 milliGRAM(s) Oral at bedtime  buMETAnide Injectable 1 milliGRAM(s) IV Push daily  chlorhexidine 2% Cloths 1 Application(s) Topical daily  diltiazem    Tablet 60 milliGRAM(s) Oral every 6 hours  hydrALAZINE 25 milliGRAM(s) Oral two times a day  isosorbide   mononitrate ER Tablet (IMDUR) 30 milliGRAM(s) Oral daily  levalbuterol Inhalation 0.63 milliGRAM(s) Inhalation every 6 hours  levothyroxine 88 MICROGram(s) Oral daily  senna 2 Tablet(s) Oral at bedtime    MEDICATIONS  (PRN):  traMADol 25 milliGRAM(s) Oral two times a day PRN Severe Pain (7 - 10)      Vital Signs Last 24 Hrs  T(C): 36.5 (09 Jan 2020 05:00), Max: 36.6 (08 Jan 2020 13:17)  T(F): 97.7 (09 Jan 2020 05:00), Max: 97.9 (08 Jan 2020 17:29)  HR: 63 (09 Jan 2020 05:00) (63 - 87)  BP: 149/78 (09 Jan 2020 05:00) (120/54 - 149/78)  BP(mean): --  RR: 18 (09 Jan 2020 05:00) (18 - 18)  SpO2: 96% (09 Jan 2020 05:00) (94% - 99%)  CAPILLARY BLOOD GLUCOSE        I&O's Summary    08 Jan 2020 07:01  -  09 Jan 2020 07:00  --------------------------------------------------------  IN: 970 mL / OUT: 900 mL / NET: 70 mL        PHYSICAL EXAM:  HEAD:  Atraumatic, Normocephalic  NECK: Supple, No   JVD  CHEST/LUNG:   no     rales,     no,    rhonchi  HEART: Regular rate and rhythm;         murmur  ABDOMEN: Soft, Nontender, ;   EXTREMITIES:     lessert    LABS:                        10.3   9.70  )-----------( 296      ( 08 Jan 2020 08:53 )             31.9     01-09    139  |  97  |  39<H>  ----------------------------<  85  4.0   |  26  |  1.11    Ca    9.0      09 Jan 2020 07:15      PT/INR - ( 08 Jan 2020 14:45 )   PT: 17.8 sec;   INR: 1.53 ratio         PTT - ( 08 Jan 2020 14:45 )  PTT:31.7 sec                Thyroid Stimulating Hormone, Serum: 6.79 uIU/mL (12-20 @ 09:12)          Consultant(s) Notes Reviewed:      Care Discussed with Consultants/Other Providers:

## 2020-01-09 NOTE — PROGRESS NOTE ADULT - SUBJECTIVE AND OBJECTIVE BOX
SUBJECTIVE AND OBJECTIVE: pt sitting in chair in NAD with family at bedside.  INTERVAL HPI/OVERNIGHT EVENTS: on bipap tolerating well    DNR on chart:   Allergies    No Known Allergies    Intolerances    MEDICATIONS  (STANDING):  albuterol/ipratropium for Nebulization 3 milliLiter(s) Nebulizer every 6 hours  bisacodyl 5 milliGRAM(s) Oral at bedtime  buMETAnide Injectable 1 milliGRAM(s) IV Push daily  chlorhexidine 2% Cloths 1 Application(s) Topical daily  diltiazem    Tablet 60 milliGRAM(s) Oral every 6 hours  hydrALAZINE 25 milliGRAM(s) Oral two times a day  isosorbide   mononitrate ER Tablet (IMDUR) 30 milliGRAM(s) Oral daily  levalbuterol Inhalation 0.63 milliGRAM(s) Inhalation every 6 hours  levothyroxine 88 MICROGram(s) Oral daily  senna 2 Tablet(s) Oral at bedtime  warfarin 6 milliGRAM(s) Oral once    MEDICATIONS  (PRN):  acetaminophen   Tablet .. 650 milliGRAM(s) Oral every 8 hours PRN Mild Pain (1 - 3)  traMADol 25 milliGRAM(s) Oral two times a day PRN Severe Pain (7 - 10)      ITEMS UNCHECKED ARE NOT PRESENT    PRESENT SYMPTOMS: [ ]Unable to obtain due to poor mentation   Source if other than patient:  [ ]Family   [ ]Team     Pain:  [ ]yes [ ]no  QOL impact -   Location -                    Aggravating factors -  Quality -  Radiation -  Timing-  Severity (0-10 scale):  Minimal acceptable level (0-10 scale):     Dyspnea:                           [ x]Mild [ ]Moderate [ ]Severe  Anxiety:                             [ ]Mild [ ]Moderate [ ]Severe  Fatigue:                             [x ]Mild [ ]Moderate [ ]Severe  Nausea:                            [ ]Mild [ ]Moderate [ ]Severe  Loss of appetite:              [ ]Mild [ ]Moderate [ ]Severe  Constipation:                    [ ]Mild [ ]Moderate [ ]Severe    PAIN AD Score:	  http://geriatrictoolkit.[employer].edu/cog/painad.pdf (Ctrl + left click to view)    Other Symptoms:  [ ]All other review of systems negative     Palliative Performance Status Version 2:   40      %      http://npcrc.org/files/news/palliative_performance_scale_ppsv2.pdf    PHYSICAL EXAM:  Vital Signs Last 24 Hrs  T(C): 36.4 (09 Jan 2020 13:21), Max: 36.6 (08 Jan 2020 17:29)  T(F): 97.5 (09 Jan 2020 13:21), Max: 97.9 (08 Jan 2020 17:29)  HR: 63 (09 Jan 2020 13:21) (63 - 74)  BP: 152/80 (09 Jan 2020 13:21) (120/54 - 152/80)  BP(mean): --  RR: 20 (09 Jan 2020 13:21) (18 - 20)  SpO2: 99% (09 Jan 2020 13:21) (88% - 99%) I&O's Summary    08 Jan 2020 07:01  -  09 Jan 2020 07:00  --------------------------------------------------------  IN: 970 mL / OUT: 900 mL / NET: 70 mL    09 Jan 2020 07:01  -  09 Jan 2020 14:40  --------------------------------------------------------  IN: 275 mL / OUT: 400 mL / NET: -125 mL       GENERAL:  [x ]Alert  [x ]Oriented x 3  [ ]Lethargic  [ ]Cachexia  [ ]Unarousable  [x ]Verbal  [ ]Non-Verbal    Behavioral:   [ ]Anxiety  [ ]Delirium [ ]Agitation [ ]Other    HEENT:  [ ]Normal   [x ]Dry mouth   [ ]ET Tube/Trach  [ ]Oral lesions    PULMONARY:   [ ]Clear [ ]Tachypnea  [ ]Audible excessive secretions   [ ]Rhonchi        [ ]Right [ ]Left [ ]Bilateral  [ ]Crackles        [ ]Right [ ]Left [ ]Bilateral  [ ]Wheezing     [ ]Right [ ]Left [ ]Bilateral  [x ]Diminished BS [ ] Right [ ]Left [ x]Bilateral at bases     CARDIOVASCULAR:    [x ]Regular [x ]Irregular [ ]Tachy  [ ]Mak [ ]Murmur [ ]Other    GASTROINTESTINAL:  [x ]Soft  [ ]Distended   [x ]+BS  [x ]Non tender [ ]Tender  [ ]PEG [ ]OGT/ NGT   Last BM:      GENITOURINARY:  [ ]Normal [x ]Incontinent   [ ]Oliguria/Anuria   [ ]Shaw   [ ] External cath    MUSCULOSKELETAL:   [x ]Normal   [ ]Weakness  [ ]Bed/Wheelchair bound [ ]Edema    NEUROLOGIC:   [x ]No focal deficits  [ ] Cognitive impairment  [ ] Dysphagia [ ]Dysarthria [ ] Paresis [ ]Other     SKIN:   [ x]Normal  [ ]Rash   [ ]Pressure ulcer(s) [x ]y [ ]n present on admission  [x] other - sternal chest wound    CRITICAL CARE:  [ ]Shock Present  [ ]Septic [ ]Cardiogenic [ ]Neurologic [ ]Hypovolemic  [ ]Vasopressors [ ]Inotropes  [x ]Respiratory failure present [ ]Mechanical Ventilation [x ]Non-invasive ventilatory support [ ]High-Flow  [x ]Acute  [ ]Chronic [ ]Hypoxic  [ ]Hypercarbic [ ]Other  [ ]Other organ failure     LABS:                        10.3   9.70  )-----------( 296      ( 08 Jan 2020 08:53 )             31.9   01-09    139  |  97  |  39<H>  ----------------------------<  85  4.0   |  26  |  1.11    Ca    9.0      09 Jan 2020 07:15    PT/INR - ( 09 Jan 2020 08:25 )   PT: 18.0 sec;   INR: 1.57 ratio         PTT - ( 09 Jan 2020 08:25 )  PTT:32.3 sec      RADIOLOGY & ADDITIONAL STUDIES:    Protein Calorie Malnutrition Present: [ ]mild [ ]moderate [ ]severe [ ]underweight [ ]morbid obesity  https://www.andeal.org/vault/2440/web/files/ONC/Table_Clinical%20Characteristics%20to%20Document%20Malnutrition-White%20JV%20et%20al%747672.pdf    Height (cm): 157.5 (01-06-20 @ 16:24), 172.7 (12-19-19 @ 23:17)  Weight (kg): 75.7 (01-06-20 @ 16:24), 72.1 (12-19-19 @ 23:17)  BMI (kg/m2): 30.5 (01-06-20 @ 16:24), 24.2 (12-19-19 @ 23:17)    [ ]PPSV2 < or = 30%  [ ]significant weight loss [ ]poor nutritional intake [ ]anasarca   Albumin, Serum: 4.0 g/dL (01-05-20 @ 15:05)   [ ]Artificial Nutrition    REFERRALS:   [ ]Chaplaincy  [ ]Hospice  [ ]Child Life  [ ]Social Work  [ ]Case management [ ]Holistic Therapy     Goals of Care Document:

## 2020-01-09 NOTE — PROGRESS NOTE ADULT - PROBLEM SELECTOR PLAN 4
Met with pts children Soni Samuel, and Ramiro.  Reviewed pts case and their understanding of hospital course. Discussed ACP for greater then 30 mins. pt verbalized that she would never want to be on life support or be kept alive artificially.  Pt is a DNR/DNI.  MOLST form completed and placed in chart.  limit blood draws to daily for INR, and vitals signs daily.   Reviewed options with pts children  with current respiratory status including but not limited to removal of bipap and wean of bipap to NC both with concurrent opiate management in PCU vs in current room.  They are all in agreement that they would prefer the pt to transfer to the PCU for removal of BIPAP.  We will meet tomorrow to further discuss this with the pt.  Pts children ask that we discuss respiratory status with their mom after they have had some time to process all the information that they received today.  FM tomorrow at 1pm  to discuss further.

## 2020-01-09 NOTE — PROGRESS NOTE ADULT - ASSESSMENT
89 yo F      with   h/o      DVT.  A FIB on Coumadin,     SSX,  s/p   PPM .    HTN,  hypothyroid  old  menigioma on ct.        recne  admission on 12/ 19,  for,  from mlple rib fx's/  Left  7 to  10 th  ribs. 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 on Coumadin.  daily  inr  echo,  12/24/ 19,   mod  to severe   AS/  severe TR/  RV  dysfunction/ high LV filing pressure    on  cardizem/ hydralazine/bumex/  at  n home /    iv  bumex/     still with  tachypnea. , has impeoved    AFIB,      coumadin per  inr/ pending   s/p  acute resp failure on 1/ 7, / needing bipap,    ct  chest,   mucous plugging  airway  clearance/  acapella  RAAD, resolved/    follow  creatinine   spoke with  daughter       per  daughter/ pts  dr little  in [facility] [place] / dr bonner,  [facility] [place]  PT eval    < from: CT Chest No Cont (01.08.20 @ 12:38) >  IMPRESSION:   Small bilateral pleural effusions.   Areas of mucous plugging in the right middle and bilateral lower lobes with associated atelectasis.  < end of copied text >     < from: Transthoracic Echocardiogram (12.24.19 @ 09:49) >   Mild mitral regurgitation.  2. Calcified trileaflet aortic valve with decreased  opening. Peak transaortic valve gradient equals 34 mm Hg,  mean transaortic valve gradient equals 22 mm Hg, estimated  aortic valve area equals 1.3 sqcm (by continuity equation),  aortic valve velocity time integral equals 56 cm,  consistent with moderate to severe aortic stenosis  (severity may be underestimated). Mild aortic  regurgitation.  3. Severely dilated left atrium.  LA volume index = 75  cc/m2.  4. Increased relative wall thickness with normal left  ventricular mass index, consistent with concentric left  ventricular remodeling.  5. Normal left ventricular systolic function. No segmental  wall motion abnormalities. Septal flattening consistent  with right ventricular overload. Septal motion consistent  with conduction abnormality or pacing.  6. Increased E/e'  is consistent with elevated left  ventricular filling pressure.  7. Severe right atrial enlargement.  8. Right ventricular enlargement with decreased right  ventricular systolic function. A device wire is noted in  the right heart.  9. Malcoaptation of the tricuspid valve leaflets. Severe  tricuspid regurgitation.    < end of copied text >     < from: CT Head No Cont (12.19.19 @ 21:11) >  IMPRESSION: No acute intracranial hemorrhage.  Unchanged appearing right occipital convexity calcified/ossified meningioma.  Similar-appearing microvascular disease.  < end of copied text >     < from: CT Angio Abdomen and Pelvis w/ IV Cont (12.19.19 @ 17:30) >  BONES: Acute left seventh through 10th rib fractures. Age-indeterminate T6 compression fracture. Spinal degenerative changes. Mild retrolisthesis of L1 on L2 and mild anterolisthesis of L4 and L5.  IMPRESSION: Evidence of mild peripheral interstitial pulmonary edema.  Acute left seventh through 10th rib fractures.  OLGA SADLER M.D., RADIOLOGY RESIDENT 89 yo F      with   h/o      DVT.  A FIB on Coumadin,     SSX,  s/p   PPM .    HTN,  hypothyroid  old  menigioma on ct.        recne  admission on 12/ 19,  for,  from mlple rib fx's/  Left  7 to  10 th  ribs. 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 on Coumadin.  daily  inr  echo,  12/24/ 19,   mod  to severe   AS/  severe TR/  RV  dysfunction/ high LV filing pressure    on  cardizem/ hydralazine/bumex/  at  n home /    iv  bumex/     still with  tachypnea. , has impeoved    AFIB,      coumadin per  inr/ pending   s/p  acute resp failure on 1/ 7, / needing bipap,    ct  chest,   mucous plugging  airway  clearance/  acapella  RAAD, resolved/    follow  creatinine   spoke with  daughter  pt  on bipap/  decompensates/ tachypneic,   when taken off  even  for short periods  awaiting family meeting with  paliative care       per  daughter/ pts  dr little  in [facility] / dr bonner,  [facility]  PT eval    < from: CT Chest No Cont (01.08.20 @ 12:38) >  IMPRESSION:   Small bilateral pleural effusions.   Areas of mucous plugging in the right middle and bilateral lower lobes with associated atelectasis.  < end of copied text >     < from: Transthoracic Echocardiogram (12.24.19 @ 09:49) >   Mild mitral regurgitation.  2. Calcified trileaflet aortic valve with decreased  opening. Peak transaortic valve gradient equals 34 mm Hg,  mean transaortic valve gradient equals 22 mm Hg, estimated  aortic valve area equals 1.3 sqcm (by continuity equation),  aortic valve velocity time integral equals 56 cm,  consistent with moderate to severe aortic stenosis  (severity may be underestimated). Mild aortic  regurgitation.  3. Severely dilated left atrium.  LA volume index = 75  cc/m2.  4. Increased relative wall thickness with normal left  ventricular mass index, consistent with concentric left  ventricular remodeling.  5. Normal left ventricular systolic function. No segmental  wall motion abnormalities. Septal flattening consistent  with right ventricular overload. Septal motion consistent  with conduction abnormality or pacing.  6. Increased E/e'  is consistent with elevated left  ventricular filling pressure.  7. Severe right atrial enlargement.  8. Right ventricular enlargement with decreased right  ventricular systolic function. A device wire is noted in  the right heart.  9. Malcoaptation of the tricuspid valve leaflets. Severe  tricuspid regurgitation.    < end of copied text >     < from: CT Head No Cont (12.19.19 @ 21:11) >  IMPRESSION: No acute intracranial hemorrhage.  Unchanged appearing right occipital convexity calcified/ossified meningioma.  Similar-appearing microvascular disease.  < end of copied text >     < from: CT Angio Abdomen and Pelvis w/ IV Cont (12.19.19 @ 17:30) >  BONES: Acute left seventh through 10th rib fractures. Age-indeterminate T6 compression fracture. Spinal degenerative changes. Mild retrolisthesis of L1 on L2 and mild anterolisthesis of L4 and L5.  IMPRESSION: Evidence of mild peripheral interstitial pulmonary edema.  Acute left seventh through 10th rib fractures.  OLGA SADLER M.D., RADIOLOGY RESIDENT 89 yo F      with   h/o      DVT.  A FIB on Coumadin,     SSX,  s/p   PPM .    HTN,  hypothyroid  old  menigioma on ct.        recne  admission on 12/ 19,  for,  from mlple rib fx's/  Left  7 to  10 th  ribs. 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 on Coumadin.  daily  inr  echo,  12/24/ 19,   mod  to severe   AS/  severe TR/  RV  dysfunction/ high LV filing pressure    on  cardizem/ hydralazine/bumex/  at  n home /    iv  bumex/     still with  tachypnea. , has impeoved    AFIB,      coumadin per  inr/ pending   s/p  acute resp failure on 1/ 7, / needing bipap,    ct  chest,   mucous plugging  airway  clearance/  acapella  RAAD, resolved/    follow  creatinine   spoke with  daughter and  son  pt  on bipap/  decompensates/ tachypneic,   when taken off  even  for short periods  seen by pulm, no role for steroids  awaiting family meeting with  palliative  care       per  daughter/ pts  dr little  in [facility] / dr bonner,  [facility]  PT eval    < from: CT Chest No Cont (01.08.20 @ 12:38) >  IMPRESSION:   Small bilateral pleural effusions.   Areas of mucous plugging in the right middle and bilateral lower lobes with associated atelectasis.  < end of copied text >     < from: Transthoracic Echocardiogram (12.24.19 @ 09:49) >   Mild mitral regurgitation.  2. Calcified trileaflet aortic valve with decreased  opening. Peak transaortic valve gradient equals 34 mm Hg,  mean transaortic valve gradient equals 22 mm Hg, estimated  aortic valve area equals 1.3 sqcm (by continuity equation),  aortic valve velocity time integral equals 56 cm,  consistent with moderate to severe aortic stenosis  (severity may be underestimated). Mild aortic  regurgitation.  3. Severely dilated left atrium.  LA volume index = 75  cc/m2.  4. Increased relative wall thickness with normal left  ventricular mass index, consistent with concentric left  ventricular remodeling.  5. Normal left ventricular systolic function. No segmental  wall motion abnormalities. Septal flattening consistent  with right ventricular overload. Septal motion consistent  with conduction abnormality or pacing.  6. Increased E/e'  is consistent with elevated left  ventricular filling pressure.  7. Severe right atrial enlargement.  8. Right ventricular enlargement with decreased right  ventricular systolic function. A device wire is noted in  the right heart.  9. Malcoaptation of the tricuspid valve leaflets. Severe  tricuspid regurgitation.    < end of copied text >     < from: CT Head No Cont (12.19.19 @ 21:11) >  IMPRESSION: No acute intracranial hemorrhage.  Unchanged appearing right occipital convexity calcified/ossified meningioma.  Similar-appearing microvascular disease.  < end of copied text >     < from: CT Angio Abdomen and Pelvis w/ IV Cont (12.19.19 @ 17:30) >  BONES: Acute left seventh through 10th rib fractures. Age-indeterminate T6 compression fracture. Spinal degenerative changes. Mild retrolisthesis of L1 on L2 and mild anterolisthesis of L4 and L5.  IMPRESSION: Evidence of mild peripheral interstitial pulmonary edema.  Acute left seventh through 10th rib fractures.  OLGA SADLER M.D., RADIOLOGY RESIDENT

## 2020-01-09 NOTE — CHART NOTE - NSCHARTNOTEFT_GEN_A_CORE
Dermatology Brief Note    HPI: Ms. Cinthia Hyatt is a 89 year old woman PMH CHF, afib, HTN admitted for acute on chronic heart failure. Dermatology is consulted for lesion on chest chest x several months. Family says lesions has been enlarging and spontaneously bleeding. Asymptomatic. Never been treated. No previous hx of skin cancer.     PHYSICAL EXAM:  Vital Signs Last 24 Hrs  T(C): 36.4 (09 Jan 2020 13:21), Max: 36.6 (08 Jan 2020 17:29)  T(F): 97.5 (09 Jan 2020 13:21), Max: 97.9 (08 Jan 2020 17:29)  HR: 63 (09 Jan 2020 13:21) (63 - 74)  BP: 152/80 (09 Jan 2020 13:21) (120/54 - 152/80)  BP(mean): --  RR: 20 (09 Jan 2020 13:21) (18 - 20)  SpO2: 99% (09 Jan 2020 13:21) (88% - 99%)    Skin exam notable for:  The patient was alert, well nourished, and in no apparent distress. Oropharynx showed no ulcerations. There was no visible lymphadenopathy. Conjunctiva were non-injected. There was no clubbing or edema of extremities.    The scalp, hair, face, eyebrows, lips, oropharynx , neck, chest, back, buttocks, extremities X 4, hands, feet, nails were examined. There was no hyperhidrosis or bromhidrosis.     The following lesions are noted:   erythematous papulonodule with active bleeding on central chest    ASSESSMENT/PLAN:      Discussed with primary team.  Discussed with attending,  ***. Formal rounds to be conducted on.... Will update assessment and plan that time.    aKy Sandy MD  PGY3, Dermatology Dermatology Brief Note    HPI: Ms. Cinthia Hyatt is a 89 year old woman PMH CHF, afib, HTN admitted for acute on chronic heart failure. Dermatology is consulted for lesion on chest chest x several months. Family says lesions has been enlarging and spontaneously bleeding. Asymptomatic. Never been treated. No previous hx of skin cancer.     PHYSICAL EXAM:  Vital Signs Last 24 Hrs  T(C): 36.4 (09 Jan 2020 13:21), Max: 36.6 (08 Jan 2020 17:29)  T(F): 97.5 (09 Jan 2020 13:21), Max: 97.9 (08 Jan 2020 17:29)  HR: 63 (09 Jan 2020 13:21) (63 - 74)  BP: 152/80 (09 Jan 2020 13:21) (120/54 - 152/80)  BP(mean): --  RR: 20 (09 Jan 2020 13:21) (18 - 20)  SpO2: 99% (09 Jan 2020 13:21) (88% - 99%)    Skin exam notable for:  The patient was alert, well nourished, and in no apparent distress. Oropharynx showed no ulcerations. There was no visible lymphadenopathy. Conjunctiva were non-injected. There was no clubbing or edema of extremities.    The scalp, hair, face, eyebrows, lips, oropharynx , neck, chest, back, buttocks, extremities X 4, hands, feet, nails were examined. There was no hyperhidrosis or bromhidrosis.     The following lesions are noted:   erythematous papulonodule with active bleeding on central chest    ASSESSMENT/PLAN:  1. Neoplasm of uncertain behavior of skin  on chest  Ddx includes nonmelanoma skin cancer vs pyogenic granuloma vs melanoma  - Plan to perform shave biopsy on 1/10     Discussed with primary team.  Discussed with attending, Dr. Yuan. Formal rounds to be conducted on 1/10/2020. Will update assessment and plan that time.    Kay Sandy MD  PGY3, Dermatology Dermatology Brief Note    HPI: Ms. Cinthia Hyatt is a 89 year old woman PMH CHF, afib, HTN admitted for acute on chronic heart failure. Dermatology is consulted for lesion on chest chest x several months. Family says lesions has been enlarging and spontaneously bleeding. Asymptomatic. Never been treated. No previous hx of skin cancer.     PHYSICAL EXAM:  Vital Signs Last 24 Hrs  T(C): 36.4 (09 Jan 2020 13:21), Max: 36.6 (08 Jan 2020 17:29)  T(F): 97.5 (09 Jan 2020 13:21), Max: 97.9 (08 Jan 2020 17:29)  HR: 63 (09 Jan 2020 13:21) (63 - 74)  BP: 152/80 (09 Jan 2020 13:21) (120/54 - 152/80)  BP(mean): --  RR: 20 (09 Jan 2020 13:21) (18 - 20)  SpO2: 99% (09 Jan 2020 13:21) (88% - 99%)    Skin exam notable for:  The patient was alert, well nourished, and in no apparent distress. Oropharynx showed no ulcerations. There was no visible lymphadenopathy. Conjunctiva were non-injected. There was no clubbing or edema of extremities.    The scalp, hair, face, eyebrows, lips, oropharynx , neck, chest, back, buttocks, extremities X 4, hands, feet, nails were examined. There was no hyperhidrosis or bromhidrosis.     The following lesions are noted:   erythematous papulonodule with active bleeding on central chest    ASSESSMENT/PLAN:  1. Neoplasm of uncertain behavior of skin  on chest  Ddx includes nonmelanoma skin cancer vs pyogenic granuloma vs melanoma  - Discussed that biopsy would be needed for diagnosis, family prefers to first have family meeting with palliative care to discuss GOC which is scheduled for 1PM tomorrow    Discussed with primary team.  Discussed with attending, Dr. Yuan. Formal rounds to be conducted on 1/10/2020. Will update assessment and plan that time.    Kay Sandy MD  PGY3, Dermatology

## 2020-01-09 NOTE — PROGRESS NOTE ADULT - ASSESSMENT
89 yo F with a h/o HFpEF, Afib s/p PPM, severe AS and TR, e/o RV overload on TTE 12/24  HTN, EMILY diagnosed years ago (not on therapy), recent hospitalization for fractured ribs after coughing presents with from nursing facility with worsening SOB x 3days. Patient's two daughters and son-in law at bedside. State that prior to her admission in December was very active, without significant pulmonary history. Was coughing and fractured her left 7-10 ribs (has osteoporosis), admitted to the hospital  12/19/19, discharged to rehab. Now with progressive dyspnea, LE edema, proBNP of 5543 to 8930, TTe with severe AS and TR. Being diuresed, BP controlled. On 1/7, experienced increased work of breathing and placed on bilevel 12/6. Bumex increased to 1 mg IV BID.   Currently seen eating lunch, sitting up in bed, dyspneic. +cough, no sputum production, denies chest pain. 98% on 4 LNC    A/P:   1. Multifactorial dyspnea and hypoxemia- RV failure, severe AS, rib fractures, deconditioning, mucus plugging/atelectasis  Would send RVP to r/o influenza/other viral infection as a contributor  c/w cardiac optimization- rate control, diuresis per cardiology, monitor ins/outs  c/w Duonebs Q6 hours, will add hypersal, acapella to be used after for airway clearance and suctioning,(I have ordered) incentive spirometer - provided with new spirometer and reviewed technique. Discussed adding inhaled steroids - family would like to hold off at this time.   Bilevel 12/6 as needed and when sleeping     2. Rib fractures - pain control- tylenol, tramadol  Incentive spirometer, acapella to aid in airway clearance    3. DVT ppx  Given multiple advanced comorbidities, advanced age, Palliative consulted for GOC discussion      Attending Attestation:   I was physically present for the key portions of the evaluation and management (E/M) service provided.  I agree with the above history, physical, and plan which I have reviewed and edited where appropriate.     45 minutes spent on total encounter; more than 50% of the visit was spent counseling and/or coordinating care by the attending physician.     Plan discussed with patient and family at bedside.

## 2020-01-09 NOTE — PROGRESS NOTE ADULT - SUBJECTIVE AND OBJECTIVE BOX
[facility] - Division of Pulmonary, Critical Care and Sleep Medicine   Please call [phone] between 8am-pm weekdays, [phone] after hours and weekends    Interval Events: No events overnight, remains dyspneic, coughing - unable to expectorate. Using Bilevel intermittently. Daughters at bedside    REVIEW OF SYSTEMS:  CV: [- ] chest pain   Resp: [+ ] cough [+ ] shortness of breath   [ x] All other systems negative  [ ] Unable to assess ROS because ________    OBJECTIVE:  ICU Vital Signs Last 24 Hrs  T(C): 36.4 (09 Jan 2020 13:21), Max: 36.6 (08 Jan 2020 17:29)  T(F): 97.5 (09 Jan 2020 13:21), Max: 97.9 (08 Jan 2020 17:29)  HR: 63 (09 Jan 2020 13:21) (63 - 74)  BP: 152/80 (09 Jan 2020 13:21) (120/54 - 152/80)  BP(mean): --  ABP: --  ABP(mean): --  RR: 20 (09 Jan 2020 13:21) (18 - 20)  SpO2: 99% (09 Jan 2020 13:21) (88% - 99%)        01-08 @ 07:01 - 01-09 @ 07:00  --------------------------------------------------------  IN: 970 mL / OUT: 900 mL / NET: 70 mL    01-09 @ 07:01 - 01-09 @ 15:43  --------------------------------------------------------  IN: 525 mL / OUT: 400 mL / NET: 125 mL      CAPILLARY BLOOD GLUCOSE          PHYSICAL EXAM:  General: mild distress, tachypneic  HEENT: NC/AT  Neck: supple  Respiratory:  bilateral rhonchi  Cardiovascular:  RRR, no m/r/g  Abdomen: soft, NT/ND, +BS  Extremities: no clubbing, cyanosis or edema, warm  Skin: no rash  Neurological: AAOx3, non focal exam    HOSPITAL MEDICATIONS:  MEDICATIONS  (STANDING):  albuterol/ipratropium for Nebulization 3 milliLiter(s) Nebulizer every 6 hours  bisacodyl 5 milliGRAM(s) Oral at bedtime  buMETAnide Injectable 1 milliGRAM(s) IV Push daily  chlorhexidine 2% Cloths 1 Application(s) Topical daily  diltiazem    Tablet 60 milliGRAM(s) Oral every 6 hours  hydrALAZINE 25 milliGRAM(s) Oral two times a day  isosorbide   mononitrate ER Tablet (IMDUR) 30 milliGRAM(s) Oral daily  levalbuterol Inhalation 0.63 milliGRAM(s) Inhalation every 6 hours  levothyroxine 88 MICROGram(s) Oral daily  senna 2 Tablet(s) Oral at bedtime  warfarin 6 milliGRAM(s) Oral once    MEDICATIONS  (PRN):  acetaminophen   Tablet .. 650 milliGRAM(s) Oral every 8 hours PRN Mild Pain (1 - 3)  HYDROmorphone  Injectable 0.2 milliGRAM(s) IV Push every 4 hours PRN dyspnea  traMADol 25 milliGRAM(s) Oral two times a day PRN Severe Pain (7 - 10)      LABS:                        10.3   9.70  )-----------( 296      ( 08 Jan 2020 08:53 )             31.9     Hgb Trend: 10.3<--, 10.4<--, 11.0<--  01-09    139  |  97  |  39<H>  ----------------------------<  85  4.0   |  26  |  1.11    Ca    9.0      09 Jan 2020 07:15      Creatinine Trend: 1.11<--, 1.61<--, 1.26<--, 1.30<--, 1.27<--, 1.40<--  PT/INR - ( 09 Jan 2020 08:25 )   PT: 18.0 sec;   INR: 1.57 ratio         PTT - ( 09 Jan 2020 08:25 )  PTT:32.3 sec          MICROBIOLOGY: none    RADIOLOGY:  [x ] Reviewed and interpreted by me  < from: CT Chest No Cont (01.08.20 @ 12:38) >  EXAM:  CT CHEST                            PROCEDURE DATE:  01/08/2020            INTERPRETATION:  CLINICAL INFORMATION: Shortness of breath    COMPARISON: CT chest 12/19/2019    PROCEDURE:   CT of the Chest was performed without intravenous contrast.  Sagittal and coronal reformats were performed.    FINDINGS:    No axillary or mediastinal adenopathy.    Calcifications of the thoracic aorta. The heart is enlarged. Mitral annular, aortic valvular, and coronary artery calcifications. No pericardial effusion. Left-sided dual-lead cardiac pacer in place.    No endobronchial lesion. Small bilateral pleural effusions. Partial opacification of the right middle and bilateral lower lobe bronchi, likely mucous plugging. Bibasilar areas of atelectasis. There is also an area of left upper lobe atelectasis and/or scarring. No pneumothorax.    Below the diaphragm, there is a subcentimeter left hepatic hypodense focus and subcentimeter renal lesions, too small characterize.    Evaluation of the osseous structures demonstrate degenerative changes, body height loss of a few thoracic vertebral bodies, and chronic left-sided rib fractures.    IMPRESSION:     Small bilateral pleural effusions.     Areas of mucous plugging in the right middle and bilateral lower lobes with associated atelectasis.      AJITH SHAW M.D., RADIOLOGY RESIDENT  This document has been electronically signed.  PIERO NANCE M.D., ATTENDING RADIOLOGIST  This document has been electronically signed. Jan 8 2020  4:28PM        < end of copied text >

## 2020-01-10 LAB
INR BLD: 1.73 RATIO — HIGH (ref 0.88–1.16)
PROTHROM AB SERPL-ACNC: 20.2 SEC — HIGH (ref 10–13.1)

## 2020-01-10 PROCEDURE — 99233 SBSQ HOSP IP/OBS HIGH 50: CPT

## 2020-01-10 PROCEDURE — 99232 SBSQ HOSP IP/OBS MODERATE 35: CPT

## 2020-01-10 PROCEDURE — 99221 1ST HOSP IP/OBS SF/LOW 40: CPT | Mod: GC

## 2020-01-10 PROCEDURE — 99497 ADVNCD CARE PLAN 30 MIN: CPT | Mod: 25

## 2020-01-10 RX ORDER — BUDESONIDE, MICRONIZED 100 %
0.5 POWDER (GRAM) MISCELLANEOUS
Refills: 0 | Status: DISCONTINUED | OUTPATIENT
Start: 2020-01-10 | End: 2020-01-14

## 2020-01-10 RX ORDER — WARFARIN SODIUM 2.5 MG/1
6 TABLET ORAL ONCE
Refills: 0 | Status: COMPLETED | OUTPATIENT
Start: 2020-01-10 | End: 2020-01-10

## 2020-01-10 RX ORDER — ENOXAPARIN SODIUM 100 MG/ML
40 INJECTION SUBCUTANEOUS ONCE
Refills: 0 | Status: COMPLETED | OUTPATIENT
Start: 2020-01-10 | End: 2020-01-10

## 2020-01-10 RX ADMIN — Medication 0.5 MILLIGRAM(S): at 17:55

## 2020-01-10 RX ADMIN — Medication 25 MILLIGRAM(S): at 06:28

## 2020-01-10 RX ADMIN — Medication 650 MILLIGRAM(S): at 09:34

## 2020-01-10 RX ADMIN — LEVALBUTEROL 0.63 MILLIGRAM(S): 1.25 SOLUTION, CONCENTRATE RESPIRATORY (INHALATION) at 06:26

## 2020-01-10 RX ADMIN — ENOXAPARIN SODIUM 40 MILLIGRAM(S): 100 INJECTION SUBCUTANEOUS at 18:30

## 2020-01-10 RX ADMIN — Medication 200 MILLIGRAM(S): at 06:24

## 2020-01-10 RX ADMIN — Medication 25 MILLIGRAM(S): at 18:29

## 2020-01-10 RX ADMIN — SODIUM CHLORIDE 5 MILLILITER(S): 9 INJECTION INTRAMUSCULAR; INTRAVENOUS; SUBCUTANEOUS at 03:42

## 2020-01-10 RX ADMIN — Medication 60 MILLIGRAM(S): at 18:29

## 2020-01-10 RX ADMIN — HYDROMORPHONE HYDROCHLORIDE 0.2 MILLIGRAM(S): 2 INJECTION INTRAMUSCULAR; INTRAVENOUS; SUBCUTANEOUS at 06:27

## 2020-01-10 RX ADMIN — Medication 3 MILLILITER(S): at 17:55

## 2020-01-10 RX ADMIN — Medication 650 MILLIGRAM(S): at 21:22

## 2020-01-10 RX ADMIN — Medication 200 MILLIGRAM(S): at 18:29

## 2020-01-10 RX ADMIN — Medication 650 MILLIGRAM(S): at 09:04

## 2020-01-10 RX ADMIN — Medication 3 MILLILITER(S): at 06:23

## 2020-01-10 RX ADMIN — SODIUM CHLORIDE 5 MILLILITER(S): 9 INJECTION INTRAMUSCULAR; INTRAVENOUS; SUBCUTANEOUS at 06:25

## 2020-01-10 RX ADMIN — Medication 650 MILLIGRAM(S): at 20:52

## 2020-01-10 RX ADMIN — LEVALBUTEROL 0.63 MILLIGRAM(S): 1.25 SOLUTION, CONCENTRATE RESPIRATORY (INHALATION) at 02:56

## 2020-01-10 RX ADMIN — CHLORHEXIDINE GLUCONATE 1 APPLICATION(S): 213 SOLUTION TOPICAL at 13:40

## 2020-01-10 RX ADMIN — ISOSORBIDE MONONITRATE 30 MILLIGRAM(S): 60 TABLET, EXTENDED RELEASE ORAL at 14:01

## 2020-01-10 RX ADMIN — LEVALBUTEROL 0.63 MILLIGRAM(S): 1.25 SOLUTION, CONCENTRATE RESPIRATORY (INHALATION) at 14:03

## 2020-01-10 RX ADMIN — Medication 200 MILLIGRAM(S): at 14:02

## 2020-01-10 RX ADMIN — Medication 3 MILLILITER(S): at 02:57

## 2020-01-10 RX ADMIN — LEVALBUTEROL 0.63 MILLIGRAM(S): 1.25 SOLUTION, CONCENTRATE RESPIRATORY (INHALATION) at 17:55

## 2020-01-10 RX ADMIN — SODIUM CHLORIDE 5 MILLILITER(S): 9 INJECTION INTRAMUSCULAR; INTRAVENOUS; SUBCUTANEOUS at 14:03

## 2020-01-10 RX ADMIN — Medication 60 MILLIGRAM(S): at 06:24

## 2020-01-10 RX ADMIN — SODIUM CHLORIDE 5 MILLILITER(S): 9 INJECTION INTRAMUSCULAR; INTRAVENOUS; SUBCUTANEOUS at 17:56

## 2020-01-10 RX ADMIN — WARFARIN SODIUM 6 MILLIGRAM(S): 2.5 TABLET ORAL at 22:43

## 2020-01-10 RX ADMIN — HYDROMORPHONE HYDROCHLORIDE 0.2 MILLIGRAM(S): 2 INJECTION INTRAMUSCULAR; INTRAVENOUS; SUBCUTANEOUS at 06:57

## 2020-01-10 RX ADMIN — BUMETANIDE 1 MILLIGRAM(S): 0.25 INJECTION INTRAMUSCULAR; INTRAVENOUS at 06:23

## 2020-01-10 RX ADMIN — Medication 3 MILLILITER(S): at 14:02

## 2020-01-10 RX ADMIN — Medication 88 MICROGRAM(S): at 06:26

## 2020-01-10 NOTE — PROGRESS NOTE ADULT - SUBJECTIVE AND OBJECTIVE BOX
has   sob , but  less    REVIEW OF SYSTEMS:  GEN: no fever,    no chills  RESP:  SOB,   no cough  CVS: no chest pain,   no palpitations  GI: no abdominal pain,   no nausea,   no vomiting,   no constipation,   no diarrhea  : no dysuria,   no frequency  NEURO: no headache,   no dizziness  PSYCH: no depression,   not anxious  Derm : no rash    MEDICATIONS  (STANDING):  albuterol/ipratropium for Nebulization 3 milliLiter(s) Nebulizer every 6 hours  bisacodyl 5 milliGRAM(s) Oral at bedtime  buMETAnide Injectable 1 milliGRAM(s) IV Push daily  chlorhexidine 2% Cloths 1 Application(s) Topical daily  diltiazem    Tablet 60 milliGRAM(s) Oral every 6 hours  guaiFENesin   Syrup  (Sugar-Free) 200 milliGRAM(s) Oral every 6 hours  hydrALAZINE 25 milliGRAM(s) Oral two times a day  isosorbide   mononitrate ER Tablet (IMDUR) 30 milliGRAM(s) Oral daily  levalbuterol Inhalation 0.63 milliGRAM(s) Inhalation every 6 hours  levothyroxine 88 MICROGram(s) Oral daily  senna 2 Tablet(s) Oral at bedtime  sodium chloride 7% Inhalation 5 milliLiter(s) Inhalation every 6 hours    MEDICATIONS  (PRN):  acetaminophen   Tablet .. 650 milliGRAM(s) Oral every 8 hours PRN Mild Pain (1 - 3)  HYDROmorphone  Injectable 0.2 milliGRAM(s) IV Push every 4 hours PRN dyspnea  traMADol 25 milliGRAM(s) Oral two times a day PRN Severe Pain (7 - 10)      Vital Signs Last 24 Hrs  T(C): 36.4 (10 Rodrigo 2020 00:50), Max: 36.6 (09 Jan 2020 19:24)  T(F): 97.6 (10 Rodrigo 2020 00:50), Max: 97.8 (09 Jan 2020 19:24)  HR: 70 (10 Rodrigo 2020 06:29) (59 - 73)  BP: 155/89 (10 Rodrigo 2020 00:50) (110/50 - 155/89)  BP(mean): --  RR: 18 (10 Rodrigo 2020 00:50) (18 - 20)  SpO2: 100% (10 Rodrigo 2020 06:29) (88% - 100%)  CAPILLARY BLOOD GLUCOSE        I&O's Summary    09 Jan 2020 07:01  -  10 Rodrigo 2020 07:00  --------------------------------------------------------  IN: 865 mL / OUT: 900 mL / NET: -35 mL        PHYSICAL EXAM:  HEAD:  Atraumatic, Normocephalic  NECK: Supple, No   JVD  CHEST/LUNG:   no     rales,     no,    rhonchi  HEART: Regular rate and rhythm;         murmur  ABDOMEN: Soft, Nontender, ;   EXTREMITIES:   no     edema  NEUROLOGY:  alert    LABS:                        10.3   9.70  )-----------( 296      ( 08 Jan 2020 08:53 )             31.9     01-09    139  |  97  |  39<H>  ----------------------------<  85  4.0   |  26  |  1.11    Ca    9.0      09 Jan 2020 07:15      PT/INR - ( 09 Jan 2020 08:25 )   PT: 18.0 sec;   INR: 1.57 ratio         PTT - ( 09 Jan 2020 08:25 )  PTT:32.3 sec                Thyroid Stimulating Hormone, Serum: 6.79 uIU/mL (12-20 @ 09:12)          Consultant(s) Notes Reviewed:      Care Discussed with Consultants/Other Providers:

## 2020-01-10 NOTE — PROGRESS NOTE ADULT - SUBJECTIVE AND OBJECTIVE BOX
CARDIOLOGY     PROGRESS  NOTE   ________________________________________________    CHIEF COMPLAINT:Patient is a 89y old  Female who presents with a chief complaint of sob (10 Rodrigo 2020 08:31)  doing better.  	  REVIEW OF SYSTEMS:  CONSTITUTIONAL: No fever, weight loss, or fatigue  EYES: No eye pain, visual disturbances, or discharge  ENT:  No difficulty hearing, tinnitus, vertigo; No sinus or throat pain  NECK: No pain or stiffness  RESPIRATORY: No cough, wheezing, chills or hemoptysis; decrease  Shortness of Breath  CARDIOVASCULAR: No chest pain, palpitations, passing out, dizziness, or leg swelling  GASTROINTESTINAL: No abdominal or epigastric pain. No nausea, vomiting, or hematemesis; No diarrhea or constipation. No melena or hematochezia.  GENITOURINARY: No dysuria, frequency, hematuria, or incontinence  NEUROLOGICAL: No headaches, memory loss, loss of strength, numbness, or tremors  SKIN: No itching, burning, rashes, or lesions   LYMPH Nodes: No enlarged glands  ENDOCRINE: No heat or cold intolerance; No hair loss  MUSCULOSKELETAL: No joint pain or swelling; No muscle, back, or extremity pain  PSYCHIATRIC: No depression, anxiety, mood swings, or difficulty sleeping  HEME/LYMPH: No easy bruising, or bleeding gums  ALLERGY AND IMMUNOLOGIC: No hives or eczema	    [ ] All others negative	  [ ] Unable to obtain    PHYSICAL EXAM:  T(C): 36.4 (01-10-20 @ 00:50), Max: 36.6 (01-09-20 @ 19:24)  HR: 70 (01-10-20 @ 06:29) (59 - 73)  BP: 155/89 (01-10-20 @ 00:50) (110/50 - 155/89)  RR: 18 (01-10-20 @ 00:50) (18 - 20)  SpO2: 100% (01-10-20 @ 06:29) (91% - 100%)  Wt(kg): --  I&O's Summary    09 Jan 2020 07:01  -  10 Rodrigo 2020 07:00  --------------------------------------------------------  IN: 865 mL / OUT: 900 mL / NET: -35 mL    10 Rodrigo 2020 07:01  -  10 Rodrigo 2020 10:08  --------------------------------------------------------  IN: 200 mL / OUT: 200 mL / NET: 0 mL        Appearance: Normal	  HEENT:   Normal oral mucosa, PERRL, EOMI	  Lymphatic: No lymphadenopathy  Cardiovascular: Normal S1 S2, No JVD,+ murmurs, No edema  Respiratory: rales  Psychiatry: A & O x 3, Mood & affect appropriate  Gastrointestinal:  Soft, Non-tender, + BS	  Skin: No rashes, No ecchymoses, No cyanosis	  Neurologic: Non-focal  Extremities: Normal range of motion, No clubbing, cyanosis or edema  Vascular: Peripheral pulses palpable 2+ bilaterally    MEDICATIONS  (STANDING):  albuterol/ipratropium for Nebulization 3 milliLiter(s) Nebulizer every 6 hours  bisacodyl 5 milliGRAM(s) Oral at bedtime  buMETAnide Injectable 1 milliGRAM(s) IV Push daily  chlorhexidine 2% Cloths 1 Application(s) Topical daily  diltiazem    Tablet 60 milliGRAM(s) Oral every 6 hours  enoxaparin Injectable 40 milliGRAM(s) SubCutaneous once  guaiFENesin   Syrup  (Sugar-Free) 200 milliGRAM(s) Oral every 6 hours  hydrALAZINE 25 milliGRAM(s) Oral two times a day  isosorbide   mononitrate ER Tablet (IMDUR) 30 milliGRAM(s) Oral daily  levalbuterol Inhalation 0.63 milliGRAM(s) Inhalation every 6 hours  levothyroxine 88 MICROGram(s) Oral daily  senna 2 Tablet(s) Oral at bedtime  sodium chloride 7% Inhalation 5 milliLiter(s) Inhalation every 6 hours  warfarin 6 milliGRAM(s) Oral once      TELEMETRY: 	    ECG:  	  RADIOLOGY:  OTHER: 	  	  LABS:	 	    CARDIAC MARKERS:            01-09    139  |  97  |  39<H>  ----------------------------<  85  4.0   |  26  |  1.11    Ca    9.0      09 Jan 2020 07:15      proBNP: Serum Pro-Brain Natriuretic Peptide: 8930 pg/mL (01-05 @ 15:05)  Serum Pro-Brain Natriuretic Peptide: 5543 pg/mL (12-19 @ 13:19)    Lipid Profile:   HgA1c:   TSH: Thyroid Stimulating Hormone, Serum: 6.79 uIU/mL (12-20 @ 09:12)    PT/INR - ( 10 Rodrigo 2020 08:56 )   PT: 20.2 sec;   INR: 1.73 ratio         PTT - ( 09 Jan 2020 08:25 )  PTT:32.3 sec    1. Multifactorial dyspnea and hypoxemia- RV failure, severe AS, rib fractures, deconditioning, mucus plugging/atelectasis  Would send RVP to r/o influenza/other viral infection as a contributor  c/w cardiac optimization- rate control, diuresis per cardiology, monitor ins/outs  c/w Duonebs Q6 hours, will add hypersal, acapella to be used after for airway clearance and suctioning,(I have ordered) incentive spirometer - provided with new spirometer and reviewed technique. Discussed adding inhaled steroids - family would like to hold off at this time.   Bilevel 12/6 as needed and when sleeping     2. Rib fractures - pain control- tylenol, tramadol  Incentive spirometer, acapella to aid in airway clearance    Assessment and plan  ---------------------------  89y Female complaining of difficulty breathing.	  5F pmhx of CHF, Afib, Pacemaker, HTN presents from nursing facility for SOB and course breath sounds. As per EMS she was on 2L NC, but O2 tank ran out. Patient AOx3, states that the cough and SOB started 3 days ago. Denies fevers, chills, N/V/D, chest pain.  pt was recently admitted with chf and was treated medically.  pt with sig valvular heart disease on medical therapy.  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  iv bumex  continue all cardiac meds  pt needs to wear her bipap at night in Rehab and home.  continue diuresis  add imdur 30 mg daily as tolerated  still with sob check ct chest no contrast r/o aspiration, ct noted  doing better  decrease bumex  pulmonary eval noted  ? adding  steroid and suction/ duoneb  ac keep ir 2-3

## 2020-01-10 NOTE — PROGRESS NOTE ADULT - ASSESSMENT
89F pmhx of CHF, pulm htn, on home o2, Afib, Pacemaker, HTN. P/w sob from rehab at [facility].  Likely due to acute on chronic CHF.  Palliative care called for GOC by dtr.

## 2020-01-10 NOTE — PROGRESS NOTE ADULT - ASSESSMENT
89 yo F with a h/o HFpEF, Afib s/p PPM, severe AS and TR, e/o RV overload on TTE 12/24  HTN, EMILY diagnosed years ago (not on therapy), recent hospitalization for fractured ribs after coughing presents with from nursing facility with worsening SOB x 3days. Patient's two daughters and son-in law at bedside. State that prior to her admission in December was very active, without significant pulmonary history. Was coughing and fractured her left 7-10 ribs (has osteoporosis), admitted to the hospital  12/19/19, discharged to rehab. Now with progressive dyspnea, LE edema, proBNP of 5543 to 8930, TTe with severe AS and TR. Being diuresed, BP controlled. On 1/7, experienced increased work of breathing and placed on bilevel 12/6. Bumex increased to 1 mg IV BID.   Currently seen eating lunch, sitting up in bed, dyspneic. +cough, no sputum production, denies chest pain. 98% on 4 LNC    A/P:   1. Multifactorial dyspnea and hypoxemia- RV failure, severe AS, rib fractures, deconditioning, mucus plugging/atelectasis  Would send RVP to r/o influenza/other viral infection as a contributor  c/w cardiac optimization- rate control, diuresis per cardiology, monitor ins/outs  c/w Duonebs Q6 hours, Budesonide nebs BID, hypersal, acapella to be used after for airway clearance and suctioning   Bilevel 12/6 as needed and when sleeping     2. Rib fractures - pain control- tylenol, tramadol  Incentive spirometer, acapella to aid in airway clearance- unfortunately, because of her rib fractures and high risk for additional trauma, tx such as cough assist device and chest vest are not options    3. DVT ppx    4. Dispo - Given multiple advanced comorbidities, advanced age, Palliative consulted and plan for transfer to the PCU for symptom management       Please call the answering service at [phone] with questions over the weekend.      Attending Attestation:   I was physically present for the key portions of the evaluation and management (E/M) service provided.  I agree with the above history, physical, and plan which I have reviewed and edited where appropriate.     35 minutes spent on total encounter; more than 50% of the visit was spent counseling and/or coordinating care by the attending physician.     Plan discussed with patient and family at bedside.

## 2020-01-10 NOTE — CONSULT NOTE ADULT - SUBJECTIVE AND OBJECTIVE BOX
HPI:  Ms. Cinthia Hyatt is a 89 year old woman PMH CHF, afib, HTN admitted for acute on chronic heart failure. Dermatology is consulted for lesion on chest chest x several months. Family says lesions has been enlarging and spontaneously bleeding. Asymptomatic. Never been treated. No previous hx of skin cancer.     PAST MEDICAL & SURGICAL HISTORY:  Atrial fibrillation  Hypothyroid  HTN (hypertension)  DVT (deep venous thrombosis)  Cardiac pacemaker      Review of Systems:  Constitutional: denies fevers, chills  HEENT: odynophagia or dysphagia  Cardiovascular: denies palpitations  Respiratory: denies SOB, wheezing  Gastrointestinal: denies N/V/D, abdominal pain  : denies dysuria  MSK: denies weakness, joint pain  Skin: denies new rashes or masses unless otherwise specified in hpi    MEDICATIONS  (STANDING):  albuterol/ipratropium for Nebulization 3 milliLiter(s) Nebulizer every 6 hours  bisacodyl 5 milliGRAM(s) Oral at bedtime  buMETAnide Injectable 1 milliGRAM(s) IV Push daily  chlorhexidine 2% Cloths 1 Application(s) Topical daily  diltiazem    Tablet 60 milliGRAM(s) Oral every 6 hours  enoxaparin Injectable 40 milliGRAM(s) SubCutaneous once  guaiFENesin   Syrup  (Sugar-Free) 200 milliGRAM(s) Oral every 6 hours  hydrALAZINE 25 milliGRAM(s) Oral two times a day  isosorbide   mononitrate ER Tablet (IMDUR) 30 milliGRAM(s) Oral daily  levalbuterol Inhalation 0.63 milliGRAM(s) Inhalation every 6 hours  levothyroxine 88 MICROGram(s) Oral daily  senna 2 Tablet(s) Oral at bedtime  sodium chloride 7% Inhalation 5 milliLiter(s) Inhalation every 6 hours  warfarin 6 milliGRAM(s) Oral once    MEDICATIONS  (PRN):  acetaminophen   Tablet .. 650 milliGRAM(s) Oral every 8 hours PRN Mild Pain (1 - 3)  HYDROmorphone  Injectable 0.2 milliGRAM(s) IV Push every 4 hours PRN dyspnea  traMADol 25 milliGRAM(s) Oral two times a day PRN Severe Pain (7 - 10)      Allergies    No Known Allergies    Intolerances        SOCIAL HISTORY: denies drug use    FAMILY HISTORY:      Vital Signs Last 24 Hrs  T(C): 36.4 (10 Rodrigo 2020 00:50), Max: 36.6 (09 Jan 2020 19:24)  T(F): 97.6 (10 Rodrigo 2020 00:50), Max: 97.8 (09 Jan 2020 19:24)  HR: 67 (10 Rodrigo 2020 10:45) (59 - 73)  BP: 155/89 (10 Rodrigo 2020 00:50) (110/50 - 155/89)  BP(mean): --  RR: 18 (10 Rodrigo 2020 00:50) (18 - 18)  SpO2: 99% (10 Rodrigo 2020 10:45) (91% - 100%)    Physical Exam:  The patient was alert and oriented X 3, well nourished, and in no apparent distress. Oropharynx showed no ulcerations. There was no visible lymphadenopathy. Conjunctiva were non-injected. There was no clubbing or edema of extremities.    The scalp, hair, face, eyebrows, lips, oropharynx , neck, chest, back, buttocks, extremities X 4, hands, feet, nails were examined. There was no hyperhidrosis or bromhidrosis.     The following lesions are noted:   erythematous papulonodule with active bleeding on central chest    LABS:    01-09    139  |  97  |  39<H>  ----------------------------<  85  4.0   |  26  |  1.11    Ca    9.0      09 Jan 2020 07:15      PT/INR - ( 10 Rodrigo 2020 08:56 )   PT: 20.2 sec;   INR: 1.73 ratio         PTT - ( 09 Jan 2020 08:25 )  PTT:32.3 sec      RADIOLOGY & ADDITIONAL STUDIES: no relevant studies HPI:  Ms. Cinthia Hyatt is a 89 year old woman PMH CHF, afib, HTN admitted for acute on chronic heart failure. Dermatology is consulted for lesion on chest chest x several months. Family says lesions has been enlarging and spontaneously bleeding. Asymptomatic. Never been treated. No previous hx of skin cancer.     PAST MEDICAL & SURGICAL HISTORY:  Atrial fibrillation  Hypothyroid  HTN (hypertension)  DVT (deep venous thrombosis)  Cardiac pacemaker      Review of Systems:  Constitutional: denies fevers, chills  HEENT: odynophagia or dysphagia  Cardiovascular: denies palpitations  Respiratory: denies SOB, wheezing  Gastrointestinal: denies N/V/D, abdominal pain  : denies dysuria  MSK: denies weakness, joint pain  Skin: denies new rashes or masses unless otherwise specified in hpi    MEDICATIONS  (STANDING):  albuterol/ipratropium for Nebulization 3 milliLiter(s) Nebulizer every 6 hours  bisacodyl 5 milliGRAM(s) Oral at bedtime  buMETAnide Injectable 1 milliGRAM(s) IV Push daily  chlorhexidine 2% Cloths 1 Application(s) Topical daily  diltiazem    Tablet 60 milliGRAM(s) Oral every 6 hours  enoxaparin Injectable 40 milliGRAM(s) SubCutaneous once  guaiFENesin   Syrup  (Sugar-Free) 200 milliGRAM(s) Oral every 6 hours  hydrALAZINE 25 milliGRAM(s) Oral two times a day  isosorbide   mononitrate ER Tablet (IMDUR) 30 milliGRAM(s) Oral daily  levalbuterol Inhalation 0.63 milliGRAM(s) Inhalation every 6 hours  levothyroxine 88 MICROGram(s) Oral daily  senna 2 Tablet(s) Oral at bedtime  sodium chloride 7% Inhalation 5 milliLiter(s) Inhalation every 6 hours  warfarin 6 milliGRAM(s) Oral once    MEDICATIONS  (PRN):  acetaminophen   Tablet .. 650 milliGRAM(s) Oral every 8 hours PRN Mild Pain (1 - 3)  HYDROmorphone  Injectable 0.2 milliGRAM(s) IV Push every 4 hours PRN dyspnea  traMADol 25 milliGRAM(s) Oral two times a day PRN Severe Pain (7 - 10)      Allergies    No Known Allergies    Intolerances        SOCIAL HISTORY: denies drug use    FAMILY HISTORY:      Vital Signs Last 24 Hrs  T(C): 36.4 (10 Rodrigo 2020 00:50), Max: 36.6 (09 Jan 2020 19:24)  T(F): 97.6 (10 Rodrigo 2020 00:50), Max: 97.8 (09 Jan 2020 19:24)  HR: 67 (10 Rodrigo 2020 10:45) (59 - 73)  BP: 155/89 (10 Rodrigo 2020 00:50) (110/50 - 155/89)  BP(mean): --  RR: 18 (10 Rodrigo 2020 00:50) (18 - 18)  SpO2: 99% (10 Rodrigo 2020 10:45) (91% - 100%)    Physical Exam:  The patient was alert and oriented X 3, well nourished, and in no apparent distress. Oropharynx showed no ulcerations. There was no visible lymphadenopathy. Conjunctiva were non-injected. There was no clubbing or edema of extremities.    The scalp, hair, face, eyebrows, lips, oropharynx , neck, chest, back, buttocks, extremities X 4, hands, feet, nails were examined. There was no hyperhidrosis or bromhidrosis.     The following lesions are noted:   friable erythematous nodule with active bleeding on central chest    LABS:    01-09    139  |  97  |  39<H>  ----------------------------<  85  4.0   |  26  |  1.11    Ca    9.0      09 Jan 2020 07:15      PT/INR - ( 10 Rodrigo 2020 08:56 )   PT: 20.2 sec;   INR: 1.73 ratio         PTT - ( 09 Jan 2020 08:25 )  PTT:32.3 sec      RADIOLOGY & ADDITIONAL STUDIES: no relevant studies

## 2020-01-10 NOTE — PROGRESS NOTE ADULT - SUBJECTIVE AND OBJECTIVE BOX
SUBJECTIVE AND OBJECTIVE: pt sitting in chair sleeping in NAD with family at bedside.  INTERVAL HPI/OVERNIGHT EVENTS: on bipap tolerating well    DNR on chart:   Allergies    No Known Allergies    Intolerances    MEDICATIONS  (STANDING):  albuterol/ipratropium for Nebulization 3 milliLiter(s) Nebulizer every 6 hours  bisacodyl 5 milliGRAM(s) Oral at bedtime  buMETAnide Injectable 1 milliGRAM(s) IV Push daily  chlorhexidine 2% Cloths 1 Application(s) Topical daily  diltiazem    Tablet 60 milliGRAM(s) Oral every 6 hours  hydrALAZINE 25 milliGRAM(s) Oral two times a day  isosorbide   mononitrate ER Tablet (IMDUR) 30 milliGRAM(s) Oral daily  levalbuterol Inhalation 0.63 milliGRAM(s) Inhalation every 6 hours  levothyroxine 88 MICROGram(s) Oral daily  senna 2 Tablet(s) Oral at bedtime  warfarin 6 milliGRAM(s) Oral once    MEDICATIONS  (PRN):  acetaminophen   Tablet .. 650 milliGRAM(s) Oral every 8 hours PRN Mild Pain (1 - 3)  traMADol 25 milliGRAM(s) Oral two times a day PRN Severe Pain (7 - 10)      ITEMS UNCHECKED ARE NOT PRESENT    PRESENT SYMPTOMS: [ ]Unable to obtain due to poor mentation   Source if other than patient:  [ ]Family   [ ]Team     Pain:  [x ]yes [ ]no  QOL impact -   Location -         ribs           Aggravating factors - movement deep breathing  Quality - sharp  Radiation - to back  Timing- uanble  Severity (0-10 scale): unable  Minimal acceptable level (0-10 scale):     Dyspnea:                           [ x]Mild [ ]Moderate [ ]Severe  Anxiety:                             [ ]Mild [ ]Moderate [ ]Severe  Fatigue:                             [x ]Mild [ ]Moderate [ ]Severe  Nausea:                            [ ]Mild [ ]Moderate [ ]Severe  Loss of appetite:              [ ]Mild [ ]Moderate [ ]Severe  Constipation:                    [ ]Mild [ ]Moderate [ ]Severe    PAIN AD Score:	  [email or web address].[email or web address]/painad.pdf (Ctrl + left click to view)    Other Symptoms:  [ ]All other review of systems negative     Palliative Performance Status Version 2:   40      %      http://npcrc.org/files/news/palliative_performance_scale_ppsv2.pdf    PHYSICAL EXAM:  Vital Signs Last 24 Hrs  T(C): 36.8 (10 Rodrigo 2020 13:59), Max: 36.8 (10 Rodrigo 2020 13:59)  T(F): 98.3 (10 Rodrigo 2020 13:59), Max: 98.3 (10 Rodrigo 2020 13:59)  HR: 82 (10 Rodrigo 2020 13:59) (59 - 82)  BP: 111/69 (10 Rodrigo 2020 13:59) (110/50 - 155/89)  BP(mean): --  RR: 20 (10 Rodrigo 2020 13:59) (18 - 20)  SpO2: 98% (10 Rodrigo 2020 13:59) (91% - 100%)  --------------------------------------------------------  IN: 275 mL / OUT: 400 mL / NET: -125 mL       GENERAL:  [x ]Alert  [x ]Oriented x 3  [ ]Lethargic  [ ]Cachexia  [ ]Unarousable  [x ]Verbal  [ ]Non-Verbal    Behavioral:   [ ]Anxiety  [ ]Delirium [ ]Agitation [ ]Other    HEENT:  [ ]Normal   [x ]Dry mouth   [ ]ET Tube/Trach  [ ]Oral lesions    PULMONARY:   [ ]Clear [ ]Tachypnea  [ ]Audible excessive secretions   [ ]Rhonchi        [ ]Right [ ]Left [ ]Bilateral  [ ]Crackles        [ ]Right [ ]Left [ ]Bilateral  [ ]Wheezing     [ ]Right [ ]Left [ ]Bilateral  [x ]Diminished BS [ ] Right [ ]Left [ x]Bilateral at bases     CARDIOVASCULAR:    [x ]Regular [x ]Irregular [ ]Tachy  [ ]Mak [ ]Murmur [ ]Other    GASTROINTESTINAL:  [x ]Soft  [x ]Distended   [x ]+BS  [x ]Non tender [ ]Tender  [ ]PEG [ ]OGT/ NGT   Last BM:      GENITOURINARY:  [ ]Normal [x ]Incontinent   [ ]Oliguria/Anuria   [ ]Shaw   [ ] External cath    MUSCULOSKELETAL:   [x ]Normal   [ ]Weakness  [ ]Bed/Wheelchair bound [ ]Edema    NEUROLOGIC:   [x ]No focal deficits  [ ] Cognitive impairment  [ ] Dysphagia [ ]Dysarthria [ ] Paresis [ ]Other     SKIN:   [ x]Normal  [ ]Rash   [ ]Pressure ulcer(s) [x ]y [ ]n present on admission  [x] other - sternal chest wound    CRITICAL CARE:  [ ]Shock Present  [ ]Septic [ ]Cardiogenic [ ]Neurologic [ ]Hypovolemic  [ ]Vasopressors [ ]Inotropes  [x ]Respiratory failure present [ ]Mechanical Ventilation [x ]Non-invasive ventilatory support [ ]High-Flow  [x ]Acute  [ ]Chronic [ ]Hypoxic  [ ]Hypercarbic [ ]Other  [ ]Other organ failure     LABS:                        10.3   9.70  )-----------( 296      ( 08 Jan 2020 08:53 )             31.9   01-09    139  |  97  |  39<H>  ----------------------------<  85  4.0   |  26  |  1.11    Ca    9.0      09 Jan 2020 07:15    PT/INR - ( 09 Jan 2020 08:25 )   PT: 18.0 sec;   INR: 1.57 ratio         PTT - ( 09 Jan 2020 08:25 )  PTT:32.3 sec      RADIOLOGY & ADDITIONAL STUDIES:    Protein Calorie Malnutrition Present: [ ]mild [ ]moderate [ ]severe [ ]underweight [ ]morbid obesity  https://www.andeal.org/vault/2440/web/files/ONC/Table_Clinical%20Characteristics%20to%20Document%20Malnutrition-White%20JV%20et%20al%523923.pdf    Height (cm): 157.5 (01-06-20 @ 16:24), 172.7 (12-19-19 @ 23:17)  Weight (kg): 75.7 (01-06-20 @ 16:24), 72.1 (12-19-19 @ 23:17)  BMI (kg/m2): 30.5 (01-06-20 @ 16:24), 24.2 (12-19-19 @ 23:17)    [ ]PPSV2 < or = 30%  [ ]significant weight loss [ ]poor nutritional intake [ ]anasarca   Albumin, Serum: 4.0 g/dL (01-05-20 @ 15:05)   [ ]Artificial Nutrition    REFERRALS:   [ ]Chaplaincy  [ ]Hospice  [ ]Child Life  [ ]Social Work  [ ]Case management [ ]Holistic Therapy     Goals of Care Document:

## 2020-01-10 NOTE — PROGRESS NOTE ADULT - ASSESSMENT
89 yo F      with   h/o      DVT.  A FIB on Coumadin,     SSX,  s/p   PPM .    HTN,  hypothyroid  old  menigioma on ct.        recne  admission on 12/ 19,  for,  from mlple rib fx's/  Left  7 to  10 th  ribs. 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 on Coumadin.  daily  inr  echo,  12/24/ 19,   mod  to severe   AS/  severe TR/  RV  dysfunction/ high LV filing pressure    on  cardizem/ hydralazine/bumex/  at  n home /    iv  bumex/     still with  tachypnea. , has impeoved    AFIB,      coumadin per  inr/ pending   s/p  acute resp failure on 1/ 7, / needing bipap,    ct  chest,   mucous plugging  airway  clearance/  acapella  RAAD, resolved/      spoke with  daughter and  son  pt  on bipap/  decompensates/ tachypneic,   when taken off  even  for short periods  seen by pulm, no role for steroids   family meeting with  palliative  care noted/  family will  decide  on disposition       per  daughter/ pts  dr little  in [facility] / dr bonner,  [facility]  PT eval    < from: CT Chest No Cont (01.08.20 @ 12:38) >  IMPRESSION:   Small bilateral pleural effusions.   Areas of mucous plugging in the right middle and bilateral lower lobes with associated atelectasis.  < end of copied text >     < from: Transthoracic Echocardiogram (12.24.19 @ 09:49) >   Mild mitral regurgitation.  2. Calcified trileaflet aortic valve with decreased  opening. Peak transaortic valve gradient equals 34 mm Hg,  mean transaortic valve gradient equals 22 mm Hg, estimated  aortic valve area equals 1.3 sqcm (by continuity equation),  aortic valve velocity time integral equals 56 cm,  consistent with moderate to severe aortic stenosis  (severity may be underestimated). Mild aortic  regurgitation.  3. Severely dilated left atrium.  LA volume index = 75  cc/m2.  4. Increased relative wall thickness with normal left  ventricular mass index, consistent with concentric left  ventricular remodeling.  5. Normal left ventricular systolic function. No segmental  wall motion abnormalities. Septal flattening consistent  with right ventricular overload. Septal motion consistent  with conduction abnormality or pacing.  6. Increased E/e'  is consistent with elevated left  ventricular filling pressure.  7. Severe right atrial enlargement.  8. Right ventricular enlargement with decreased right  ventricular systolic function. A device wire is noted in  the right heart.  9. Malcoaptation of the tricuspid valve leaflets. Severe  tricuspid regurgitation.    < end of copied text >     < from: CT Head No Cont (12.19.19 @ 21:11) >  IMPRESSION: No acute intracranial hemorrhage.  Unchanged appearing right occipital convexity calcified/ossified meningioma.  Similar-appearing microvascular disease.  < end of copied text >     < from: CT Angio Abdomen and Pelvis w/ IV Cont (12.19.19 @ 17:30) >  BONES: Acute left seventh through 10th rib fractures. Age-indeterminate T6 compression fracture. Spinal degenerative changes. Mild retrolisthesis of L1 on L2 and mild anterolisthesis of L4 and L5.  IMPRESSION: Evidence of mild peripheral interstitial pulmonary edema.  Acute left seventh through 10th rib fractures.  OLGA SADLER M.D., RADIOLOGY RESIDENT

## 2020-01-10 NOTE — GOALS OF CARE CONVERSATION - ADVANCED CARE PLANNING - CONVERSATION DETAILS
Palliative continues to follow this case for ongoing GOC and symptom management. NP Queta and LMNIKKI met with Patient and Patient's children Ramiro, Terrence, Soni, as well as Patient's son-in-law Karie at bedside today for family meeting.     Queta first inquired about what Patient knows her medical status to be, and then provided overview of current medical condition. Patient and family verbalized understanding of condition. Queta then inquired what is most important to Patient moving forward, and Patient states that removing the bipap and being comfortable are her main goals. Queta presented option of transfer to PCU for comfort and symptom management, and explained that the goal can be to remove bipap and transition to nasal cannula if possible. Queta informed Patient that there is the possibility that Patient cannot be weaned off bipap and that, if this is the case, goal can be medication management of respiratory needs, and focusing on comfort on PCU, where Patient would eventually . Patient and family verbalized understanding and agreement, and Patient added to waiting list for PCU bed once available.     Patient and family appear to be coping appropriately, and Patient participated in some life review during meeting. Much emotional support provided.     Palliative will continue to follow this case for symptom management and support, and plan for PCU transfer pending available bed. Palliative continues to follow this case for ongoing GOC and symptom management. NP Queta and LMNIKKI met with Patient and Patient's children Ramiro, Terrence, Soni, as well as Patient's son-in-law Karie at bedside today for family meeting.     Queta first inquired about what Patient knows her medical status to be, and then provided overview of current medical condition. Patient and family verbalized understanding of condition. Queta then inquired what is most important to Patient moving forward, and Patient states that removing the bipap and being comfortable are her main goals. Queta presented option of transfer to PCU for comfort and symptom management, and explained that the goal can be to remove bipap and transition to nasal cannula if possible, where Patient could potentially return home on home O2. Queta informed Patient that there is the possibility that Patient cannot be weaned off bipap, however, and that, if this is the case, goal can be medication management of respiratory needs, and focusing on comfort on PCU, where Patient would eventually . Patient and family verbalized understanding and agreement, and Patient added to waiting list for PCU bed once available.     Patient and family appear to be coping appropriately, and Patient participated in some life review during meeting. Much emotional support provided.     Palliative will continue to follow this case for symptom management and support, and plan for PCU transfer pending available bed.

## 2020-01-10 NOTE — CONSULT NOTE ADULT - ASSESSMENT
89F pmhx of CHF, pulm htn, on home o2, Afib, Pacemaker, HTN. [place] [facility] [place]  Likely due to acute on chronic CHF.  Palliative care called for GOC by dtr.
89y Female complaining of difficulty breathing.	  5F pmhx of CHF, Afib, Pacemaker, HTN presents from nursing facility for SOB and course breath sounds. As per EMS she was on 2L NC, but O2 tank ran out. Patient AOx3, states that the cough and SOB started 3 days ago. Denies fevers, chills, N/V/D, chest pain.  pt was recently admitted with chf and was treated medically.  pt with sig valvular heart disease on medical therapy.  chf acute on chronic sec to RV failure sec to ?sleep apnea and pulmonary htn  pt needs to have her Bipap at night  which might be the case of cf sec to non compliant.  iv bumex  continue all cardiac meds  pt needs to wear her bipap at night in Rehab and home.
1. Neoplasm of uncertain behavior of skin  on chest  Ddx includes nonmelanoma skin cancer vs pyogenic granuloma vs amelanotic melanoma  - Discussed risks and benefits of skin biopsy, advised family that biopsy would be needed for diagnosis  - Family would like to hold off at this time as they are considering comfort care and symptom management  - Instructed family to notify primary team if they are interested in skin biopsy while patient is inpatient  - Dermatology to sign off on this patient. Please re-consult for further questions or concerns.
89 yo F with a h/o HFpEF, Afib s/p PPM, severe AS and TR, e/o RV overload on TTE 12/24  HTN, EMILY diagnosed years ago (not on therapy), recent hospitalization for fractured ribs after coughing presents with from nursing facility with worsening SOB x 3days. Patient's two daughters and son-in law at bedside. State that prior to her admission in December was very active, without significant pulmonary history. Was coughing and fractured her left 7-10 ribs (has osteoporosis), admitted to the hospital  12/19/19, discharged to rehab. Now with progressive dyspnea, LE edema, proBNP of 5543 to 8930, TTe with severe AS and TR. Being diuresed, BP controlled. On 1/7, experienced increased work of breathing and placed on bilevel 12/6. Bumex increased to 1 mg IV BID.   Currently seen eating lunch, sitting up in bed, dyspneic. +cough, no sputum production, denies chest pain. 98% on 4 LNC    A/P:   1. Multifactorial dyspnea and hypoxemia- RV failure, severe AS, rib fractures, deconditioning  Would send RVP to r/o influenza/other viral infection as a contributor  c/w cardiac optimization- rate control, diuresis per cardiology, monitor ins/outs  Trial of bronchodilators - Xopenex nebs Q4 hours (may help with bronchospasm as heard on exam)  Bilevel 12/6 as needed and when sleeping     2. Rib fractures - pain control- tylenol, tramadol  Incentive spirometer, acapella to aid in airway clearance    3. DVT ppx    Thank you for the consult, will follow up.     Given multiple advanced comorbidities, advanced age, would consider Palliative consult for GOC discussion

## 2020-01-10 NOTE — PROGRESS NOTE ADULT - SUBJECTIVE AND OBJECTIVE BOX
BRAIN [id number]  89y  Female  Heart failure  Yes  Handoff  MEWS Score  Atrial fibrillation  Hypothyroid  HTN (hypertension)  DVT (deep venous thrombosis)  CHF exacerbation  Advanced care planning/counseling discussion  Encounter for palliative care  Functional quadriplegia  Dyspnea, unspecified type  Acute respiratory failure, unspecified whether with hypoxia or hypercapnia  Cardiac pacemaker  DIFF BREATHING  9    acetaminophen   Tablet .. 650 milliGRAM(s) Oral every 8 hours PRN  albuterol/ipratropium for Nebulization 3 milliLiter(s) Nebulizer every 6 hours  bisacodyl 5 milliGRAM(s) Oral at bedtime  buMETAnide Injectable 1 milliGRAM(s) IV Push daily  chlorhexidine 2% Cloths 1 Application(s) Topical daily  diltiazem    Tablet 60 milliGRAM(s) Oral every 6 hours  enoxaparin Injectable 40 milliGRAM(s) SubCutaneous once  guaiFENesin   Syrup  (Sugar-Free) 200 milliGRAM(s) Oral every 6 hours  hydrALAZINE 25 milliGRAM(s) Oral two times a day  HYDROmorphone  Injectable 0.2 milliGRAM(s) IV Push every 4 hours PRN  isosorbide   mononitrate ER Tablet (IMDUR) 30 milliGRAM(s) Oral daily  levalbuterol Inhalation 0.63 milliGRAM(s) Inhalation every 6 hours  levothyroxine 88 MICROGram(s) Oral daily  senna 2 Tablet(s) Oral at bedtime  sodium chloride 7% Inhalation 5 milliLiter(s) Inhalation every 6 hours  traMADol 25 milliGRAM(s) Oral two times a day PRN  warfarin 6 milliGRAM(s) Oral once  No Known Allergies    Patient visited at bedside with daughter. Thick, neglected dystrophic discolored nails both feet. DP Non-palpable PT 1/4 both feet 1+ edema both feet and ankles. Venous [id number] bilaterally  Thick, dystrophic discolored nails 1,2,3,4,5 both feet. Hammer toe 3 toe right foot with HD dorsal 3rd PIPJ with bursitis  Debride all nails 1,2,3,4,5 both feet with sterile nail clipper  Debride hyperkeratotic tissue dorsal 3rd toe right foot with sterile # 10 blade without incident  Patient to follow up with Dr. Anthony Crespo, podiatrist as out patient. reconsult podiatry as needed

## 2020-01-10 NOTE — PROGRESS NOTE ADULT - SUBJECTIVE AND OBJECTIVE BOX
[facility] - Division of Pulmonary, Critical Care and Sleep Medicine   Please call [phone] between 8am-pm weekdays, [phone] after hours and weekends    Interval Events: Patient seen with daughter at bedside - still tachypneic, using nebulizer, +cough, unable to expectorate    REVIEW OF SYSTEMS:  CV: [- ] chest pain   Resp: [+ ] cough [+ ] shortness of breath   [x ] All other systems negative  [ ] Unable to assess ROS because ________    OBJECTIVE:  ICU Vital Signs Last 24 Hrs  T(C): 36.8 (10 Rodrigo 2020 13:59), Max: 36.8 (10 Rodrigo 2020 13:59)  T(F): 98.3 (10 Rodrigo 2020 13:59), Max: 98.3 (10 Rodrigo 2020 13:59)  HR: 82 (10 Rodrigo 2020 13:59) (59 - 82)  BP: 111/69 (10 Rodrigo 2020 13:59) (110/50 - 155/89)  BP(mean): --  ABP: --  ABP(mean): --  RR: 20 (10 Rodrigo 2020 13:59) (18 - 20)  SpO2: 98% (10 Rodrigo 2020 13:59) (91% - 100%)        01-09 @ 07:01  -  01-10 @ 07:00  --------------------------------------------------------  IN: 865 mL / OUT: 900 mL / NET: -35 mL    01-10 @ 07:01  -  01-10 @ 15:50  --------------------------------------------------------  IN: 200 mL / OUT: 200 mL / NET: 0 mL      CAPILLARY BLOOD GLUCOSE          PHYSICAL EXAM:  General: NAD  HEENT: NC/AT  Neck: supple  Respiratory: b/l wheezes and rhonchi  Cardiovascular:  RRR, no m/r/g  Abdomen: soft, NT/ND, +BS  Extremities: no clubbing, cyanosis or edema, warm  Skin: no rash  Neurological: AAOx3, non focal exam  Psychiatry: not anxious appearing, normal affect and mood    HOSPITAL MEDICATIONS:  MEDICATIONS  (STANDING):  albuterol/ipratropium for Nebulization 3 milliLiter(s) Nebulizer every 6 hours  bisacodyl 5 milliGRAM(s) Oral at bedtime  buDESOnide    Inhalation Suspension 0.5 milliGRAM(s) Inhalation two times a day  buMETAnide Injectable 1 milliGRAM(s) IV Push daily  chlorhexidine 2% Cloths 1 Application(s) Topical daily  diltiazem    Tablet 60 milliGRAM(s) Oral every 6 hours  enoxaparin Injectable 40 milliGRAM(s) SubCutaneous once  guaiFENesin   Syrup  (Sugar-Free) 200 milliGRAM(s) Oral every 6 hours  hydrALAZINE 25 milliGRAM(s) Oral two times a day  isosorbide   mononitrate ER Tablet (IMDUR) 30 milliGRAM(s) Oral daily  levalbuterol Inhalation 0.63 milliGRAM(s) Inhalation every 6 hours  levothyroxine 88 MICROGram(s) Oral daily  senna 2 Tablet(s) Oral at bedtime  sodium chloride 7% Inhalation 5 milliLiter(s) Inhalation every 6 hours  warfarin 6 milliGRAM(s) Oral once    MEDICATIONS  (PRN):  acetaminophen   Tablet .. 650 milliGRAM(s) Oral every 8 hours PRN Mild Pain (1 - 3)  HYDROmorphone  Injectable 0.2 milliGRAM(s) IV Push every 4 hours PRN dyspnea  traMADol 25 milliGRAM(s) Oral two times a day PRN Severe Pain (7 - 10)      LABS:    Hgb Trend: 10.3<--, 10.4<--, 11.0<--  01-09    139  |  97  |  39<H>  ----------------------------<  85  4.0   |  26  |  1.11    Ca    9.0      09 Jan 2020 07:15      Creatinine Trend: 1.11<--, 1.61<--, 1.26<--, 1.30<--, 1.27<--, 1.40<--  PT/INR - ( 10 Rodrigo 2020 08:56 )   PT: 20.2 sec;   INR: 1.73 ratio         PTT - ( 09 Jan 2020 08:25 )  PTT:32.3 sec          MICROBIOLOGY:     RADIOLOGY:  [x ] Reviewed and interpreted by me  < from: CT Chest No Cont (01.08.20 @ 12:38) >  EXAM:  CT CHEST                            PROCEDURE DATE:  01/08/2020            INTERPRETATION:  CLINICAL INFORMATION: Shortness of breath    COMPARISON: CT chest 12/19/2019    PROCEDURE:   CT of the Chest was performed without intravenous contrast.  Sagittal and coronal reformats were performed.    FINDINGS:    No axillary or mediastinal adenopathy.    Calcifications of the thoracic aorta. The heart is enlarged. Mitral annular, aortic valvular, and coronary artery calcifications. No pericardial effusion. Left-sided dual-lead cardiac pacer in place.    No endobronchial lesion. Small bilateral pleural effusions. Partial opacification of the right middle and bilateral lower lobe bronchi, likely mucous plugging. Bibasilar areas of atelectasis. There is also an area of left upper lobe atelectasis and/or scarring. No pneumothorax.    Below the diaphragm, there is a subcentimeter left hepatic hypodense focus and subcentimeter renal lesions, too small characterize.    Evaluation of the osseous structures demonstrate degenerative changes, body height loss of a few thoracic vertebral bodies, and chronic left-sided rib fractures.    IMPRESSION:     Small bilateral pleural effusions.     Areas of mucous plugging in the right middle and bilateral lower lobes with associated atelectasis.      AJITH SHAW M.D., RADIOLOGY RESIDENT  This document has been electronically signed.  PIERO NANCE M.D., ATTENDING RADIOLOGIST  This document has been electronically signed. Jan 8 2020  4:28PM        < end of copied text >

## 2020-01-10 NOTE — PROGRESS NOTE ADULT - PROBLEM SELECTOR PLAN 4
Met with pt, pts children Soni Samuel, and Ramiro and Ronal JORDAN. Discussed with pt and Ramiro (pts dtr) her current medical conditions.  Pt verbalized understanding of her "enlarged heart and trouble breathing because of it"  Pt understands that she cannot live on a bipap machine and would like to try to wean it off if possible.  Plan to go to PCU when bed is available to wean off bipap.  We spoke about if pt is able to wean off and go on NC that she will likely need O2 supplementation at home if she is able to get there.  If we are unable to wean pt off bipap pt verbalizes understanding that she could die without it.  Pt confirms that she does not want to be intubated and would want to die peacefully and comfortably. Palliative care will continue to follow for symptoms.

## 2020-01-11 LAB
INR BLD: 2.29 RATIO — HIGH (ref 0.88–1.16)
PROTHROM AB SERPL-ACNC: 26.8 SEC — HIGH (ref 10–13.1)

## 2020-01-11 PROCEDURE — 99232 SBSQ HOSP IP/OBS MODERATE 35: CPT

## 2020-01-11 PROCEDURE — 99233 SBSQ HOSP IP/OBS HIGH 50: CPT

## 2020-01-11 RX ORDER — WARFARIN SODIUM 2.5 MG/1
4 TABLET ORAL ONCE
Refills: 0 | Status: COMPLETED | OUTPATIENT
Start: 2020-01-11 | End: 2020-01-11

## 2020-01-11 RX ORDER — HYDROMORPHONE HYDROCHLORIDE 2 MG/ML
0.2 INJECTION INTRAMUSCULAR; INTRAVENOUS; SUBCUTANEOUS
Refills: 0 | Status: DISCONTINUED | OUTPATIENT
Start: 2020-01-11 | End: 2020-01-15

## 2020-01-11 RX ADMIN — SODIUM CHLORIDE 5 MILLILITER(S): 9 INJECTION INTRAMUSCULAR; INTRAVENOUS; SUBCUTANEOUS at 00:52

## 2020-01-11 RX ADMIN — Medication 0.5 MILLIGRAM(S): at 16:25

## 2020-01-11 RX ADMIN — Medication 25 MILLIGRAM(S): at 10:25

## 2020-01-11 RX ADMIN — HYDROMORPHONE HYDROCHLORIDE 0.2 MILLIGRAM(S): 2 INJECTION INTRAMUSCULAR; INTRAVENOUS; SUBCUTANEOUS at 15:50

## 2020-01-11 RX ADMIN — Medication 0.5 MILLIGRAM(S): at 06:24

## 2020-01-11 RX ADMIN — LEVALBUTEROL 0.63 MILLIGRAM(S): 1.25 SOLUTION, CONCENTRATE RESPIRATORY (INHALATION) at 06:26

## 2020-01-11 RX ADMIN — BUMETANIDE 1 MILLIGRAM(S): 0.25 INJECTION INTRAMUSCULAR; INTRAVENOUS at 06:24

## 2020-01-11 RX ADMIN — Medication 60 MILLIGRAM(S): at 06:25

## 2020-01-11 RX ADMIN — SODIUM CHLORIDE 5 MILLILITER(S): 9 INJECTION INTRAMUSCULAR; INTRAVENOUS; SUBCUTANEOUS at 06:25

## 2020-01-11 RX ADMIN — Medication 88 MICROGRAM(S): at 06:24

## 2020-01-11 RX ADMIN — HYDROMORPHONE HYDROCHLORIDE 0.2 MILLIGRAM(S): 2 INJECTION INTRAMUSCULAR; INTRAVENOUS; SUBCUTANEOUS at 20:30

## 2020-01-11 RX ADMIN — Medication 3 MILLILITER(S): at 06:24

## 2020-01-11 RX ADMIN — Medication 3 MILLILITER(S): at 00:51

## 2020-01-11 RX ADMIN — LEVALBUTEROL 0.63 MILLIGRAM(S): 1.25 SOLUTION, CONCENTRATE RESPIRATORY (INHALATION) at 00:51

## 2020-01-11 RX ADMIN — HYDROMORPHONE HYDROCHLORIDE 0.2 MILLIGRAM(S): 2 INJECTION INTRAMUSCULAR; INTRAVENOUS; SUBCUTANEOUS at 19:59

## 2020-01-11 NOTE — PROGRESS NOTE ADULT - SUBJECTIVE AND OBJECTIVE BOX
CARDIOLOGY     PROGRESS  NOTE   ________________________________________________    CHIEF COMPLAINT:Patient is a 89y old  Female who presents with a chief complaint of sob (11 Jan 2020 08:53)  doing better.  	  REVIEW OF SYSTEMS:  CONSTITUTIONAL: No fever, weight loss, or fatigue  EYES: No eye pain, visual disturbances, or discharge  ENT:  No difficulty hearing, tinnitus, vertigo; No sinus or throat pain  NECK: No pain or stiffness  RESPIRATORY: No cough, wheezing, chills or hemoptysis; decreaase  Shortness of Breath  CARDIOVASCULAR: No chest pain, palpitations, passing out, dizziness, or leg swelling  GASTROINTESTINAL: No abdominal or epigastric pain. No nausea, vomiting, or hematemesis; No diarrhea or constipation. No melena or hematochezia.  GENITOURINARY: No dysuria, frequency, hematuria, or incontinence  NEUROLOGICAL: No headaches, memory loss, loss of strength, numbness, or tremors  SKIN: No itching, burning, rashes, or lesions   LYMPH Nodes: No enlarged glands  ENDOCRINE: No heat or cold intolerance; No hair loss  MUSCULOSKELETAL: No joint pain or swelling; No muscle, back, or extremity pain  PSYCHIATRIC: No depression, anxiety, mood swings, or difficulty sleeping  HEME/LYMPH: No easy bruising, or bleeding gums  ALLERGY AND IMMUNOLOGIC: No hives or eczema	    [ ] All others negative	  [ ] Unable to obtain    PHYSICAL EXAM:  T(C): 36.4 (01-11-20 @ 04:50), Max: 36.8 (01-10-20 @ 13:59)  HR: 86 (01-11-20 @ 04:51) (67 - 96)  BP: 120/77 (01-11-20 @ 04:50) (111/69 - 141/70)  RR: 19 (01-11-20 @ 04:50) (19 - 20)  SpO2: 97% (01-11-20 @ 04:51) (96% - 100%)  Wt(kg): --  I&O's Summary    10 Rodrigo 2020 07:01  -  11 Jan 2020 07:00  --------------------------------------------------------  IN: 420 mL / OUT: 1050 mL / NET: -630 mL        Appearance: Normal	  HEENT:   Normal oral mucosa, PERRL, EOMI	  Lymphatic: No lymphadenopathy  Cardiovascular: Normal S1 S2, No JVD, + murmurs, No edema  Respiratory: decrease bs  Psychiatry: A & O x 3, Mood & affect appropriate  Gastrointestinal:  Soft, Non-tender, + BS	  Skin: No rashes, No ecchymoses, No cyanosis	  Neurologic: Non-focal  Extremities: Normal range of motion, No clubbing, cyanosis or edema  Vascular: Peripheral pulses palpable 2+ bilaterally    MEDICATIONS  (STANDING):  albuterol/ipratropium for Nebulization 3 milliLiter(s) Nebulizer every 6 hours  bisacodyl 5 milliGRAM(s) Oral at bedtime  buDESOnide    Inhalation Suspension 0.5 milliGRAM(s) Inhalation two times a day  buMETAnide Injectable 1 milliGRAM(s) IV Push daily  chlorhexidine 2% Cloths 1 Application(s) Topical daily  diltiazem    Tablet 60 milliGRAM(s) Oral every 6 hours  guaiFENesin   Syrup  (Sugar-Free) 200 milliGRAM(s) Oral every 6 hours  hydrALAZINE 25 milliGRAM(s) Oral two times a day  isosorbide   mononitrate ER Tablet (IMDUR) 30 milliGRAM(s) Oral daily  levalbuterol Inhalation 0.63 milliGRAM(s) Inhalation every 6 hours  levothyroxine 88 MICROGram(s) Oral daily  senna 2 Tablet(s) Oral at bedtime  sodium chloride 7% Inhalation 5 milliLiter(s) Inhalation every 6 hours      TELEMETRY: 	    ECG:  	  RADIOLOGY:  OTHER: 	  	  LABS:	 	    CARDIAC MARKERS:    Comprehensive Metabolic Panel (01.05.20 @ 15:05)    Sodium, Serum: 129 mmol/L    Potassium, Serum: 4.6 mmol/L    Chloride, Serum: 93 mmol/L    Carbon Dioxide, Serum: 20 mmol/L    Anion Gap, Serum: 16 mmol/L    Blood Urea Nitrogen, Serum: 39 mg/dL    Creatinine, Serum: 1.27 mg/dL    Glucose, Serum: 235 mg/dL    Calcium, Total Serum: 9.6 mg/dL    Protein Total, Serum: 8.0 g/dL    Albumin, Serum: 4.0 g/dL    Bilirubin Total, Serum: 0.6 mg/dL    Alkaline Phosphatase, Serum: 173 U/L    Aspartate Aminotransferase (AST/SGOT): 25 U/L    Alanine Aminotransferase (ALT/SGPT): 20 U/L        proBNP: Serum Pro-Brain Natriuretic Peptide: 8930 pg/mL (01-05 @ 15:05)  Serum Pro-Brain Natriuretic Peptide: 5543 pg/mL (12-19 @ 13:19)    Lipid Profile:   HgA1c:   TSH: Thyroid Stimulating Hormone, Serum: 6.79 uIU/mL (12-20 @ 09:12)    PT/INR - ( 10 Rodrigo 2020 08:56 )   PT: 20.2 sec;   INR: 1.73 ratio       < from: CT Chest No Cont (01.08.20 @ 12:38) >  Small bilateral pleural effusions.     Areas of mucous plugging in the right middle and bilateral lower lobes with associated atelectasis.    < end of copied text >          Assessment and plan  ---------------------------  89y Female complaining of difficulty breathing.	  5F pmhx of CHF, Afib, Pacemaker, HTN presents from nursing facility for SOB and course breath sounds. As per EMS she was on 2L NC, but O2 tank ran out. Patient AOx3, states that the cough and SOB started 3 days ago. Denies fevers, chills, N/V/D, chest pain.  pt was recently admitted with chf and was treated medically.  pt with sig valvular heart disease on medical therapy.  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  iv bumex  continue all cardiac meds  pt needs to wear her bipap at night in Rehab and home.  continue diuresis  add imdur 30 mg daily as tolerated  still with sob check ct chest no contrast r/o aspiration, ct noted  doing better  decrease bumex  pulmonary eval noted  ? adding  steroid and suction/ duoneb  ac keep ir 2-3 CARDIOLOGY     PROGRESS  NOTE   ________________________________________________    CHIEF COMPLAINT:Patient is a 89y old  Female who presents with a chief complaint of sob (11 Jan 2020 08:53)  doing better.  	  REVIEW OF SYSTEMS:  CONSTITUTIONAL: No fever, weight loss, or fatigue  EYES: No eye pain, visual disturbances, or discharge  ENT:  No difficulty hearing, tinnitus, vertigo; No sinus or throat pain  NECK: No pain or stiffness  RESPIRATORY: No cough, wheezing, chills or hemoptysis; decreaase  Shortness of Breath  CARDIOVASCULAR: No chest pain, palpitations, passing out, dizziness, or leg swelling  GASTROINTESTINAL: No abdominal or epigastric pain. No nausea, vomiting, or hematemesis; No diarrhea or constipation. No melena or hematochezia.  GENITOURINARY: No dysuria, frequency, hematuria, or incontinence  NEUROLOGICAL: No headaches, memory loss, loss of strength, numbness, or tremors  SKIN: No itching, burning, rashes, or lesions   LYMPH Nodes: No enlarged glands  ENDOCRINE: No heat or cold intolerance; No hair loss  MUSCULOSKELETAL: No joint pain or swelling; No muscle, back, or extremity pain  PSYCHIATRIC: No depression, anxiety, mood swings, or difficulty sleeping  HEME/LYMPH: No easy bruising, or bleeding gums  ALLERGY AND IMMUNOLOGIC: No hives or eczema	    [ ] All others negative	  [ ] Unable to obtain    PHYSICAL EXAM:  T(C): 36.4 (01-11-20 @ 04:50), Max: 36.8 (01-10-20 @ 13:59)  HR: 86 (01-11-20 @ 04:51) (67 - 96)  BP: 120/77 (01-11-20 @ 04:50) (111/69 - 141/70)  RR: 19 (01-11-20 @ 04:50) (19 - 20)  SpO2: 97% (01-11-20 @ 04:51) (96% - 100%)  Wt(kg): --  I&O's Summary    10 Rodrigo 2020 07:01  -  11 Jan 2020 07:00  --------------------------------------------------------  IN: 420 mL / OUT: 1050 mL / NET: -630 mL        Appearance: Normal	  HEENT:   Normal oral mucosa, PERRL, EOMI	  Lymphatic: No lymphadenopathy  Cardiovascular: Normal S1 S2, No JVD, + murmurs, No edema  Respiratory: decrease bs  Psychiatry: A & O x 3, Mood & affect appropriate  Gastrointestinal:  Soft, Non-tender, + BS	  Skin: No rashes, No ecchymoses, No cyanosis	  Neurologic: Non-focal  Extremities: Normal range of motion, No clubbing, cyanosis or edema  Vascular: Peripheral pulses palpable 2+ bilaterally    MEDICATIONS  (STANDING):  albuterol/ipratropium for Nebulization 3 milliLiter(s) Nebulizer every 6 hours  bisacodyl 5 milliGRAM(s) Oral at bedtime  buDESOnide    Inhalation Suspension 0.5 milliGRAM(s) Inhalation two times a day  buMETAnide Injectable 1 milliGRAM(s) IV Push daily  chlorhexidine 2% Cloths 1 Application(s) Topical daily  diltiazem    Tablet 60 milliGRAM(s) Oral every 6 hours  guaiFENesin   Syrup  (Sugar-Free) 200 milliGRAM(s) Oral every 6 hours  hydrALAZINE 25 milliGRAM(s) Oral two times a day  isosorbide   mononitrate ER Tablet (IMDUR) 30 milliGRAM(s) Oral daily  levalbuterol Inhalation 0.63 milliGRAM(s) Inhalation every 6 hours  levothyroxine 88 MICROGram(s) Oral daily  senna 2 Tablet(s) Oral at bedtime  sodium chloride 7% Inhalation 5 milliLiter(s) Inhalation every 6 hours      TELEMETRY: 	    ECG:  	  RADIOLOGY:  OTHER: 	  	  LABS:	 	    CARDIAC MARKERS:    Comprehensive Metabolic Panel (01.05.20 @ 15:05)    Sodium, Serum: 129 mmol/L    Potassium, Serum: 4.6 mmol/L    Chloride, Serum: 93 mmol/L    Carbon Dioxide, Serum: 20 mmol/L    Anion Gap, Serum: 16 mmol/L    Blood Urea Nitrogen, Serum: 39 mg/dL    Creatinine, Serum: 1.27 mg/dL    Glucose, Serum: 235 mg/dL    Calcium, Total Serum: 9.6 mg/dL    Protein Total, Serum: 8.0 g/dL    Albumin, Serum: 4.0 g/dL    Bilirubin Total, Serum: 0.6 mg/dL    Alkaline Phosphatase, Serum: 173 U/L    Aspartate Aminotransferase (AST/SGOT): 25 U/L    Alanine Aminotransferase (ALT/SGPT): 20 U/L        proBNP: Serum Pro-Brain Natriuretic Peptide: 8930 pg/mL (01-05 @ 15:05)  Serum Pro-Brain Natriuretic Peptide: 5543 pg/mL (12-19 @ 13:19)    Lipid Profile:   HgA1c:   TSH: Thyroid Stimulating Hormone, Serum: 6.79 uIU/mL (12-20 @ 09:12)    PT/INR - ( 10 Rodrigo 2020 08:56 )   PT: 20.2 sec;   INR: 1.73 ratio       < from: CT Chest No Cont (01.08.20 @ 12:38) >  Small bilateral pleural effusions.     Areas of mucous plugging in the right middle and bilateral lower lobes with associated atelectasis.    < end of copied text >          Assessment and plan  ---------------------------  89y Female complaining of difficulty breathing.	  5F pmhx of CHF, Afib, Pacemaker, HTN presents from nursing facility for SOB and course breath sounds. As per EMS she was on 2L NC, but O2 tank ran out. Patient AOx3, states that the cough and SOB started 3 days ago. Denies fevers, chills, N/V/D, chest pain.  pt was recently admitted with chf and was treated medically.  pt with sig valvular heart disease on medical therapy.  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  will sign off awaiting palliative care room  palliative attending catlled

## 2020-01-11 NOTE — PROGRESS NOTE ADULT - ASSESSMENT
89 yo F      with   h/o      DVT.  A FIB on Coumadin,     SSX,  s/p   PPM .    HTN,  hypothyroid  old  menigioma on ct.        recne  admission on 12/ 19,  for,  from mlple rib fx's/  Left  7 to  10 th  ribs. 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 on Coumadin.  daily  inr  echo,  12/24/ 19,   mod  to severe   AS/  severe TR/  RV  dysfunction/ high LV filing pressure    on  cardizem/ hydralazine/bumex/  at  n home /    iv  bumex/     still with  tachypnea. , has impeoved    AFIB,      coumadin per  inr/ pending   s/p  acute resp failure on 1/ 7, / needing bipap,    ct  chest,   mucous plugging  airway  clearance/  acapella  RAAD, resolved/       pt  on bipap/  decompensates/ tachypneic,   when taken off  even  for short periods  seen by pulm, no role for steroids   family meeting with  palliative  care noted/  awaiting transfer  to  PCU  and  weaning off bipap       per  daughter/ pts  dr little  in [facility] / dr bonner,  [facility]      < from: CT Chest No Cont (01.08.20 @ 12:38) >  IMPRESSION:   Small bilateral pleural effusions.   Areas of mucous plugging in the right middle and bilateral lower lobes with associated atelectasis.  < end of copied text >     < from: Transthoracic Echocardiogram (12.24.19 @ 09:49) >   Mild mitral regurgitation.  2. Calcified trileaflet aortic valve with decreased  opening. Peak transaortic valve gradient equals 34 mm Hg,  mean transaortic valve gradient equals 22 mm Hg, estimated  aortic valve area equals 1.3 sqcm (by continuity equation),  aortic valve velocity time integral equals 56 cm,  consistent with moderate to severe aortic stenosis  (severity may be underestimated). Mild aortic  regurgitation.  3. Severely dilated left atrium.  LA volume index = 75  cc/m2.  4. Increased relative wall thickness with normal left  ventricular mass index, consistent with concentric left  ventricular remodeling.  5. Normal left ventricular systolic function. No segmental  wall motion abnormalities. Septal flattening consistent  with right ventricular overload. Septal motion consistent  with conduction abnormality or pacing.  6. Increased E/e'  is consistent with elevated left  ventricular filling pressure.  7. Severe right atrial enlargement.  8. Right ventricular enlargement with decreased right  ventricular systolic function. A device wire is noted in  the right heart.  9. Malcoaptation of the tricuspid valve leaflets. Severe  tricuspid regurgitation.    < end of copied text >     < from: CT Head No Cont (12.19.19 @ 21:11) >  IMPRESSION: No acute intracranial hemorrhage.  Unchanged appearing right occipital convexity calcified/ossified meningioma.  Similar-appearing microvascular disease.  < end of copied text >     < from: CT Angio Abdomen and Pelvis w/ IV Cont (12.19.19 @ 17:30) >  BONES: Acute left seventh through 10th rib fractures. Age-indeterminate T6 compression fracture. Spinal degenerative changes. Mild retrolisthesis of L1 on L2 and mild anterolisthesis of L4 and L5.  IMPRESSION: Evidence of mild peripheral interstitial pulmonary edema.  Acute left seventh through 10th rib fractures.  OLGA SADLER M.D., RADIOLOGY RESIDENT 89 yo F      with   h/o      DVT.  A FIB on Coumadin,     SSX,  s/p   PPM .    HTN,  hypothyroid  old  menigioma on ct.        recne  admission on 12/ 19,  for,  from mlple rib fx's/  Left  7 to  10 th  ribs. 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 on Coumadin.  daily  inr  echo,  12/24/ 19,   mod  to severe   AS/  severe TR/  RV  dysfunction/ high LV filing pressure    on  cardizem/ hydralazine/bumex/  at  n home /    iv  bumex/     still with  tachypnea. , has impeoved    AFIB,      coumadin per  inr/ pending   s/p  acute resp failure on 1/ 7, / needing bipap,    ct  chest,   mucous plugging    airway  clearance/  acapella  RAAD, resolved/       pt  on bipap/  decompensates/ tachypneic,   when taken off  even  for short periods  seen by pulm, no role for steroids   family meeting with  palliative  care noted/  awaiting transfer  to  PCU  and  weaning off bipap  wheezing today/ on iv  solemedrol       per  daughter/ pts  dr little  in [facility] / dr bonner,  [facility]      < from: CT Chest No Cont (01.08.20 @ 12:38) >  IMPRESSION:   Small bilateral pleural effusions.   Areas of mucous plugging in the right middle and bilateral lower lobes with associated atelectasis.  < end of copied text >     < from: Transthoracic Echocardiogram (12.24.19 @ 09:49) >   Mild mitral regurgitation.  2. Calcified trileaflet aortic valve with decreased  opening. Peak transaortic valve gradient equals 34 mm Hg,  mean transaortic valve gradient equals 22 mm Hg, estimated  aortic valve area equals 1.3 sqcm (by continuity equation),  aortic valve velocity time integral equals 56 cm,  consistent with moderate to severe aortic stenosis  (severity may be underestimated). Mild aortic  regurgitation.  3. Severely dilated left atrium.  LA volume index = 75  cc/m2.  4. Increased relative wall thickness with normal left  ventricular mass index, consistent with concentric left  ventricular remodeling.  5. Normal left ventricular systolic function. No segmental  wall motion abnormalities. Septal flattening consistent  with right ventricular overload. Septal motion consistent  with conduction abnormality or pacing.  6. Increased E/e'  is consistent with elevated left  ventricular filling pressure.  7. Severe right atrial enlargement.  8. Right ventricular enlargement with decreased right  ventricular systolic function. A device wire is noted in  the right heart.  9. Malcoaptation of the tricuspid valve leaflets. Severe  tricuspid regurgitation.    < end of copied text >     < from: CT Head No Cont (12.19.19 @ 21:11) >  IMPRESSION: No acute intracranial hemorrhage.  Unchanged appearing right occipital convexity calcified/ossified meningioma.  Similar-appearing microvascular disease.  < end of copied text >     < from: CT Angio Abdomen and Pelvis w/ IV Cont (12.19.19 @ 17:30) >  BONES: Acute left seventh through 10th rib fractures. Age-indeterminate T6 compression fracture. Spinal degenerative changes. Mild retrolisthesis of L1 on L2 and mild anterolisthesis of L4 and L5.  IMPRESSION: Evidence of mild peripheral interstitial pulmonary edema.  Acute left seventh through 10th rib fractures.  OLGA SADLER M.D., RADIOLOGY RESIDENT

## 2020-01-11 NOTE — PROVIDER CONTACT NOTE (OTHER) - ACTION/TREATMENT ORDERED:
MD Malone at pts bedside, contacted Palliative contacted and came to pts bedside to speak with pt and family

## 2020-01-11 NOTE — PROGRESS NOTE ADULT - PROBLEM SELECTOR PLAN 4
Met with family, who are hoping for PCU bed, but no availability currently. They would like medication for dyspnea-anxiety-dyspnea cycle; started ativan 0.5mg IV Q2 PRN and dilaudid 0.2mg IV Q2 PRN. Emotional support provided.

## 2020-01-11 NOTE — PROGRESS NOTE ADULT - SUBJECTIVE AND OBJECTIVE BOX
resting. on bipap  REVIEW OF SYSTEMS:  GEN: no fever,    no chills  RESP: no SOB,   no cough  CVS: no chest pain,   no palpitations  GI: no abdominal pain,   no nausea,   no vomiting,   no constipation,   no diarrhea  : no dysuria,   no frequency  NEURO: no headache,   no dizziness  PSYCH: no depression,   not anxious  Derm : no rash    MEDICATIONS  (STANDING):  albuterol/ipratropium for Nebulization 3 milliLiter(s) Nebulizer every 6 hours  bisacodyl 5 milliGRAM(s) Oral at bedtime  buDESOnide    Inhalation Suspension 0.5 milliGRAM(s) Inhalation two times a day  buMETAnide Injectable 1 milliGRAM(s) IV Push daily  chlorhexidine 2% Cloths 1 Application(s) Topical daily  diltiazem    Tablet 60 milliGRAM(s) Oral every 6 hours  guaiFENesin   Syrup  (Sugar-Free) 200 milliGRAM(s) Oral every 6 hours  hydrALAZINE 25 milliGRAM(s) Oral two times a day  isosorbide   mononitrate ER Tablet (IMDUR) 30 milliGRAM(s) Oral daily  levalbuterol Inhalation 0.63 milliGRAM(s) Inhalation every 6 hours  levothyroxine 88 MICROGram(s) Oral daily  senna 2 Tablet(s) Oral at bedtime  sodium chloride 7% Inhalation 5 milliLiter(s) Inhalation every 6 hours    MEDICATIONS  (PRN):  acetaminophen   Tablet .. 650 milliGRAM(s) Oral every 8 hours PRN Mild Pain (1 - 3)  HYDROmorphone  Injectable 0.2 milliGRAM(s) IV Push every 4 hours PRN dyspnea  traMADol 25 milliGRAM(s) Oral two times a day PRN Severe Pain (7 - 10)      Vital Signs Last 24 Hrs  T(C): 36.4 (11 Jan 2020 04:50), Max: 36.8 (10 Rodrigo 2020 13:59)  T(F): 97.6 (11 Jan 2020 04:50), Max: 98.3 (10 Rodrigo 2020 13:59)  HR: 86 (11 Jan 2020 04:51) (67 - 96)  BP: 120/77 (11 Jan 2020 04:50) (111/69 - 141/70)  BP(mean): --  RR: 19 (11 Jan 2020 04:50) (19 - 20)  SpO2: 97% (11 Jan 2020 04:51) (96% - 100%)  CAPILLARY BLOOD GLUCOSE        I&O's Summary    10 Rodrigo 2020 07:01  -  11 Jan 2020 07:00  --------------------------------------------------------  IN: 420 mL / OUT: 1050 mL / NET: -630 mL        PHYSICAL EXAM:  HEAD:  Atraumatic, Normocephalic  NECK: Supple, No   JVD  CHEST/LUNG:   no     rales,     no,    rhonchi  HEART: Regular rate and rhythm;         murmur  ABDOMEN: Soft, Nontender, ;   EXTREMITIES:    mild    edema  NEUROLOGY:  alert    LABS:          PT/INR - ( 10 Rodrigo 2020 08:56 )   PT: 20.2 sec;   INR: 1.73 ratio                         Thyroid Stimulating Hormone, Serum: 6.79 uIU/mL (12-20 @ 09:12)          Consultant(s) Notes Reviewed:      Care Discussed with Consultants/Other Providers: eating  now/ on nasal  canula  for  now/  mild  tacypnea  REVIEW OF SYSTEMS:  GEN: no fever,    no chills  RESP:OB,   no cough  CVS: no chest pain,   no palpitations  GI: no abdominal pain,   no nausea,   no vomiting,   no constipation,   no diarrhea  : no dysuria,   no frequency  NEURO: no headache,   no dizziness  PSYCH: no depression,   not anxious  Derm : no rash    MEDICATIONS  (STANDING):  albuterol/ipratropium for Nebulization 3 milliLiter(s) Nebulizer every 6 hours  bisacodyl 5 milliGRAM(s) Oral at bedtime  buDESOnide    Inhalation Suspension 0.5 milliGRAM(s) Inhalation two times a day  buMETAnide Injectable 1 milliGRAM(s) IV Push daily  chlorhexidine 2% Cloths 1 Application(s) Topical daily  diltiazem    Tablet 60 milliGRAM(s) Oral every 6 hours  guaiFENesin   Syrup  (Sugar-Free) 200 milliGRAM(s) Oral every 6 hours  hydrALAZINE 25 milliGRAM(s) Oral two times a day  isosorbide   mononitrate ER Tablet (IMDUR) 30 milliGRAM(s) Oral daily  levalbuterol Inhalation 0.63 milliGRAM(s) Inhalation every 6 hours  levothyroxine 88 MICROGram(s) Oral daily  senna 2 Tablet(s) Oral at bedtime  sodium chloride 7% Inhalation 5 milliLiter(s) Inhalation every 6 hours    MEDICATIONS  (PRN):  acetaminophen   Tablet .. 650 milliGRAM(s) Oral every 8 hours PRN Mild Pain (1 - 3)  HYDROmorphone  Injectable 0.2 milliGRAM(s) IV Push every 4 hours PRN dyspnea  traMADol 25 milliGRAM(s) Oral two times a day PRN Severe Pain (7 - 10)      Vital Signs Last 24 Hrs  T(C): 36.4 (11 Jan 2020 04:50), Max: 36.8 (10 Rodrigo 2020 13:59)  T(F): 97.6 (11 Jan 2020 04:50), Max: 98.3 (10 Rodrigo 2020 13:59)  HR: 86 (11 Jan 2020 04:51) (67 - 96)  BP: 120/77 (11 Jan 2020 04:50) (111/69 - 141/70)  BP(mean): --  RR: 19 (11 Jan 2020 04:50) (19 - 20)  SpO2: 97% (11 Jan 2020 04:51) (96% - 100%)  CAPILLARY BLOOD GLUCOSE        I&O's Summary    10 Rodrigo 2020 07:01  -  11 Jan 2020 07:00  --------------------------------------------------------  IN: 420 mL / OUT: 1050 mL / NET: -630 mL        PHYSICAL EXAM:  HEAD:  Atraumatic, Normocephalic  NECK: Supple, No   JVD  CHEST/LUNG: b/ l rhonchi  HEART: Regular rate and rhythm;         murmur  ABDOMEN: Soft, Nontender, ;   EXTREMITIES:    mild    edema  NEUROLOGY:  alert    LABS:          PT/INR - ( 10 Rodrigo 2020 08:56 )   PT: 20.2 sec;   INR: 1.73 ratio                         Thyroid Stimulating Hormone, Serum: 6.79 uIU/mL (12-20 @ 09:12)          Consultant(s) Notes Reviewed:      Care Discussed with Consultants/Other Providers:

## 2020-01-11 NOTE — PROGRESS NOTE ADULT - SUBJECTIVE AND OBJECTIVE BOX
SUBJECTIVE AND OBJECTIVE: pt sitting in chair sleeping in NAD with family at bedside.    INTERVAL HPI/OVERNIGHT EVENTS:   1/10: on bipap tolerating well  1/11: refusing BiPAP, and wants to be on NC    DNR on chart:   Allergies    No Known Allergies    Intolerances    MEDICATIONS  (STANDING):  albuterol/ipratropium for Nebulization 3 milliLiter(s) Nebulizer every 6 hours  bisacodyl 5 milliGRAM(s) Oral at bedtime  buDESOnide    Inhalation Suspension 0.5 milliGRAM(s) Inhalation two times a day  buMETAnide Injectable 1 milliGRAM(s) IV Push daily  chlorhexidine 2% Cloths 1 Application(s) Topical daily  diltiazem    Tablet 60 milliGRAM(s) Oral every 6 hours  guaiFENesin   Syrup  (Sugar-Free) 200 milliGRAM(s) Oral every 6 hours  hydrALAZINE 25 milliGRAM(s) Oral two times a day  isosorbide   mononitrate ER Tablet (IMDUR) 30 milliGRAM(s) Oral daily  levalbuterol Inhalation 0.63 milliGRAM(s) Inhalation every 6 hours  levothyroxine 88 MICROGram(s) Oral daily  methylPREDNISolone sodium succinate Injectable 20 milliGRAM(s) IV Push two times a day  senna 2 Tablet(s) Oral at bedtime  sodium chloride 7% Inhalation 5 milliLiter(s) Inhalation every 6 hours  warfarin 4 milliGRAM(s) Oral once    MEDICATIONS  (PRN):  acetaminophen   Tablet .. 650 milliGRAM(s) Oral every 8 hours PRN Mild Pain (1 - 3)  HYDROmorphone  Injectable 0.2 milliGRAM(s) IV Push every 2 hours PRN dyspnea and/or severe pain  LORazepam   Injectable 0.5 milliGRAM(s) IV Push every 2 hours PRN anxiety and/or agitation  traMADol 25 milliGRAM(s) Oral two times a day PRN Severe Pain (7 - 10)      ITEMS UNCHECKED ARE NOT PRESENT    PRESENT SYMPTOMS: [ x]Unable to obtain due to pt sleeping  Source if other than patient:  [ ]Family   [ ]Team     Pain:  [ ]yes [ ]no  QOL impact -   Location -           Aggravating factors - Quality -   Radiation -   Timing-   Severity (0-10 scale):   Minimal acceptable level (0-10 scale):     Dyspnea:                           [ x]Mild [ ]Moderate [ ]Severe  Anxiety:                             [ ]Mild [ ]Moderate [ ]Severe  Fatigue:                             [x ]Mild [ ]Moderate [ ]Severe  Nausea:                            [ ]Mild [ ]Moderate [ ]Severe  Loss of appetite:              [ ]Mild [ ]Moderate [ ]Severe  Constipation:                    [ ]Mild [ ]Moderate [ ]Severe    PAIN AD Score:	  [email or web address].[email or web address]/painad.pdf (Ctrl + left click to view)    Other Symptoms:  [ ]All other review of systems negative     Palliative Performance Status Version 2:   40      %      http://npcrc.org/files/news/palliative_performance_scale_[email or web address].pdf    PHYSICAL EXAM:  Vital Signs Last 24 Hrs  T(C): 36.7 (11 Jan 2020 14:18), Max: 36.7 (11 Jan 2020 14:18)  T(F): 98.1 (11 Jan 2020 14:18), Max: 98.1 (11 Jan 2020 14:18)  HR: 88 (11 Jan 2020 14:18) (68 - 96)  BP: 107/69 (11 Jan 2020 14:18) (107/69 - 141/70)  BP(mean): --  RR: 18 (11 Jan 2020 14:18) (18 - 20)  SpO2: 97% (11 Jan 2020 14:18) (96% - 100%)     Limited exam for patient resting  GENERAL:  [x ]Alert  [x ]Oriented x 3  [ ]Lethargic  [ ]Cachexia  [ ]Unarousable  [x ]Verbal  [ ]Non-Verbal    Behavioral:   [ ]Anxiety  [ ]Delirium [ ]Agitation [ ]Other    HEENT:  [ ]Normal   [x ]Dry mouth   [ ]ET Tube/Trach  [ ]Oral lesions    PULMONARY:   [ ]Clear [ ]Tachypnea  [ ]Audible excessive secretions [x] No labored breathing  [ ]Rhonchi        [ ]Right [ ]Left [ ]Bilateral  [ ]Crackles        [ ]Right [ ]Left [ ]Bilateral  [ ]Wheezing     [ ]Right [ ]Left [ ]Bilateral  [ ]Diminished BS [ ] Right [ ]Left [ ]Bilateral at bases     CARDIOVASCULAR:    [ ]Regular [ ]Irregular [ ]Tachy  [ ]Mak [ ]Murmur [ ]Other    GASTROINTESTINAL:  [ ]Soft  [ ]Distended   [ ]+BS  [ ]Non tender [ ]Tender  [ ]PEG [ ]OGT/ NGT   Last BM:      GENITOURINARY:  [ ]Normal [x ]Incontinent   [ ]Oliguria/Anuria   [ ]Shaw   [ ] External cath    MUSCULOSKELETAL:   [x ]Normal   [ ]Weakness  [ ]Bed/Wheelchair bound [ ]Edema    NEUROLOGIC:   [x ]No focal deficits  [ ] Cognitive impairment  [ ] Dysphagia [ ]Dysarthria [ ] Paresis [ ]Other     SKIN:   [ x]Normal  [ ]Rash   [ ]Pressure ulcer(s) [x ]y [ ]n present on admission  [x] other - sternal chest wound    CRITICAL CARE:  [ ]Shock Present  [ ]Septic [ ]Cardiogenic [ ]Neurologic [ ]Hypovolemic  [ ]Vasopressors [ ]Inotropes  [x ]Respiratory failure present [ ]Mechanical Ventilation [x ]Non-invasive ventilatory support [ ]High-Flow  [x ]Acute  [ ]Chronic [ ]Hypoxic  [ ]Hypercarbic [ ]Other  [ ]Other organ failure     LABS:    no labs for review    RADIOLOGY & ADDITIONAL STUDIES:    Protein Calorie Malnutrition Present: [ ]mild [ ]moderate [ ]severe [ ]underweight [ ]morbid obesity  https://www.andeal.org/vault/2440/web/files/ONC/Table_Clinical%20Characteristics%20to%20Document%20Malnutrition-White%20JV%20et%20al%098805.pdf    Height (cm): 157.5 (01-06-20 @ 16:24), 172.7 (12-19-19 @ 23:17)  Weight (kg): 75.7 (01-06-20 @ 16:24), 72.1 (12-19-19 @ 23:17)  BMI (kg/m2): 30.5 (01-06-20 @ 16:24), 24.2 (12-19-19 @ 23:17)    [ ]PPSV2 < or = 30%  [ ]significant weight loss [ ]poor nutritional intake [ ]anasarca   Albumin, Serum: 4.0 g/dL (01-05-20 @ 15:05)   [ ]Artificial Nutrition    REFERRALS:   [ ]Chaplaincy  [ ]Hospice  [ ]Child Life  [ ]Social Work  [ ]Case management [ ]Holistic Therapy     Goals of Care Document:

## 2020-01-11 NOTE — PROGRESS NOTE ADULT - ASSESSMENT
89F pmhx of CHF, pulm htn, on home o2, Afib, Pacemaker, HTN. P/w sob from rehab at [facility].  Likely due to acute on chronic CHF.  Palliative care called for GOC by dtr.

## 2020-01-12 PROCEDURE — 99233 SBSQ HOSP IP/OBS HIGH 50: CPT

## 2020-01-12 PROCEDURE — 99232 SBSQ HOSP IP/OBS MODERATE 35: CPT

## 2020-01-12 RX ADMIN — HYDROMORPHONE HYDROCHLORIDE 0.2 MILLIGRAM(S): 2 INJECTION INTRAMUSCULAR; INTRAVENOUS; SUBCUTANEOUS at 07:02

## 2020-01-12 RX ADMIN — Medication 0.5 MILLIGRAM(S): at 04:30

## 2020-01-12 RX ADMIN — HYDROMORPHONE HYDROCHLORIDE 0.2 MILLIGRAM(S): 2 INJECTION INTRAMUSCULAR; INTRAVENOUS; SUBCUTANEOUS at 11:20

## 2020-01-12 NOTE — PROGRESS NOTE ADULT - SUBJECTIVE AND OBJECTIVE BOX
on oxygen/ wa s refusing bipap  REVIEW OF SYSTEMS:  GEN: no fever,    no chills  RESP:  SOB,   no cough  CVS: no chest pain,   no palpitations  GI: no abdominal pain,   no nausea,   no vomiting,   no constipation,   no diarrhea  : no dysuria,   no frequency  NEURO: no headache,   no dizziness  PSYCH: no depression,   not anxious  Derm : no rash    MEDICATIONS  (STANDING):  albuterol/ipratropium for Nebulization 3 milliLiter(s) Nebulizer every 6 hours  bisacodyl 5 milliGRAM(s) Oral at bedtime  buDESOnide    Inhalation Suspension 0.5 milliGRAM(s) Inhalation two times a day  buMETAnide Injectable 1 milliGRAM(s) IV Push daily  chlorhexidine 2% Cloths 1 Application(s) Topical daily  diltiazem    Tablet 60 milliGRAM(s) Oral every 6 hours  guaiFENesin   Syrup  (Sugar-Free) 200 milliGRAM(s) Oral every 6 hours  hydrALAZINE 25 milliGRAM(s) Oral two times a day  isosorbide   mononitrate ER Tablet (IMDUR) 30 milliGRAM(s) Oral daily  levalbuterol Inhalation 0.63 milliGRAM(s) Inhalation every 6 hours  levothyroxine 88 MICROGram(s) Oral daily  methylPREDNISolone sodium succinate Injectable 20 milliGRAM(s) IV Push two times a day  senna 2 Tablet(s) Oral at bedtime  sodium chloride 7% Inhalation 5 milliLiter(s) Inhalation every 6 hours    MEDICATIONS  (PRN):  acetaminophen   Tablet .. 650 milliGRAM(s) Oral every 8 hours PRN Mild Pain (1 - 3)  HYDROmorphone  Injectable 0.2 milliGRAM(s) IV Push every 2 hours PRN dyspnea and/or severe pain  LORazepam   Injectable 0.5 milliGRAM(s) IV Push every 2 hours PRN anxiety and/or agitation  traMADol 25 milliGRAM(s) Oral two times a day PRN Severe Pain (7 - 10)      Vital Signs Last 24 Hrs  T(C): 36.7 (11 Jan 2020 14:18), Max: 36.7 (11 Jan 2020 14:18)  T(F): 98.1 (11 Jan 2020 14:18), Max: 98.1 (11 Jan 2020 14:18)  HR: 96 (12 Jan 2020 06:14) (80 - 96)  BP: 107/69 (11 Jan 2020 14:18) (107/69 - 131/74)  BP(mean): --  RR: 18 (11 Jan 2020 14:18) (18 - 18)  SpO2: 93% (12 Jan 2020 06:14) (93% - 97%)  CAPILLARY BLOOD GLUCOSE        I&O's Summary    11 Jan 2020 07:01  -  12 Jan 2020 07:00  --------------------------------------------------------  IN: 460 mL / OUT: 500 mL / NET: -40 mL        PHYSICAL EXAM:  HEAD:  Atraumatic, Normocephalic  NECK: Supple, No   JVD  CHEST/LUNG:     rhonchi  HEART: Regular rate and rhythm;         murmur  ABDOMEN: Soft, Nontender, ;   EXTREMITIES:  less      edema  NEUROLOGY:  alert    LABS:          PT/INR - ( 11 Jan 2020 09:58 )   PT: 26.8 sec;   INR: 2.29 ratio                         Thyroid Stimulating Hormone, Serum: 6.79 uIU/mL (12-20 @ 09:12)          Consultant(s) Notes Reviewed:      Care Discussed with Consultants/Other Providers:

## 2020-01-12 NOTE — PROGRESS NOTE ADULT - ASSESSMENT
89 yo F      with   h/o      DVT.  A FIB on Coumadin,     SSX,  s/p   PPM .    HTN,  hypothyroid  old  menigioma on ct.        recne  admission on 12/ 19,  for,  from mlple rib fx's/  Left  7 to  10 th  ribs. 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 on Coumadin.  daily  inr  echo,  12/24/ 19,   mod  to severe   AS/  severe TR/  RV  dysfunction/ high LV filing pressure    on  cardizem/ hydralazine/bumex/  at  n home /    iv  bumex/     still with  tachypnea. , has impeoved    AFIB,      coumadin per  inr/ pending   s/p  acute resp failure on 1/ 7, / needing bipap,    ct  chest,   mucous plugging/  airway  clearance/  acapella  RAAD, resolved/     pt  on bipap/  decompensates/ tachypneic,   when taken off  even  for short periods   family meeting with  palliative  care noted/  awaiting transfer  to  PCU    wheezing   on iv  solumedrol /  awaiting   transfer to  PCU/ on Dilaudid/  ativan, prn       per  daughter/ pts  dr little  in [facility] / dr bonner,  [facility]      < from: CT Chest No Cont (01.08.20 @ 12:38) >  IMPRESSION:   Small bilateral pleural effusions.   Areas of mucous plugging in the right middle and bilateral lower lobes with associated atelectasis.  < end of copied text >     < from: Transthoracic Echocardiogram (12.24.19 @ 09:49) >   Mild mitral regurgitation.  2. Calcified trileaflet aortic valve with decreased  opening. Peak transaortic valve gradient equals 34 mm Hg,  mean transaortic valve gradient equals 22 mm Hg, estimated  aortic valve area equals 1.3 sqcm (by continuity equation),  aortic valve velocity time integral equals 56 cm,  consistent with moderate to severe aortic stenosis  (severity may be underestimated). Mild aortic  regurgitation.  3. Severely dilated left atrium.  LA volume index = 75  cc/m2.  4. Increased relative wall thickness with normal left  ventricular mass index, consistent with concentric left  ventricular remodeling.  5. Normal left ventricular systolic function. No segmental  wall motion abnormalities. Septal flattening consistent  with right ventricular overload. Septal motion consistent  with conduction abnormality or pacing.  6. Increased E/e'  is consistent with elevated left  ventricular filling pressure.  7. Severe right atrial enlargement.  8. Right ventricular enlargement with decreased right  ventricular systolic function. A device wire is noted in  the right heart.  9. Malcoaptation of the tricuspid valve leaflets. Severe  tricuspid regurgitation.    < end of copied text >     < from: CT Head No Cont (12.19.19 @ 21:11) >  IMPRESSION: No acute intracranial hemorrhage.  Unchanged appearing right occipital convexity calcified/ossified meningioma.  Similar-appearing microvascular disease.  < end of copied text >     < from: CT Angio Abdomen and Pelvis w/ IV Cont (12.19.19 @ 17:30) >  BONES: Acute left seventh through 10th rib fractures. Age-indeterminate T6 compression fracture. Spinal degenerative changes. Mild retrolisthesis of L1 on L2 and mild anterolisthesis of L4 and L5.  IMPRESSION: Evidence of mild peripheral interstitial pulmonary edema.  Acute left seventh through 10th rib fractures.  OLGA SADLER M.D., RADIOLOGY RESIDENT

## 2020-01-12 NOTE — PROGRESS NOTE ADULT - SUBJECTIVE AND OBJECTIVE BOX
CARDIOLOGY     PROGRESS  NOTE   ________________________________________________    CHIEF COMPLAINT:Patient is a 89y old  Female who presents with a chief complaint of sob (12 Jan 2020 08:44)  comfortable.  	  REVIEW OF SYSTEMS:  CONSTITUTIONAL: No fever, weight loss, or fatigue  EYES: No eye pain, visual disturbances, or discharge  ENT:  No difficulty hearing, tinnitus, vertigo; No sinus or throat pain  NECK: No pain or stiffness  RESPIRATORY: No cough, wheezing, chills or hemoptysis; + Shortness of Breath  CARDIOVASCULAR: No chest pain, palpitations, passing out, dizziness, or leg swelling  GASTROINTESTINAL: No abdominal or epigastric pain. No nausea, vomiting, or hematemesis; No diarrhea or constipation. No melena or hematochezia.  GENITOURINARY: No dysuria, frequency, hematuria, or incontinence  NEUROLOGICAL: No headaches, memory loss, loss of strength, numbness, or tremors  SKIN: No itching, burning, rashes, or lesions   LYMPH Nodes: No enlarged glands  ENDOCRINE: No heat or cold intolerance; No hair loss  MUSCULOSKELETAL: No joint pain or swelling; No muscle, back, or extremity pain  PSYCHIATRIC: No depression, anxiety, mood swings, or difficulty sleeping  HEME/LYMPH: No easy bruising, or bleeding gums  ALLERGY AND IMMUNOLOGIC: No hives or eczema	    [ ] All others negative	  [ ] Unable to obtain    PHYSICAL EXAM:  T(C): 36.7 (01-11-20 @ 14:18), Max: 36.7 (01-11-20 @ 14:18)  HR: 96 (01-12-20 @ 06:14) (80 - 96)  BP: 107/69 (01-11-20 @ 14:18) (107/69 - 131/74)  RR: 18 (01-11-20 @ 14:18) (18 - 18)  SpO2: 93% (01-12-20 @ 06:14) (93% - 97%)  Wt(kg): --  I&O's Summary    11 Jan 2020 07:01  -  12 Jan 2020 07:00  --------------------------------------------------------  IN: 460 mL / OUT: 500 mL / NET: -40 mL        Appearance: Normal	  HEENT:   Normal oral mucosa, PERRL, EOMI	  Lymphatic: No lymphadenopathy  Cardiovascular: Normal S1 S2, No JVD, + murmurs, No edema  Respiratory: decrease bs bl  Psychiatry: A & O x 3, Mood & affect appropriate  Gastrointestinal:  Soft, Non-tender, + BS	  Skin: No rashes, No ecchymoses, No cyanosis	  Neurologic: Non-focal  Extremities: Normal range of motion, No clubbing, cyanosis or edema  Vascular: Peripheral pulses palpable 2+ bilaterally    MEDICATIONS  (STANDING):  albuterol/ipratropium for Nebulization 3 milliLiter(s) Nebulizer every 6 hours  bisacodyl 5 milliGRAM(s) Oral at bedtime  buDESOnide    Inhalation Suspension 0.5 milliGRAM(s) Inhalation two times a day  buMETAnide Injectable 1 milliGRAM(s) IV Push daily  chlorhexidine 2% Cloths 1 Application(s) Topical daily  diltiazem    Tablet 60 milliGRAM(s) Oral every 6 hours  guaiFENesin   Syrup  (Sugar-Free) 200 milliGRAM(s) Oral every 6 hours  hydrALAZINE 25 milliGRAM(s) Oral two times a day  isosorbide   mononitrate ER Tablet (IMDUR) 30 milliGRAM(s) Oral daily  levalbuterol Inhalation 0.63 milliGRAM(s) Inhalation every 6 hours  levothyroxine 88 MICROGram(s) Oral daily  methylPREDNISolone sodium succinate Injectable 20 milliGRAM(s) IV Push two times a day  senna 2 Tablet(s) Oral at bedtime  sodium chloride 7% Inhalation 5 milliLiter(s) Inhalation every 6 hours      TELEMETRY: 	    ECG:  	  RADIOLOGY:  OTHER: 	  	  LABS:	 	    CARDIAC MARKERS:                  proBNP: Serum Pro-Brain Natriuretic Peptide: 8930 pg/mL (01-05 @ 15:05)  Serum Pro-Brain Natriuretic Peptide: 5543 pg/mL (12-19 @ 13:19)    Lipid Profile:   HgA1c:   TSH: Thyroid Stimulating Hormone, Serum: 6.79 uIU/mL (12-20 @ 09:12)    PT/INR - ( 11 Jan 2020 09:58 )   PT: 26.8 sec;   INR: 2.29 ratio               Assessment and plan  ---------------------------  pt seen ad discussed with palliative care and family yesterday , no treatment  awaiting palliative care admission  will sign off

## 2020-01-12 NOTE — PROGRESS NOTE ADULT - PROBLEM SELECTOR PLAN 4
- awaiting bed in PCU  - c/w ativan and dilaudid, used 2 ativan/24 hours  - d/c telemetry, blood draws, coumadin per family wishes; d/w attending

## 2020-01-12 NOTE — PROGRESS NOTE ADULT - ASSESSMENT
89F pmhx of CHF, pulm htn, on home o2, Afib, Pacemaker, HTN. P/w sob from rehab at [facility].  Likely due to acute on chronic CHF.  Palliative care called for GOC by dtr.

## 2020-01-12 NOTE — PROGRESS NOTE ADULT - SUBJECTIVE AND OBJECTIVE BOX
SUBJECTIVE AND OBJECTIVE: pt sitting in chair sleeping in NAD with family at bedside.    INTERVAL HPI/OVERNIGHT EVENTS:   1/10: on bipap tolerating well  1/11: refusing BiPAP, and wants to be on NC  1/12: refusing BiPAP, wants to be off telemetry as well; d/w family, focus is to be on symptom-directed care    DNR on chart:   Allergies    No Known Allergies    Intolerances    MEDICATIONS  (STANDING):  albuterol/ipratropium for Nebulization 3 milliLiter(s) Nebulizer every 6 hours  bisacodyl 5 milliGRAM(s) Oral at bedtime  buDESOnide    Inhalation Suspension 0.5 milliGRAM(s) Inhalation two times a day  buMETAnide Injectable 1 milliGRAM(s) IV Push daily  chlorhexidine 2% Cloths 1 Application(s) Topical daily  diltiazem    Tablet 60 milliGRAM(s) Oral every 6 hours  guaiFENesin   Syrup  (Sugar-Free) 200 milliGRAM(s) Oral every 6 hours  hydrALAZINE 25 milliGRAM(s) Oral two times a day  isosorbide   mononitrate ER Tablet (IMDUR) 30 milliGRAM(s) Oral daily  levalbuterol Inhalation 0.63 milliGRAM(s) Inhalation every 6 hours  levothyroxine 88 MICROGram(s) Oral daily  methylPREDNISolone sodium succinate Injectable 20 milliGRAM(s) IV Push two times a day  senna 2 Tablet(s) Oral at bedtime  sodium chloride 7% Inhalation 5 milliLiter(s) Inhalation every 6 hours    MEDICATIONS  (PRN):  acetaminophen   Tablet .. 650 milliGRAM(s) Oral every 8 hours PRN Mild Pain (1 - 3)  HYDROmorphone  Injectable 0.2 milliGRAM(s) IV Push every 2 hours PRN dyspnea and/or severe pain  LORazepam   Injectable 0.5 milliGRAM(s) IV Push every 2 hours PRN anxiety and/or agitation  traMADol 25 milliGRAM(s) Oral two times a day PRN Severe Pain (7 - 10)        ITEMS UNCHECKED ARE NOT PRESENT    PRESENT SYMPTOMS: [ x]Unable to obtain due to pt sleeping  Source if other than patient:  [ ]Family   [ ]Team     Pain:  [ ]yes [ ]no  QOL impact -   Location -           Aggravating factors - Quality -   Radiation -   Timing-   Severity (0-10 scale):   Minimal acceptable level (0-10 scale):     Dyspnea:                           [ x]Mild [ ]Moderate [ ]Severe  Anxiety:                             [ ]Mild [ ]Moderate [ ]Severe  Fatigue:                             [x ]Mild [ ]Moderate [ ]Severe  Nausea:                            [ ]Mild [ ]Moderate [ ]Severe  Loss of appetite:              [ ]Mild [ ]Moderate [ ]Severe  Constipation:                    [ ]Mild [ ]Moderate [ ]Severe    PAIN AD Score:	  [email or web address]/painad.pdf (Ctrl + left click to view)    Other Symptoms:  [ ]All other review of systems negative     Palliative Performance Status Version 2:   40      %      [email or web address]/[email or web address]_performance_scale_[email or web address]    PHYSICAL EXAM:  Vital Signs Last 24 Hrs  T(C): 36.7 (11 Jan 2020 14:18), Max: 36.7 (11 Jan 2020 14:18)  T(F): 98.1 (11 Jan 2020 14:18), Max: 98.1 (11 Jan 2020 14:18)  HR: 95 (12 Jan 2020 09:52) (88 - 96)  BP: 107/69 (11 Jan 2020 14:18) (107/69 - 107/69)  BP(mean): --  RR: 18 (11 Jan 2020 14:18) (18 - 18)  SpO2: 94% (12 Jan 2020 09:52) (93% - 97%)     GENERAL:  [x ]Alert  [x ]Oriented x 3  [ ]Lethargic  [ ]Cachexia  [ ]Unarousable  [x ]Verbal  [ ]Non-Verbal    Behavioral:   [ ]Anxiety  [ ]Delirium [ ]Agitation [ ]Other    HEENT:  [ ]Normal   [x ]Dry mouth   [ ]ET Tube/Trach  [ ]Oral lesions    PULMONARY:   [ X]Clear [ ]Tachypnea  [ ]Audible excessive secretions [x] No labored breathing  [ ]Rhonchi        [ ]Right [ ]Left [ ]Bilateral  [ ]Crackles        [ ]Right [ ]Left [ ]Bilateral  [ ]Wheezing     [ ]Right [ ]Left [ ]Bilateral  [ ]Diminished BS [ ] Right [ ]Left [ ]Bilateral at bases     CARDIOVASCULAR:    [ X]Regular [ ]Irregular [ ]Tachy  [ ]Mak [ ]Murmur [ ]Other    GASTROINTESTINAL:  [ X]Soft  [ ]Distended   [X ]+BS  X[ ]Non tender [ ]Tender  [ ]PEG [ ]OGT/ NGT   Last BM:      GENITOURINARY:  [ ]Normal [x ]Incontinent   [ ]Oliguria/Anuria   [ ]Shaw   [ ] External cath    MUSCULOSKELETAL:   [x ]Normal   [ ]Weakness  [ ]Bed/Wheelchair bound [ ]Edema    NEUROLOGIC:   [x ]No focal deficits  [ ] Cognitive impairment  [ ] Dysphagia [ ]Dysarthria [ ] Paresis [ ]Other     SKIN:   [ x]Normal  [ ]Rash   [ ]Pressure ulcer(s) [x ]y [ ]n present on admission  [x] other - sternal chest wound    CRITICAL CARE:  [ ]Shock Present  [ ]Septic [ ]Cardiogenic [ ]Neurologic [ ]Hypovolemic  [ ]Vasopressors [ ]Inotropes  [x ]Respiratory failure present [ ]Mechanical Ventilation [x ]Non-invasive ventilatory support [ ]High-Flow  [x ]Acute  [ ]Chronic [ ]Hypoxic  [ ]Hypercarbic [ ]Other  [ ]Other organ failure     LABS:    no more lab draws    RADIOLOGY & ADDITIONAL STUDIES:    Protein Calorie Malnutrition Present: [ ]mild [ ]moderate [ ]severe [ ]underweight [ ]morbid obesity  https://www.andeal.org/vault/2440/web/files/ONC/Table_Clinical%20Characteristics%20to%20Document%20Malnutrition-White%20JV%20et%20al%834655.pdf    Height (cm): 157.5 (01-06-20 @ 16:24), 172.7 (12-19-19 @ 23:17)  Weight (kg): 75.7 (01-06-20 @ 16:24), 72.1 (12-19-19 @ 23:17)  BMI (kg/m2): 30.5 (01-06-20 @ 16:24), 24.2 (12-19-19 @ 23:17)    [ ]PPSV2 < or = 30%  [ ]significant weight loss [ ]poor nutritional intake [ ]anasarca   Albumin, Serum: 4.0 g/dL (01-05-20 @ 15:05)   [ ]Artificial Nutrition    REFERRALS:   [ ]Chaplaincy  [ ]Hospice  [ ]Child Life  [ ]Social Work  [ ]Case management [ ]Holistic Therapy     Goals of Care Document:

## 2020-01-12 NOTE — PROGRESS NOTE ADULT - PROBLEM SELECTOR PLAN 1
- on NC  - now refusing BiPAP and telemetry  - dilaudid 0.2mg Q2 PRN dyspnea, used 3 in last 24 hours, appears comfortable

## 2020-01-13 VITALS
DIASTOLIC BLOOD PRESSURE: 85 MMHG | HEART RATE: 101 BPM | RESPIRATION RATE: 18 BRPM | SYSTOLIC BLOOD PRESSURE: 146 MMHG | OXYGEN SATURATION: 96 % | TEMPERATURE: 98 F

## 2020-01-13 PROCEDURE — 99232 SBSQ HOSP IP/OBS MODERATE 35: CPT

## 2020-01-13 PROCEDURE — 99233 SBSQ HOSP IP/OBS HIGH 50: CPT

## 2020-01-13 RX ORDER — WARFARIN SODIUM 2.5 MG/1
3 TABLET ORAL ONCE
Refills: 0 | Status: DISCONTINUED | OUTPATIENT
Start: 2020-01-13 | End: 2020-01-13

## 2020-01-13 RX ADMIN — HYDROMORPHONE HYDROCHLORIDE 0.2 MILLIGRAM(S): 2 INJECTION INTRAMUSCULAR; INTRAVENOUS; SUBCUTANEOUS at 20:58

## 2020-01-13 RX ADMIN — HYDROMORPHONE HYDROCHLORIDE 0.2 MILLIGRAM(S): 2 INJECTION INTRAMUSCULAR; INTRAVENOUS; SUBCUTANEOUS at 06:40

## 2020-01-13 RX ADMIN — HYDROMORPHONE HYDROCHLORIDE 0.2 MILLIGRAM(S): 2 INJECTION INTRAMUSCULAR; INTRAVENOUS; SUBCUTANEOUS at 17:26

## 2020-01-13 RX ADMIN — TRAMADOL HYDROCHLORIDE 25 MILLIGRAM(S): 50 TABLET ORAL at 07:46

## 2020-01-13 RX ADMIN — HYDROMORPHONE HYDROCHLORIDE 0.2 MILLIGRAM(S): 2 INJECTION INTRAMUSCULAR; INTRAVENOUS; SUBCUTANEOUS at 09:43

## 2020-01-13 RX ADMIN — HYDROMORPHONE HYDROCHLORIDE 0.2 MILLIGRAM(S): 2 INJECTION INTRAMUSCULAR; INTRAVENOUS; SUBCUTANEOUS at 16:56

## 2020-01-13 RX ADMIN — HYDROMORPHONE HYDROCHLORIDE 0.2 MILLIGRAM(S): 2 INJECTION INTRAMUSCULAR; INTRAVENOUS; SUBCUTANEOUS at 09:13

## 2020-01-13 RX ADMIN — Medication 0.5 MILLIGRAM(S): at 22:32

## 2020-01-13 NOTE — PROGRESS NOTE ADULT - PROBLEM SELECTOR PLAN 1
- on NC  - off BiPAP and telemetry per patient request  - dilaudid 0.2mg Q2 PRN dyspnea, used 2 in last 24 hours, appears comfortable

## 2020-01-13 NOTE — PROGRESS NOTE ADULT - PROBLEM SELECTOR PLAN 4
- awaiting bed in PCU  - c/w ativan and dilaudid, used 0 ativan/24 hours  - d/c telemetry, blood draws, coumadin per family wishes

## 2020-01-13 NOTE — PROGRESS NOTE ADULT - SUBJECTIVE AND OBJECTIVE BOX
SUBJECTIVE AND OBJECTIVE: pt sitting in chair sleeping in NAD with family at bedside.    INTERVAL HPI/OVERNIGHT EVENTS:   1/10: on bipap tolerating well  1/11: refusing BiPAP, and wants to be on NC  1/12: refusing BiPAP, wants to be off telemetry as well; d/w family, focus is to be on symptom-directed care  1/13: appears to have mildly labored breathing, on NC, daughter @ bedside    DNR on chart:   Allergies    No Known Allergies    Intolerances    MEDICATIONS  (STANDING):  albuterol/ipratropium for Nebulization 3 milliLiter(s) Nebulizer every 6 hours  bisacodyl 5 milliGRAM(s) Oral at bedtime  buDESOnide    Inhalation Suspension 0.5 milliGRAM(s) Inhalation two times a day  buMETAnide Injectable 1 milliGRAM(s) IV Push daily  chlorhexidine 2% Cloths 1 Application(s) Topical daily  levalbuterol Inhalation 0.63 milliGRAM(s) Inhalation every 6 hours  sodium chloride 7% Inhalation 5 milliLiter(s) Inhalation every 6 hours    MEDICATIONS  (PRN):  HYDROmorphone  Injectable 0.2 milliGRAM(s) IV Push every 2 hours PRN dyspnea and/or severe pain  LORazepam   Injectable 0.5 milliGRAM(s) IV Push every 2 hours PRN anxiety and/or agitation  traMADol 25 milliGRAM(s) Oral two times a day PRN Severe Pain (7 - 10)      ITEMS UNCHECKED ARE NOT PRESENT    PRESENT SYMPTOMS: [ x]Unable to obtain due to pt sleeping  Source if other than patient:  [ ]Family   [ ]Team     Pain:  [ ]yes [ ]no  QOL impact -   Location -           Aggravating factors - Quality -   Radiation -   Timing-   Severity (0-10 scale):   Minimal acceptable level (0-10 scale):     Dyspnea:                           [ x]Mild [ ]Moderate [ ]Severe  Anxiety:                             [ ]Mild [ ]Moderate [ ]Severe  Fatigue:                             [x ]Mild [ ]Moderate [ ]Severe  Nausea:                            [ ]Mild [ ]Moderate [ ]Severe  Loss of appetite:              [ ]Mild [ ]Moderate [ ]Severe  Constipation:                    [ ]Mild [ ]Moderate [ ]Severe    PAIN AD Score:	  [email or web address]/painad.pdf (Ctrl + left click to view)    Other Symptoms:  [ ]All other review of systems negative     Palliative Performance Status Version 2:   40      %      [email or web address]/[email or web address]_[email or web address]    PHYSICAL EXAM:  Vital Signs Last 24 Hrs  T(C): 36.7 (13 Jan 2020 06:22), Max: 36.7 (13 Jan 2020 06:22)  T(F): 98.1 (13 Jan 2020 06:22), Max: 98.1 (13 Jan 2020 06:22)  HR: 101 (13 Jan 2020 06:22) (101 - 101)  BP: 146/85 (13 Jan 2020 06:22) (146/85 - 146/85)  BP(mean): --  RR: 18 (13 Jan 2020 06:22) (18 - 18)  SpO2: 96% (13 Jan 2020 06:22) (96% - 96%)    GENERAL:  [x ]Alert  [x ]Oriented x 3  [ ]Lethargic  [ ]Cachexia  [ ]Unarousable  [x ]Verbal  [ ]Non-Verbal    Behavioral:   [ ]Anxiety  [ ]Delirium [ ]Agitation [ ]Other    HEENT:  [ ]Normal   [x ]Dry mouth   [ ]ET Tube/Trach  [ ]Oral lesions    PULMONARY:   [ X]Clear [ ]Tachypnea  [ ]Audible excessive secretions [x] No labored breathing  [ ]Rhonchi        [ ]Right [ ]Left [ ]Bilateral  [ ]Crackles        [ ]Right [ ]Left [ ]Bilateral  [ ]Wheezing     [ ]Right [ ]Left [ ]Bilateral  [ ]Diminished BS [ ] Right [ ]Left [ ]Bilateral at bases     CARDIOVASCULAR:    [ X]Regular [ ]Irregular [ ]Tachy  [ ]Mka [ ]Murmur [ ]Other    GASTROINTESTINAL:  [ X]Soft  [ ]Distended   [X ]+BS  X[ ]Non tender [ ]Tender  [ ]PEG [ ]OGT/ NGT   Last BM:      GENITOURINARY:  [ ]Normal [x ]Incontinent   [ ]Oliguria/Anuria   [ ]Shaw   [ ] External cath    MUSCULOSKELETAL:   [x ]Normal   [ ]Weakness  [ ]Bed/Wheelchair bound [ ]Edema    NEUROLOGIC:   [x ]No focal deficits  [ ] Cognitive impairment  [ ] Dysphagia [ ]Dysarthria [ ] Paresis [ ]Other     SKIN:   [ x]Normal  [ ]Rash   [ ]Pressure ulcer(s) [x ]y [ ]n present on admission  [x] other - sternal chest wound    CRITICAL CARE:  [ ]Shock Present  [ ]Septic [ ]Cardiogenic [ ]Neurologic [ ]Hypovolemic  [ ]Vasopressors [ ]Inotropes  [x ]Respiratory failure present [ ]Mechanical Ventilation [x ]Non-invasive ventilatory support [ ]High-Flow  [x ]Acute  [ ]Chronic [ ]Hypoxic  [ ]Hypercarbic [ ]Other  [ ]Other organ failure     LABS:    no more lab draws    RADIOLOGY & ADDITIONAL STUDIES:    Protein Calorie Malnutrition Present: [ ]mild [ ]moderate [ ]severe [ ]underweight [ ]morbid obesity  https://www.andeal.org/vault/2440/web/files/ONC/Table_Clinical%20Characteristics%20to%20Document%20Malnutrition-White%20JV%20et%20al%491422.pdf    Height (cm): 157.5 (01-06-20 @ 16:24), 172.7 (12-19-19 @ 23:17)  Weight (kg): 75.7 (01-06-20 @ 16:24), 72.1 (12-19-19 @ 23:17)  BMI (kg/m2): 30.5 (01-06-20 @ 16:24), 24.2 (12-19-19 @ 23:17)    [ ]PPSV2 < or = 30%  [ ]significant weight loss [ ]poor nutritional intake [ ]anasarca   Albumin, Serum: 4.0 g/dL (01-05-20 @ 15:05)   [ ]Artificial Nutrition    REFERRALS:   [ ]Chaplaincy  [ ]Hospice  [ ]Child Life  [ ]Social Work  [ ]Case management [ ]Holistic Therapy     Goals of Care Document:

## 2020-01-13 NOTE — PROGRESS NOTE ADULT - ASSESSMENT
89 yo F      with   h/o      DVT.  A FIB on Coumadin,     SSX,  s/p   PPM .    HTN,  hypothyroid  old  menigioma on ct.        recne  admission on 12/ 19,  for,  from mlple rib fx's/  Left  7 to  10 th  ribs. 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 on Coumadin.  daily  inr  echo,  12/24/ 19,   mod  to severe   AS/  severe TR/  RV  dysfunction/ high LV filing pressure    on  cardizem/ hydralazine/bumex/  at  n home /    iv  bumex/     still with  tachypnea. ,  AFIB, was  on   coumadin/  stopped  now   s/p  acute resp failure on 1/ 7, / needing bipap,    ct  chest,   mucous plugging/  airway  clearance/  acapell     pt  on bipap/  decompensates/ tachypneic,   when taken off  even  for short periods   family meeting with  palliative  care noted/  awaiting transfer  to  PCU    wheezing   on iv  solumedrol /  awaiting   transfer to  PCU/ on Dilaudid/  ativan, prn  no further blood draws per   palliative care/            < from: CT Chest No Cont (01.08.20 @ 12:38) >  IMPRESSION:   Small bilateral pleural effusions.   Areas of mucous plugging in the right middle and bilateral lower lobes with associated atelectasis.  < end of copied text >     < from: Transthoracic Echocardiogram (12.24.19 @ 09:49) >   Mild mitral regurgitation.  2. Calcified trileaflet aortic valve with decreased  opening. Peak transaortic valve gradient equals 34 mm Hg,  mean transaortic valve gradient equals 22 mm Hg, estimated  aortic valve area equals 1.3 sqcm (by continuity equation),  aortic valve velocity time integral equals 56 cm,  consistent with moderate to severe aortic stenosis  (severity may be underestimated). Mild aortic  regurgitation.  3. Severely dilated left atrium.  LA volume index = 75  cc/m2.  4. Increased relative wall thickness with normal left  ventricular mass index, consistent with concentric left  ventricular remodeling.  5. Normal left ventricular systolic function. No segmental  wall motion abnormalities. Septal flattening consistent  with right ventricular overload. Septal motion consistent  with conduction abnormality or pacing.  6. Increased E/e'  is consistent with elevated left  ventricular filling pressure.  7. Severe right atrial enlargement.  8. Right ventricular enlargement with decreased right  ventricular systolic function. A device wire is noted in  the right heart.  9. Malcoaptation of the tricuspid valve leaflets. Severe  tricuspid regurgitation.    < end of copied text >     < from: CT Head No Cont (12.19.19 @ 21:11) >  IMPRESSION: No acute intracranial hemorrhage.  Unchanged appearing right occipital convexity calcified/ossified meningioma.  Similar-appearing microvascular disease.  < end of copied text >     < from: CT Angio Abdomen and Pelvis w/ IV Cont (12.19.19 @ 17:30) >  BONES: Acute left seventh through 10th rib fractures. Age-indeterminate T6 compression fracture. Spinal degenerative changes. Mild retrolisthesis of L1 on L2 and mild anterolisthesis of L4 and L5.  IMPRESSION: Evidence of mild peripheral interstitial pulmonary edema.  Acute left seventh through 10th rib fractures.  OLGA SADLER M.D., RADIOLOGY RESIDENT

## 2020-01-13 NOTE — PROGRESS NOTE ADULT - ASSESSMENT
89F pmhx of CHF, pulm htn, on home o2, Afib, Pacemaker, HTN. P/w sob from rehab at [facility].  Likely due to acute on chronic CHF.  Palliative care called for GOC by dtr.

## 2020-01-13 NOTE — PROGRESS NOTE ADULT - SUBJECTIVE AND OBJECTIVE BOX
weak    REVIEW OF SYSTEMS:  GEN: no fever,    no chills  RESP: no SOB,   no cough  CVS: no chest pain,   no palpitations  GI: no abdominal pain,   no nausea,   no vomiting,   no constipation,   no diarrhea  : no dysuria,   no frequency  NEURO: no headache,   no dizziness  PSYCH: no depression,   not anxious  Derm : no rash    MEDICATIONS  (STANDING):  albuterol/ipratropium for Nebulization 3 milliLiter(s) Nebulizer every 6 hours  bisacodyl 5 milliGRAM(s) Oral at bedtime  buDESOnide    Inhalation Suspension 0.5 milliGRAM(s) Inhalation two times a day  buMETAnide Injectable 1 milliGRAM(s) IV Push daily  chlorhexidine 2% Cloths 1 Application(s) Topical daily  diltiazem    Tablet 60 milliGRAM(s) Oral every 6 hours  guaiFENesin   Syrup  (Sugar-Free) 200 milliGRAM(s) Oral every 6 hours  hydrALAZINE 25 milliGRAM(s) Oral two times a day  isosorbide   mononitrate ER Tablet (IMDUR) 30 milliGRAM(s) Oral daily  levalbuterol Inhalation 0.63 milliGRAM(s) Inhalation every 6 hours  levothyroxine 88 MICROGram(s) Oral daily  methylPREDNISolone sodium succinate Injectable 20 milliGRAM(s) IV Push two times a day  senna 2 Tablet(s) Oral at bedtime  sodium chloride 7% Inhalation 5 milliLiter(s) Inhalation every 6 hours    MEDICATIONS  (PRN):  acetaminophen   Tablet .. 650 milliGRAM(s) Oral every 8 hours PRN Mild Pain (1 - 3)  HYDROmorphone  Injectable 0.2 milliGRAM(s) IV Push every 2 hours PRN dyspnea and/or severe pain  LORazepam   Injectable 0.5 milliGRAM(s) IV Push every 2 hours PRN anxiety and/or agitation  traMADol 25 milliGRAM(s) Oral two times a day PRN Severe Pain (7 - 10)      Vital Signs Last 24 Hrs  T(C): 36.7 (13 Jan 2020 06:22), Max: 36.7 (13 Jan 2020 06:22)  T(F): 98.1 (13 Jan 2020 06:22), Max: 98.1 (13 Jan 2020 06:22)  HR: 101 (13 Jan 2020 06:22) (101 - 116)  BP: 146/85 (13 Jan 2020 06:22) (102/66 - 146/85)  BP(mean): --  RR: 18 (13 Jan 2020 06:22) (18 - 18)  SpO2: 96% (13 Jan 2020 06:22) (93% - 96%)  CAPILLARY BLOOD GLUCOSE        I&O's Summary    12 Jan 2020 07:01  -  13 Jan 2020 07:00  --------------------------------------------------------  IN: 0 mL / OUT: 750 mL / NET: -750 mL        PHYSICAL EXAM:  HEAD:  Atraumatic, Normocephalic  NECK: Supple, No   JVD  CHEST/LUNG:   no     rales,     no,    rhonchi  HEART: Regular rate and rhythm;         murmur  ABDOMEN: Soft, Nontender, ;   EXTREMITIES:        edema  NEUROLOGY:  alert    LABS:                          Thyroid Stimulating Hormone, Serum: 6.79 uIU/mL (12-20 @ 09:12)          Consultant(s) Notes Reviewed:      Care Discussed with Consultants/Other Providers:

## 2020-01-14 PROCEDURE — 99232 SBSQ HOSP IP/OBS MODERATE 35: CPT

## 2020-01-14 PROCEDURE — 99233 SBSQ HOSP IP/OBS HIGH 50: CPT

## 2020-01-14 RX ADMIN — HYDROMORPHONE HYDROCHLORIDE 0.2 MILLIGRAM(S): 2 INJECTION INTRAMUSCULAR; INTRAVENOUS; SUBCUTANEOUS at 15:55

## 2020-01-14 RX ADMIN — HYDROMORPHONE HYDROCHLORIDE 0.2 MILLIGRAM(S): 2 INJECTION INTRAMUSCULAR; INTRAVENOUS; SUBCUTANEOUS at 18:23

## 2020-01-14 RX ADMIN — HYDROMORPHONE HYDROCHLORIDE 0.2 MILLIGRAM(S): 2 INJECTION INTRAMUSCULAR; INTRAVENOUS; SUBCUTANEOUS at 11:58

## 2020-01-14 RX ADMIN — HYDROMORPHONE HYDROCHLORIDE 0.2 MILLIGRAM(S): 2 INJECTION INTRAMUSCULAR; INTRAVENOUS; SUBCUTANEOUS at 15:56

## 2020-01-14 RX ADMIN — HYDROMORPHONE HYDROCHLORIDE 0.2 MILLIGRAM(S): 2 INJECTION INTRAMUSCULAR; INTRAVENOUS; SUBCUTANEOUS at 00:21

## 2020-01-14 RX ADMIN — HYDROMORPHONE HYDROCHLORIDE 0.2 MILLIGRAM(S): 2 INJECTION INTRAMUSCULAR; INTRAVENOUS; SUBCUTANEOUS at 11:56

## 2020-01-14 RX ADMIN — HYDROMORPHONE HYDROCHLORIDE 0.2 MILLIGRAM(S): 2 INJECTION INTRAMUSCULAR; INTRAVENOUS; SUBCUTANEOUS at 20:32

## 2020-01-14 RX ADMIN — HYDROMORPHONE HYDROCHLORIDE 0.2 MILLIGRAM(S): 2 INJECTION INTRAMUSCULAR; INTRAVENOUS; SUBCUTANEOUS at 23:39

## 2020-01-14 RX ADMIN — Medication 0.5 MILLIGRAM(S): at 14:05

## 2020-01-14 RX ADMIN — CHLORHEXIDINE GLUCONATE 1 APPLICATION(S): 213 SOLUTION TOPICAL at 11:30

## 2020-01-14 RX ADMIN — HYDROMORPHONE HYDROCHLORIDE 0.2 MILLIGRAM(S): 2 INJECTION INTRAMUSCULAR; INTRAVENOUS; SUBCUTANEOUS at 23:09

## 2020-01-14 RX ADMIN — HYDROMORPHONE HYDROCHLORIDE 0.2 MILLIGRAM(S): 2 INJECTION INTRAMUSCULAR; INTRAVENOUS; SUBCUTANEOUS at 21:10

## 2020-01-14 RX ADMIN — HYDROMORPHONE HYDROCHLORIDE 0.2 MILLIGRAM(S): 2 INJECTION INTRAMUSCULAR; INTRAVENOUS; SUBCUTANEOUS at 09:42

## 2020-01-14 NOTE — PROGRESS NOTE ADULT - SUBJECTIVE AND OBJECTIVE BOX
SUBJECTIVE AND OBJECTIVE: pt sitting in chair sleeping in NAD with family at bedside.    INTERVAL HPI/OVERNIGHT EVENTS:   1/10: on bipap tolerating well  1/11: refusing BiPAP, and wants to be on NC  1/12: refusing BiPAP, wants to be off telemetry as well; d/w family, focus is to be on symptom-directed care  1/13: appears to have mildly labored breathing, on NC, daughter at bedside  1/14: appears comfortable overall, sleeping, daughter at bedside    DNR on chart:   Allergies    No Known Allergies    Intolerances    MEDICATIONS  (STANDING):  chlorhexidine 2% Cloths 1 Application(s) Topical daily    MEDICATIONS  (PRN):  HYDROmorphone  Injectable 0.2 milliGRAM(s) IV Push every 2 hours PRN dyspnea and/or severe pain  LORazepam   Injectable 0.5 milliGRAM(s) IV Push every 2 hours PRN anxiety and/or agitation      ITEMS UNCHECKED ARE NOT PRESENT    PRESENT SYMPTOMS: [ x]Unable to obtain due to pt sleeping  Source if other than patient:  [ ]Family   [ ]Team     Pain:  [ ]yes [ ]no  QOL impact -   Location -           Aggravating factors - Quality -   Radiation -   Timing-   Severity (0-10 scale):   Minimal acceptable level (0-10 scale):     Dyspnea:                           [ x]Mild [ ]Moderate [ ]Severe  Anxiety:                             [ ]Mild [ ]Moderate [ ]Severe  Fatigue:                             [x ]Mild [ ]Moderate [ ]Severe  Nausea:                            [ ]Mild [ ]Moderate [ ]Severe  Loss of appetite:              [ ]Mild [ ]Moderate [ ]Severe  Constipation:                    [ ]Mild [ ]Moderate [ ]Severe    PAIN AD Score:	  [email or web address]/cog/painad.pdf (Ctrl + left click to view)    Other Symptoms:  [ ]All other review of systems negative     Palliative Performance Status Version 2:   40      %      http://npcrc.org/files/news/palliative_performance_scale_[email or web address].pdf    PHYSICAL EXAM:  Vital Signs Last 24 Hrs  T(C): 36.7 (13 Jan 2020 06:22), Max: 36.7 (13 Jan 2020 06:22)  T(F): 98.1 (13 Jan 2020 06:22), Max: 98.1 (13 Jan 2020 06:22)  HR: 101 (13 Jan 2020 06:22) (101 - 101)  BP: 146/85 (13 Jan 2020 06:22) (146/85 - 146/85)  BP(mean): --  RR: 18 (13 Jan 2020 06:22) (18 - 18)  SpO2: 96% (13 Jan 2020 06:22) (96% - 96%)    Limited exam for patient comfort  GENERAL:  [x ]Alert  [x ]Oriented x 3  [ ]Lethargic  [ ]Cachexia  [ ]Unarousable  [x ]Verbal  [ ]Non-Verbal    Behavioral:   [ ]Anxiety  [ ]Delirium [ ]Agitation [ ]Other    HEENT:  [ ]Normal   [x ]Dry mouth   [ ]ET Tube/Trach  [ ]Oral lesions    PULMONARY:   [ ]Clear [ ]Tachypnea  [ ]Audible excessive secretions [x] No labored breathing  [ ]Rhonchi        [ ]Right [ ]Left [ ]Bilateral  [ ]Crackles        [ ]Right [ ]Left [ ]Bilateral  [ ]Wheezing     [ ]Right [ ]Left [ ]Bilateral  [ ]Diminished BS [ ] Right [ ]Left [ ]Bilateral at bases     CARDIOVASCULAR:    [ ]Regular [ ]Irregular [ ]Tachy  [ ]Mak [ ]Murmur [ ]Other    GASTROINTESTINAL:  [ ]Soft  [ ]Distended   [ ]+BS  [ ]Non tender [ ]Tender  [ ]PEG [ ]OGT/ NGT   Last BM:      GENITOURINARY:  [ ]Normal [x ]Incontinent   [ ]Oliguria/Anuria   [ ]Shaw   [ ] External cath    MUSCULOSKELETAL:   [x ]Normal   [ ]Weakness  [ ]Bed/Wheelchair bound [ ]Edema    NEUROLOGIC:   [x ]No focal deficits  [ ] Cognitive impairment  [ ] Dysphagia [ ]Dysarthria [ ] Paresis [ ]Other     SKIN:   [ x]Normal  [ ]Rash   [ ]Pressure ulcer(s) [x ]y [ ]n present on admission  [x] other - sternal chest wound    CRITICAL CARE:  [ ]Shock Present  [ ]Septic [ ]Cardiogenic [ ]Neurologic [ ]Hypovolemic  [ ]Vasopressors [ ]Inotropes  [x ]Respiratory failure present [ ]Mechanical Ventilation [x ]Non-invasive ventilatory support [ ]High-Flow  [x ]Acute  [ ]Chronic [ ]Hypoxic  [ ]Hypercarbic [ ]Other  [ ]Other organ failure     LABS:    no more lab draws    RADIOLOGY & ADDITIONAL STUDIES:    Protein Calorie Malnutrition Present: [ ]mild [ ]moderate [ ]severe [ ]underweight [ ]morbid obesity  https://www.andeal.org/vault/2440/web/files/ONC/Table_Clinical%20Characteristics%20to%20Document%20Malnutrition-White%20JV%20et%20al%202012.pdf    Height (cm): 157.5 (01-06-20 @ 16:24), 172.7 (12-19-19 @ 23:17)  Weight (kg): 75.7 (01-06-20 @ 16:24), 72.1 (12-19-19 @ 23:17)  BMI (kg/m2): 30.5 (01-06-20 @ 16:24), 24.2 (12-19-19 @ 23:17)    [ ]PPSV2 < or = 30%  [ ]significant weight loss [ ]poor nutritional intake [ ]anasarca   Albumin, Serum: 4.0 g/dL (01-05-20 @ 15:05)   [ ]Artificial Nutrition    REFERRALS:   [ ]Chaplaincy  [ ]Hospice  [ ]Child Life  [ ]Social Work  [ ]Case management [ ]Holistic Therapy     Goals of Care Document:

## 2020-01-14 NOTE — PROGRESS NOTE ADULT - PROBLEM SELECTOR PLAN 1
- on NC  - off BiPAP and telemetry per patient request  - dilaudid 0.2mg Q2 PRN dyspnea, used 3 in last 24 hours, appears comfortable

## 2020-01-14 NOTE — PROGRESS NOTE ADULT - PROBLEM SELECTOR PLAN 4
- awaiting bed in PCU; patient refused bed overnight as she was anxious about a private room, will plan to c/w discussion tomorrow  - c/w ativan and dilaudid, used 1 ativan/24 hours  - d/c telemetry, blood draws, coumadin per family wishes  - d/w daughter

## 2020-01-14 NOTE — PROGRESS NOTE ADULT - SUBJECTIVE AND OBJECTIVE BOX
weak/     REVIEW OF SYSTEMS:  GEN: no fever,    no chills  RESP:  SOB,   no cough  CVS: no chest pain,   no palpitations  GI: no abdominal pain,   no nausea,   no vomiting,   no constipation,   no diarrhea  : no dysuria,   no frequency  NEURO: no headache,   no dizziness  PSYCH: no depression,   not anxious  Derm : no rash    MEDICATIONS  (STANDING):  albuterol/ipratropium for Nebulization 3 milliLiter(s) Nebulizer every 6 hours  bisacodyl 5 milliGRAM(s) Oral at bedtime  buDESOnide    Inhalation Suspension 0.5 milliGRAM(s) Inhalation two times a day  buMETAnide Injectable 1 milliGRAM(s) IV Push daily  chlorhexidine 2% Cloths 1 Application(s) Topical daily  levalbuterol Inhalation 0.63 milliGRAM(s) Inhalation every 6 hours  sodium chloride 7% Inhalation 5 milliLiter(s) Inhalation every 6 hours    MEDICATIONS  (PRN):  HYDROmorphone  Injectable 0.2 milliGRAM(s) IV Push every 2 hours PRN dyspnea and/or severe pain  LORazepam   Injectable 0.5 milliGRAM(s) IV Push every 2 hours PRN anxiety and/or agitation      Vital Signs Last 24 Hrs  T(C): --  T(F): --  HR: --  BP: --  BP(mean): --  RR: --  SpO2: --  CAPILLARY BLOOD GLUCOSE        I&O's Summary    13 Jan 2020 07:01  -  14 Jan 2020 07:00  --------------------------------------------------------  IN: 950 mL / OUT: 400 mL / NET: 550 mL        PHYSICAL EXAM:  HEAD:  Atraumatic, Normocephalic  NECK: Supple, No   JVD  CHEST/LUNG:   no     rales,     no,    rhonchi  HEART: Regular rate and rhythm;         murmur  ABDOMEN: Soft, Nontender, ;   EXTREMITIES:   mild     edema  NEUROLOGY:  alert    LABS:                          Thyroid Stimulating Hormone, Serum: 6.79 uIU/mL (12-20 @ 09:12)          Consultant(s) Notes Reviewed:      Care Discussed with Consultants/Other Providers:

## 2020-01-14 NOTE — PROGRESS NOTE ADULT - ASSESSMENT
89 yo F      with   h/o      DVT.  A FIB on Coumadin,     SSX,  s/p   PPM .    HTN,  hypothyroid  old  menigioma on ct.        recne  admission on 12/ 19,  for,  from mlple rib fx's/  Left  7 to  10 th  ribs. 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 on Coumadin.  daily  inr  echo,  12/24/ 19,   mod  to severe   AS/  severe TR/  RV  dysfunction/ high LV filing pressure    on  cardizem/ hydralazine/bumex/  at  n home /    iv  bumex/     still with  tachypnea. ,  AFIB, was  on   coumadin/  stopped  now   s/p  acute resp failure on 1/ 7, / needing bipap,    ct  chest,   mucous plugging/  airway  clearance/  acapell     pt  on bipap/  decompensates/ tachypneic,   when taken off  even  for short periods   family meeting with  palliative  care noted/  awaiting transfer  to  PCU    wheezing   on iv  solumedrol /  awaiting   transfer to  PCU/ on Dilaudid/  ativan, prn  no further blood draws per   palliative care/   still awaiting  transfer to PCU/ family  awaiting  eagerly  for transfer           < from: CT Chest No Cont (01.08.20 @ 12:38) >  IMPRESSION:   Small bilateral pleural effusions.   Areas of mucous plugging in the right middle and bilateral lower lobes with associated atelectasis.  < end of copied text >     < from: Transthoracic Echocardiogram (12.24.19 @ 09:49) >   Mild mitral regurgitation.  2. Calcified trileaflet aortic valve with decreased  opening. Peak transaortic valve gradient equals 34 mm Hg,  mean transaortic valve gradient equals 22 mm Hg, estimated  aortic valve area equals 1.3 sqcm (by continuity equation),  aortic valve velocity time integral equals 56 cm,  consistent with moderate to severe aortic stenosis  (severity may be underestimated). Mild aortic  regurgitation.  3. Severely dilated left atrium.  LA volume index = 75  cc/m2.  4. Increased relative wall thickness with normal left  ventricular mass index, consistent with concentric left  ventricular remodeling.  5. Normal left ventricular systolic function. No segmental  wall motion abnormalities. Septal flattening consistent  with right ventricular overload. Septal motion consistent  with conduction abnormality or pacing.  6. Increased E/e'  is consistent with elevated left  ventricular filling pressure.  7. Severe right atrial enlargement.  8. Right ventricular enlargement with decreased right  ventricular systolic function. A device wire is noted in  the right heart.  9. Malcoaptation of the tricuspid valve leaflets. Severe  tricuspid regurgitation.    < end of copied text >     < from: CT Head No Cont (12.19.19 @ 21:11) >  IMPRESSION: No acute intracranial hemorrhage.  Unchanged appearing right occipital convexity calcified/ossified meningioma.  Similar-appearing microvascular disease.  < end of copied text >     < from: CT Angio Abdomen and Pelvis w/ IV Cont (12.19.19 @ 17:30) >  BONES: Acute left seventh through 10th rib fractures. Age-indeterminate T6 compression fracture. Spinal degenerative changes. Mild retrolisthesis of L1 on L2 and mild anterolisthesis of L4 and L5.  IMPRESSION: Evidence of mild peripheral interstitial pulmonary edema.  Acute left seventh through 10th rib fractures.  OLGA SADLER M.D., RADIOLOGY RESIDENT

## 2020-01-15 DIAGNOSIS — R68.89 OTHER GENERAL SYMPTOMS AND SIGNS: ICD-10-CM

## 2020-01-15 PROCEDURE — 99233 SBSQ HOSP IP/OBS HIGH 50: CPT

## 2020-01-15 PROCEDURE — 99232 SBSQ HOSP IP/OBS MODERATE 35: CPT

## 2020-01-15 PROCEDURE — 99497 ADVNCD CARE PLAN 30 MIN: CPT | Mod: 25

## 2020-01-15 RX ORDER — ROBINUL 0.2 MG/ML
0.4 INJECTION INTRAMUSCULAR; INTRAVENOUS ONCE
Refills: 0 | Status: COMPLETED | OUTPATIENT
Start: 2020-01-15 | End: 2020-01-15

## 2020-01-15 RX ORDER — HYDROMORPHONE HYDROCHLORIDE 2 MG/ML
0.2 INJECTION INTRAMUSCULAR; INTRAVENOUS; SUBCUTANEOUS
Qty: 100 | Refills: 0 | Status: DISCONTINUED | OUTPATIENT
Start: 2020-01-15 | End: 2020-01-16

## 2020-01-15 RX ORDER — HYDROMORPHONE HYDROCHLORIDE 2 MG/ML
0.4 INJECTION INTRAMUSCULAR; INTRAVENOUS; SUBCUTANEOUS
Refills: 0 | Status: DISCONTINUED | OUTPATIENT
Start: 2020-01-15 | End: 2020-01-16

## 2020-01-15 RX ORDER — ROBINUL 0.2 MG/ML
0.4 INJECTION INTRAMUSCULAR; INTRAVENOUS EVERY 6 HOURS
Refills: 0 | Status: DISCONTINUED | OUTPATIENT
Start: 2020-01-15 | End: 2020-01-16

## 2020-01-15 RX ADMIN — HYDROMORPHONE HYDROCHLORIDE 0.2 MILLIGRAM(S): 2 INJECTION INTRAMUSCULAR; INTRAVENOUS; SUBCUTANEOUS at 07:34

## 2020-01-15 RX ADMIN — HYDROMORPHONE HYDROCHLORIDE 0.2 MILLIGRAM(S): 2 INJECTION INTRAMUSCULAR; INTRAVENOUS; SUBCUTANEOUS at 03:18

## 2020-01-15 RX ADMIN — HYDROMORPHONE HYDROCHLORIDE 0.2 MILLIGRAM(S): 2 INJECTION INTRAMUSCULAR; INTRAVENOUS; SUBCUTANEOUS at 01:16

## 2020-01-15 RX ADMIN — HYDROMORPHONE HYDROCHLORIDE 0.2 MILLIGRAM(S): 2 INJECTION INTRAMUSCULAR; INTRAVENOUS; SUBCUTANEOUS at 01:46

## 2020-01-15 RX ADMIN — HYDROMORPHONE HYDROCHLORIDE 0.2 MILLIGRAM(S): 2 INJECTION INTRAMUSCULAR; INTRAVENOUS; SUBCUTANEOUS at 05:37

## 2020-01-15 RX ADMIN — HYDROMORPHONE HYDROCHLORIDE 0.2 MILLIGRAM(S): 2 INJECTION INTRAMUSCULAR; INTRAVENOUS; SUBCUTANEOUS at 09:37

## 2020-01-15 RX ADMIN — ROBINUL 0.4 MILLIGRAM(S): 0.2 INJECTION INTRAMUSCULAR; INTRAVENOUS at 19:50

## 2020-01-15 RX ADMIN — HYDROMORPHONE HYDROCHLORIDE 0.2 MG/HR: 2 INJECTION INTRAMUSCULAR; INTRAVENOUS; SUBCUTANEOUS at 11:09

## 2020-01-15 RX ADMIN — ROBINUL 0.2 MILLIGRAM(S): 0.2 INJECTION INTRAMUSCULAR; INTRAVENOUS at 11:15

## 2020-01-15 RX ADMIN — Medication 0.5 MILLIGRAM(S): at 17:53

## 2020-01-15 RX ADMIN — Medication 0.5 MILLIGRAM(S): at 06:48

## 2020-01-15 NOTE — PROGRESS NOTE ADULT - ASSESSMENT
89 yo F      with   h/o      DVT.  A FIB on Coumadin,     SSX,  s/p   PPM .    HTN,  hypothyroid  old  menigioma on ct.        recne  admission on 12/ 19,  for,  from mlple rib fx's/  Left  7 to  10 th  ribs. 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 on Coumadin.  daily  inr  echo,  12/24/ 19,   mod  to severe   AS/  severe TR/  RV  dysfunction/ high LV filing pressure    on  cardizem/ hydralazine/bumex/  at  n home /    iv  bumex/     still with  tachypnea. ,  AFIB, was  on   coumadin/  stopped  now   s/p  acute resp failure on 1/ 7, / needing bipap,    ct  chest,   mucous plugging/  airway  clearance/  acapell     pt  on bipap/  decompensates/ tachypneic,   when taken off  even  for short periods   family meeting with  palliative  care noted/  awaiting transfer  to  PCU    wheezing   on iv  solumedrol /  awaiting   transfer to  PCU/ on Dilaudid/  ativan, prn  no further blood draws per   palliative care/     transfer to PCU/ family  awaiting  eagerly  for transfer  still awaiting transfer / bed           < from: CT Chest No Cont (01.08.20 @ 12:38) >  IMPRESSION:   Small bilateral pleural effusions.   Areas of mucous plugging in the right middle and bilateral lower lobes with associated atelectasis.  < end of copied text >     < from: Transthoracic Echocardiogram (12.24.19 @ 09:49) >   Mild mitral regurgitation.  2. Calcified trileaflet aortic valve with decreased  opening. Peak transaortic valve gradient equals 34 mm Hg,  mean transaortic valve gradient equals 22 mm Hg, estimated  aortic valve area equals 1.3 sqcm (by continuity equation),  aortic valve velocity time integral equals 56 cm,  consistent with moderate to severe aortic stenosis  (severity may be underestimated). Mild aortic  regurgitation.  3. Severely dilated left atrium.  LA volume index = 75  cc/m2.  4. Increased relative wall thickness with normal left  ventricular mass index, consistent with concentric left  ventricular remodeling.  5. Normal left ventricular systolic function. No segmental  wall motion abnormalities. Septal flattening consistent  with right ventricular overload. Septal motion consistent  with conduction abnormality or pacing.  6. Increased E/e'  is consistent with elevated left  ventricular filling pressure.  7. Severe right atrial enlargement.  8. Right ventricular enlargement with decreased right  ventricular systolic function. A device wire is noted in  the right heart.  9. Malcoaptation of the tricuspid valve leaflets. Severe  tricuspid regurgitation.    < end of copied text >     < from: CT Head No Cont (12.19.19 @ 21:11) >  IMPRESSION: No acute intracranial hemorrhage.  Unchanged appearing right occipital convexity calcified/ossified meningioma.  Similar-appearing microvascular disease.  < end of copied text >     < from: CT Angio Abdomen and Pelvis w/ IV Cont (12.19.19 @ 17:30) >  BONES: Acute left seventh through 10th rib fractures. Age-indeterminate T6 compression fracture. Spinal degenerative changes. Mild retrolisthesis of L1 on L2 and mild anterolisthesis of L4 and L5.  IMPRESSION: Evidence of mild peripheral interstitial pulmonary edema.  Acute left seventh through 10th rib fractures.  OLGA SADLER M.D., RADIOLOGY RESIDENT

## 2020-01-15 NOTE — PROGRESS NOTE ADULT - SUBJECTIVE AND OBJECTIVE BOX
weak/  REVIEW OF SYSTEMS:  GEN: no fever,    no chills  RESP: no SOB,   no cough  CVS: no chest pain,   no palpitations  GI: no abdominal pain,   no nausea,   no vomiting,   no constipation,   no diarrhea  : no dysuria,   no frequency  NEURO: no headache,   no dizziness  PSYCH: no depression,   not anxious  Derm : no rash    MEDICATIONS  (STANDING):  chlorhexidine 2% Cloths 1 Application(s) Topical daily    MEDICATIONS  (PRN):  HYDROmorphone  Injectable 0.2 milliGRAM(s) IV Push every 2 hours PRN dyspnea and/or severe pain  LORazepam   Injectable 0.5 milliGRAM(s) IV Push every 2 hours PRN anxiety and/or agitation      Vital Signs Last 24 Hrs  T(C): --  T(F): --  HR: --  BP: --  BP(mean): --  RR: --  SpO2: --  CAPILLARY BLOOD GLUCOSE        I&O's Summary    14 Jan 2020 07:01  -  15 Rodrigo 2020 07:00  --------------------------------------------------------  IN: 240 mL / OUT: 600 mL / NET: -360 mL        PHYSICAL EXAM:  HEAD:  Atraumatic, Normocephalic  NECK: Supple, No   JVD  CHEST/LUNG:   no     rales,     no,    rhonchi  HEART: Regular rate and rhythm;         murmur  ABDOMEN: Soft, Nontender, ;   EXTREMITIES:    mild    edema  NEUROLOGY:   lethargic    LABS:                          Thyroid Stimulating Hormone, Serum: 6.79 uIU/mL (12-20 @ 09:12)          Consultant(s) Notes Reviewed:      Care Discussed with Consultants/Other Providers:

## 2020-01-15 NOTE — PROGRESS NOTE ADULT - PROBLEM SELECTOR PLAN 1
- on NC  - off BiPAP and telemetry per patient request  - dilaudid 0.2mg Q2 PRN dyspnea changed to dilaudid gtt @ 0.2mg/hr  - Pt required 11 doses of dilaudid over past 24 hrs.

## 2020-01-15 NOTE — PROGRESS NOTE ADULT - SUBJECTIVE AND OBJECTIVE BOX
SUBJECTIVE AND OBJECTIVE: Pt in bed sleeping, dtr at bedside.    INTERVAL HPI/OVERNIGHT EVENTS:   1/10: on bipap tolerating well  1/11: refusing BiPAP, and wants to be on NC  1/12: refusing BiPAP, wants to be off telemetry as well; d/w family, focus is to be on symptom-directed care  1/13: appears to have mildly labored breathing, on NC, daughter at bedside  1/14: appears comfortable overall, sleeping, daughter at bedside  1/15: pt struggling now with audible secretions and dyspnea     DNR on chart:   Allergies    No Known Allergies    Intolerances  MEDICATIONS  (STANDING):  chlorhexidine 2% Cloths 1 Application(s) Topical daily  glycopyrrolate Injectable 0.4 milliGRAM(s) IV Push once  HYDROmorphone Infusion 0.2 mG/Hr (0.2 mL/Hr) IV Continuous <Continuous>  LORazepam   Injectable 0.5 milliGRAM(s) IV Push every 12 hours    MEDICATIONS  (PRN):  glycopyrrolate Injectable 0.4 milliGRAM(s) IV Push every 6 hours PRN audible secretions  HYDROmorphone  Injectable 0.4 milliGRAM(s) IV Push every 1 hour PRN breakthrough dyspnea      ITEMS UNCHECKED ARE NOT PRESENT    PRESENT SYMPTOMS: [ x]Unable to obtain due to pt sleeping  Source if other than patient:  [x ]Family   [ ]Team     Pain:  [x ]yes [ ]no  QOL impact -   Location -         ribs  Aggravating factors - Quality -  sharp  Radiation -   Timing-  with movement  Severity (0-10 scale):   Minimal acceptable level (0-10 scale):     Dyspnea:                           [ x]Mild [x ]Moderate [ ]Severe  Anxiety:                             [x ]Mild [ ]Moderate [ ]Severe  Fatigue:                             [ ]Mild [x ]Moderate [ ]Severe  Nausea:                            [ ]Mild [ ]Moderate [ ]Severe  Loss of appetite:               [ ]Mild [ ]Moderate [ ]Severe  Constipation:                    [ ]Mild [ ]Moderate [ ]Severe    PAIN AD Score:	  http://geriatrictoolkit.[employer].edu/cog/painad.pdf (Ctrl + left click to view)    Other Symptoms:  [ ]All other review of systems negative     Palliative Performance Status Version 2:   30-40      %      http://npcrc.org/files/news/palliative_performance_scale_ppsv2.pdf    PHYSICAL EXAM:  Vital Signs Last 24 Hrs  T(C): --  T(F): --  HR: --  BP: --  BP(mean): --  RR: --  SpO2: --    Limited exam for patient comfort  GENERAL:  [x ]Alert  [x ]Oriented x 3  [ ]Lethargic  [ ]Cachexia  [ ]Unarousable  [x ]Verbal  [ ]Non-Verbal    Behavioral:   [x ]mild Anxiety  [ ]Delirium [ ]Agitation [ ]Other    HEENT:  [ ]Normal   [x ]Dry mouth   [ ]ET Tube/Trach  [ ]Oral lesions    PULMONARY:   [ ]Clear [ ]Tachypnea  [x ]Audible excessive secretions [] No labored breathing  [ x]Rhonchi        [ ]Right [ ]Left [ ]Bilateral  [ ]Crackles        [ ]Right [ ]Left [ ]Bilateral  [ ]Wheezing     [ ]Right [ ]Left [ ]Bilateral  [x ]Diminished BS [ ] Right [ ]Left [x ]Bilateral at bases     CARDIOVASCULAR:    [ ]Regular [x ]Irregular [ ]Tachy  [ ]Mak [ ]Murmur [ ]Other    GASTROINTESTINAL:  [ ]Soft  [x]Distended   [x ]+BS  [ x]Non tender [ ]Tender  [ ]PEG [ ]OGT/ NGT   Last BM:      GENITOURINARY:  [ ]Normal [ ]Incontinent   [ ]Oliguria/Anuria   [ ]Shaw   [x ] External cath    MUSCULOSKELETAL:   [ ]Normal   [x ]Weakness  [ ]Bed/Wheelchair bound [ ]Edema    NEUROLOGIC:   [x ]No focal deficits  [ ] Cognitive impairment  [ ] Dysphagia [ ]Dysarthria [ ] Paresis [ ]Other     SKIN:   [ x]Normal  [ ]Rash   [ ]Pressure ulcer(s) [x ]y [ ]n present on admission  [x] other - sternal chest wound    CRITICAL CARE:  [ ]Shock Present  [ ]Septic [ ]Cardiogenic [ ]Neurologic [ ]Hypovolemic  [ ]Vasopressors [ ]Inotropes  [x ]Respiratory failure present [ ]Mechanical Ventilation [x ]Non-invasive ventilatory support [ ]High-Flow  [x ]Acute  [ ]Chronic [ ]Hypoxic  [ ]Hypercarbic [ ]Other  [ ]Other organ failure     LABS:    no more lab draws    RADIOLOGY & ADDITIONAL STUDIES:    Protein Calorie Malnutrition Present: [ ]mild [ ]moderate [ ]severe [ ]underweight [ ]morbid obesity  https://www.andeal.org/vault/1542/web/files/ONC/Table_Clinical%20Characteristics%20to%20Document%20Malnutrition-White%20JV%20et%20al%842216.pdf    Height (cm): 157.5 (01-06-20 @ 16:24), 172.7 (12-19-19 @ 23:17)  Weight (kg): 75.7 (01-06-20 @ 16:24), 72.1 (12-19-19 @ 23:17)  BMI (kg/m2): 30.5 (01-06-20 @ 16:24), 24.2 (12-19-19 @ 23:17)    [ ]PPSV2 < or = 30%  [ ]significant weight loss [ ]poor nutritional intake [ ]anasarca   Albumin, Serum: 4.0 g/dL (01-05-20 @ 15:05)   [ ]Artificial Nutrition    REFERRALS:   [x ]Chaplaincy  [ ]Hospice  [ ]Child Life  [ ]Social Work  [ ]Case management [ ]Holistic Therapy     Goals of Care Document:

## 2020-01-15 NOTE — PROGRESS NOTE ADULT - PROBLEM SELECTOR PLAN 2
- dialudid 0.2mg ivp q2h prn dyspnea changed to dilaudid gtt @ 0.2mg/hr  - dialudid 0.4 mg ivp q1h prn  - O2 via NC

## 2020-01-15 NOTE — PROGRESS NOTE ADULT - PROBLEM SELECTOR PLAN 5
Pt continues to refuse PCU bed. Meds changed as above for improved comfort with breathing and anxiety.  Met with Dtr Ramiro at bedside.  She is appropriately tearful and just wants her mom to be comfortable.  Discussed placing pt on a dilaudid gtt for dyspnea, atc ativan for anxiety and robinul for secretions..  She is aware that her mom my become less interactive and communicative but symptoms may be better controlled.  Ramiro verbalizes understanding.  Palliative will continue to follow.  Greater then 30 mins spent discussing ACP with pts dtr.

## 2020-01-16 PROCEDURE — 84132 ASSAY OF SERUM POTASSIUM: CPT

## 2020-01-16 PROCEDURE — 80048 BASIC METABOLIC PNL TOTAL CA: CPT

## 2020-01-16 PROCEDURE — 82435 ASSAY OF BLOOD CHLORIDE: CPT

## 2020-01-16 PROCEDURE — 96374 THER/PROPH/DIAG INJ IV PUSH: CPT

## 2020-01-16 PROCEDURE — 99291 CRITICAL CARE FIRST HOUR: CPT | Mod: 25

## 2020-01-16 PROCEDURE — 80053 COMPREHEN METABOLIC PANEL: CPT

## 2020-01-16 PROCEDURE — 97162 PT EVAL MOD COMPLEX 30 MIN: CPT

## 2020-01-16 PROCEDURE — 84295 ASSAY OF SERUM SODIUM: CPT

## 2020-01-16 PROCEDURE — 85730 THROMBOPLASTIN TIME PARTIAL: CPT

## 2020-01-16 PROCEDURE — 93005 ELECTROCARDIOGRAM TRACING: CPT

## 2020-01-16 PROCEDURE — 96376 TX/PRO/DX INJ SAME DRUG ADON: CPT

## 2020-01-16 PROCEDURE — 84484 ASSAY OF TROPONIN QUANT: CPT

## 2020-01-16 PROCEDURE — 85014 HEMATOCRIT: CPT

## 2020-01-16 PROCEDURE — 94640 AIRWAY INHALATION TREATMENT: CPT

## 2020-01-16 PROCEDURE — 94660 CPAP INITIATION&MGMT: CPT

## 2020-01-16 PROCEDURE — 85027 COMPLETE CBC AUTOMATED: CPT

## 2020-01-16 PROCEDURE — 85610 PROTHROMBIN TIME: CPT

## 2020-01-16 PROCEDURE — 83880 ASSAY OF NATRIURETIC PEPTIDE: CPT

## 2020-01-16 PROCEDURE — 83735 ASSAY OF MAGNESIUM: CPT

## 2020-01-16 PROCEDURE — 82803 BLOOD GASES ANY COMBINATION: CPT

## 2020-01-16 PROCEDURE — 82330 ASSAY OF CALCIUM: CPT

## 2020-01-16 PROCEDURE — 71045 X-RAY EXAM CHEST 1 VIEW: CPT

## 2020-01-16 PROCEDURE — 83605 ASSAY OF LACTIC ACID: CPT

## 2020-01-16 PROCEDURE — 82947 ASSAY GLUCOSE BLOOD QUANT: CPT

## 2020-01-16 PROCEDURE — 71250 CT THORAX DX C-: CPT

## 2020-01-16 RX ADMIN — ROBINUL 0.4 MILLIGRAM(S): 0.2 INJECTION INTRAMUSCULAR; INTRAVENOUS at 01:31

## 2020-01-16 RX ADMIN — ROBINUL 0.4 MILLIGRAM(S): 0.2 INJECTION INTRAMUSCULAR; INTRAVENOUS at 08:33

## 2020-01-16 NOTE — DISCHARGE NOTE FOR THE EXPIRED PATIENT - HOSPITAL COURSE
89 yo female,  admitted with   sob/  acute resp failure/ on bipap  in er   from acute  diastolic  chf/  elevated bnp  normal  EF/  probable severe AS/ and  severe TR ,  and  RV   dysfunction 89 yo female,  admitted with   sob/  acute resp failure/ on bipap  in er   from acute  diastolic  chf/  elevated bnp  normal  EF/  probable severe AS/ and  severe TR ,  and  RV   dysfunction  1/10: on bipap tolerating well  1/11: refusing BiPAP, and wants to be on NC  1/12: refusing BiPAP, wants to be off telemetry as well; d/w family, focus is to be on symptom-directed care  1/13: appears to have mildly labored breathing, on NC, daughter at bedside  1/14: appears comfortable overall, sleeping, daughter at bedside  1/15: pt struggling now with audible secretions and dyspnea   1/16 on dilaudid drip  . cessation of respiration s and HR @ 0908 - family present  attending notified

## 2020-07-11 NOTE — PHYSICAL THERAPY INITIAL EVALUATION ADULT - CRITERIA FOR SKILLED THERAPEUTIC INTERVENTIONS
ADT 30 order placed for [employer] with [employer].  Requested  time:1PM  If transportation does not arrive at ETA time nurse will be instructed to follow protocol for transportation below:   How can I get in touch directly with dispatch, if needed?                 · Non-emergent (stretcher): [phone]    · Emergent dispatch: [phone]    ++NURSING:  If Stretcher does not arrive at requested time please call the above Non Emergent Dispatcher.  If issue not resolved please escalate to your charge nurse for further instructions.    1430:  TN called above number as transport has not arrived as yet.  Estimated  now is 1450.       07/11/20 1214   Final Note   Assessment Type Final Discharge Note   Anticipated Discharge Disposition [facility] Follow Up  Appt(s) scheduled?   (n/a)   Discharge plans and expectations educations in teach back method with documentation complete? Yes   Right Care Referral Info   Post Acute Recommendation Other   Referral Type Hospice, inpatient   Facility Name [facility]   Post-Acute Status   Post-Acute Authorization Hospice   Hospice Status Set-up Complete   Discharge Delays (!) Patient Transportation      impairments found

## 2020-11-18 NOTE — ED ADULT NURSE NOTE - EXTENSIONS OF SELF_ADULT
FMLA forms received.  Authorization Form filled out Yes.  Forms sent to [employer] Disability Dept:  Scanned into New Forms folder  Location of paperwork drop-off: [facility]  Department:       None

## 2021-03-28 NOTE — ED PROVIDER NOTE - CONSTITUTIONAL MENTATION
Unobtainable due to clinical condition Unobtainable due to clinical condition Unobtainable due to clinical condition Unobtainable due to clinical condition Unobtainable due to clinical condition Unobtainable due to clinical condition Unobtainable due to clinical condition awake/alert

## 2021-04-02 NOTE — ED PROVIDER NOTE - CONSTITUTIONAL MOOD
appropriate Acitretin Counseling:  I discussed with the patient the risks of acitretin including but not limited to hair loss, dry lips/skin/eyes, liver damage, hyperlipidemia, depression/suicidal ideation, photosensitivity.  Serious rare side effects can include but are not limited to pancreatitis, pseudotumor cerebri, bony changes, clot formation/stroke/heart attack.  Patient understands that alcohol is contraindicated since it can result in liver toxicity and significantly prolong the elimination of the drug by many years.

## 2021-05-05 NOTE — H&P ADULT - NSHPPOACENTRALVENOUSCATHETER_GEN_ALL_CORE
Notified the patient of the below lab results. Patient verbalized understanding. Patient requesting autoimmune labs. Hx. Of primary sjogrens. States that joints are stiff and does not know if he is having a flare up. Please advise on lab orders.  Thank you no

## 2021-10-22 NOTE — PROGRESS NOTE ADULT - PROBLEM/PLAN-1
DISPLAY PLAN FREE TEXT Peng Advancement Flap Text: The defect edges were debeveled with a #15 scalpel blade.  Given the location of the defect, shape of the defect and the proximity to free margins a Peng advancement flap was deemed most appropriate.  Using a sterile surgical marker, an appropriate advancement flap was drawn incorporating the defect and placing the expected incisions within the relaxed skin tension lines where possible. The area thus outlined was incised deep to adipose tissue with a #15 scalpel blade.  The skin margins were undermined to an appropriate distance in all directions utilizing iris scissors.

## 2021-11-08 NOTE — ED ADULT TRIAGE NOTE - BP NONINVASIVE SYSTOLIC (MM HG)
Spoke with staff member at [facility], relayed that per Dr. Oconnor, they can start cannulation attempts in 2 weeks.  Today's note faxed to them as well at [phone].   172

## 2021-11-17 NOTE — CONSULT NOTE ADULT - CONSULT REASON
To nursing-- please tell patient potassium level still slightly elevated. Stop enalapril altogether. Let us know blood pressure readings at home in 5 days. Thanks. Note to self–  11/17/21–K5.2. Stop enalapril. Epistaxis

## 2023-06-14 NOTE — PROGRESS NOTE ADULT - PROVIDER SPECIALTY LIST ADULT
Cardiology Area H Indication Text: Tumors in this location are included in Area H (eyelids, eyebrows, nose, lips, chin, ear, pre-auricular, post-auricular, temple, genitalia, hands, feet, ankles and areola).  Tissue conservation is critical in these anatomic locations.

## 2023-09-18 NOTE — PATIENT PROFILE ADULT - LIVES WITH
alone Valtrex Pregnancy And Lactation Text: this medication is Pregnancy Category B and is considered safe during pregnancy. This medication is not directly found in breast milk but it's metabolite acyclovir is present.

## 2023-10-05 NOTE — ED PROVIDER NOTE - MUSCULOSKELETAL NEGATIVE STATEMENT, MLM
Sarecycline Pregnancy And Lactation Text: This medication is Pregnancy Category D and not consider safe during pregnancy. It is also excreted in breast milk. no back pain, no gout, no musculoskeletal pain, no neck pain, and no weakness.

## 2024-01-30 NOTE — ED ADULT NURSE NOTE - NS ED NOTE ABUSE SUSPICION NEGLECT YN
Retail Pharmacy Prior Authorization Team   Phone: [phone]    PA Initiation    Medication: TRULICITY 0.75 MG/0.5ML SC SOPN  Insurance Company: [employer] - Phone [phone] Fax [phone]  Pharmacy Filling the Rx: [id number] [place] [facility] [address]  Filling Pharmacy Phone: [phone]  Filling Pharmacy Fax:    Start Date: 1/30/2024         no

## 2024-01-30 NOTE — ED PROVIDER NOTE - HEAD, MLM
[Chaperone Present] : A chaperone was present in the examining room during all aspects of the physical examination [Abnormal] : Bimanual Exam: Abnormal [Normal] : Anus and perineum: Normal sphincter tone, no masses, no prolapse. [Fully active, able to carry on all pre-disease performance without restriction] : Status 0 - Fully active, able to carry on all pre-disease performance without restriction [de-identified] : cervix is flush without obvious external os; posttreatment  (laser) changes; see colpo Head is atraumatic. Head shape is symmetrical. [de-identified] : bulky cervix/EILEEN on exam however it is mobile; b/l adnexa not palpable on bimanual exam.

## 2024-02-28 NOTE — ED PROVIDER NOTE - INTERPRETATION
Price (Do Not Change): 0.00 Instructions: This plan will send the code FBSD to the PM system.  DO NOT or CHANGE the price. Detail Level: Simple abnormal

## 2024-03-21 NOTE — ED ADULT NURSE NOTE - SUICIDE SCREENING QUESTION 3
PATRICIA ambulatory encounter  FAMILY PRACTICE OFFICE VISIT    CHIEF COMPLAINT:    Chief Complaint   Patient presents with    Rash     L/swelling/ starting on R hand as well     SUBJECTIVE:  Natty Norman is a 69 year old female who presented requesting evaluation for the following medical problems:    1. Rash, bilateral hands: The patient presents for evaluation of this medical problem. She reports a pruritic rash affecting bilateral hands. Patient reports severe dryness to the area, and is prone to scratching throughout the night. Requests evaluation and treatment.     2. Rheumatoid arthritis: The patient presents to follow up on this medical problem. She continues on a medical regimen of Motrin 800 mg three times daily as needed. She reports no new concerns regarding this issue at today's visit.     3. Chronic diastolic congestive heart failure: The patient presents to follow up on this medical problem. Patient also follows with cardiology for this issue. She continues on a medication regimen of Losartan 100 mg daily and Hydralazine 50 mg twice daily as well as Furosemide 60 mg daily.     4. Hypercholesterolemia: The patient presents for a routine follow-up for this medical problem. She continues tolerating her current medication regimen of Atorvastatin 10 mg daily. Denies any new problems or concerns regarding this issue at this time.    5. PAF (paroxysmal atrial fibrillation): The patient presents for a routine follow-up for this medical problem. She continues on a medication regimen of Aspirin 81 mg daily.    6. JITENDRA (Obstructive sleep apnea): The patient presents for a routine follow-up for this medical problem. Patient is benefiting from CPAP therapy, and it has proven effective in treating symptoms of sleep apnea syndrome.     7. COPD (chronic obstructive pulmonary disease): The patient is here for a follow up on this chronic problem. The patient is tolerating her current medication regimen of Duoneb 0.5-2.5  mg/ 3mL 3 mLs every 4 hours as needed and Breo Ellipta 100-25 mcg/act 1 puff daily with no problems. Denies any shortness of breath, chest pain, nausea or headache.    8. Mood disorder: The patient presents for a follow up on this medical issue. Patient follows with behavorial health for this problem.     9. 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  She denies having any concerns at this time to discuss.     Review of systems:    Respiratory: Negative for shortness of breath.  Skin: Positive for pruritic rash, bilateral hands.  Cardiovascular: Negative for chest pain.  Gastrointestinal: Negative for nausea.  Neurological: Negative for headache.     OBJECTIVE:  PROBLEM LIST:    Patient Active Problem List   Diagnosis    Mood disorder (CMS/[facility])    History of tobacco use    Overactive bladder    Hypertensive heart disease without heart failure    Hypercholesterolemia    Mild intermittent asthma     Rheumatoid arthritis (CMD)    Obesity, Class III, BMI 40-49.9 (morbid obesity) (CMD)    PAF (paroxysmal atrial fibrillation) (CMD)    Nocturnal hypoxemia    JITENDRA (obstructive sleep apnea)    Anemia    Edema    Multifocal pneumonia    Multiple falls    Chronic obstructive pulmonary disease, unspecified COPD type (CMD)       PAST HISTORIES:  I have reviewed the past medical history, family history, social history, medications and allergies listed in the medical record as obtained by my nursing staff and support staff and agree with their documentation.  ALLERGIES:   Allergen Reactions    Lisinopril Cough    Advair Diskus Other (See Comments)     Thrush    Amlodipine SWELLING    Dilaudid [Hydromorphone Hcl] RASH    Gabapentin HEADACHES     Shakiness, constipation    Hydrocodone RASH and PRURITUS    Montelukast RASH    Seasonal Other (See Comments)     environmental allergies runny noise itchy eyes    Singulair RASH    Sulfa Antibiotics HIVES and  RASH    Sulfasalazine HIVES and RASH     Current Outpatient Medications   Medication Sig Dispense Refill    triamcinolone (ARISTOCORT) 0.1 % ointment Apply to affected area twice a day.Mix 1:1 ratio with Eucerin cream 453.6 g 1    Emollient (Eucerin Daily Hydration) Lotion Apply to affected area twice a day. Mix 1:1 ration with triamcinolone ointment 500 mL 1    furosemide (LASIX) 40 MG tablet Take 1.5 tablets by mouth daily. 135 tablet 0    ibuprofen (MOTRIN) 800 MG tablet TAKE 1 TABLET BY MOUTH THREE TIMES DAILY WITH FOOD AS NEEDED FOR PAIN 90 tablet 3    hydrALAZINE (APRESOLINE) 50 MG tablet TAKE 1 TABLET BY MOUTH IN THE MORNING AND IN THE EVENING 90 tablet 1    losartan (COZAAR) 50 MG tablet Take 2 tablets by mouth daily. 135 tablet 1    folic acid (FOLATE) 1 MG tablet TAKE 1 TABLET BY MOUTH EVERY DAY ON DAYS NOT TAKING METHOTREXATE 90 tablet 3    methotrexate 2.5 MG tablet Indications: Psoriasis Take 6 tablets by mouth 1 day a week. 72 tablet 3    atorvastatin (LIPITOR) 10 MG tablet TAKE 1 TABLET BY MOUTH EVERY DAY 90 tablet 0    Aspirin Low Dose 81 MG EC tablet TAKE 1 TABLET BY MOUTH EVERY DAY 90 tablet 3    metOLazone (ZAROXOLYN) 2.5 MG tablet Take 1 tablet by mouth every other day for 3 doses. 3 tablet 0    isosorbide mononitrate (IMDUR) 60 MG 24 hr tablet Take 1 tablet by mouth daily. 90 tablet 1    ipratropium-albuterol (DUONEB) 0.5-2.5 (3) MG/3ML nebulizer solution Take 3 mLs by nebulization every 4 hours as needed for Wheezing.      LORazepam (ATIVAN) 1 MG tablet Indications: Trouble Sleeping Take 1 mg in am and 2 mg hs. For nursing home use only 60 tablet 0    Breo Ellipta 100-25 MCG/ACT inhaler INHALE 1 PUFF BY MOUTH EVERY DAY      Vitamin E 180 mg (400 units) capsule Take 180 mg by mouth daily.      Cholecalciferol (Vitamin D) 50 mcg (2,000 units) tablet Take 50 mcg by mouth daily.      mirtazapine (REMERON) 45 MG tablet Take 45 mg by mouth nightly.      Xolair 150 MG/ML Solution Prefilled Syringe  Inject every two weeks with 75 mg/0.5 ml syringe for seasonal allergies      Xolair 75 MG/0.5ML Solution Prefilled Syringe Inject every two weeks with 150 mg/ml syringe for seasonal allergies      albuterol (Ventolin HFA) 108 (90 Base) MCG/ACT inhaler Inhale 2 puffs into the lungs every 4 hours as needed for Shortness of Breath. 1 Inhaler 2    Multiple Vitamins-Calcium (ONE-A-DAY WOMENS PO) Take 1 tablet by mouth daily.       divalproex (DEPAKOTE) 500 MG delayed release EC tablet Take 500 mg by mouth in the morning and 500 mg in the evening.      budesonide/formoterol (SYMBICORT) 80-4.5 MCG/ACT inhaler Inhale 2 puffs into the lungs 2 times daily.       No current facility-administered medications for this visit.     Immunization History   Administered Date(s) Administered    COVID Moderna 0.5 mL 12Y+ 04/30/2021, 05/28/2021, 02/04/2022    COVID Moderna Booster 0.25 mL 12Y+ 04/30/2021, 05/28/2021, 02/04/2022    COVID Moderna/Spikevax 12+ (0055-2722) 03/21/2024    Influenza, High Dose quadrivalent, preserve-free 10/06/2020, 11/12/2021, 03/21/2024    Influenza, high dose seasonal, preservative-free 11/06/2019    Influenza, quadrivalent, preserve-free 09/25/2016, 09/25/2016    Influenza, seasonal, injectable, trivalent 09/01/2016    Pneumococcal Conjugate 13 Valent Vacc (Prevnar 13) 11/06/2019    Pneumococcal Conjugate 20 Valent Vacc (Prevnar 20) 08/12/2023    Pneumococcal Polysaccharide Vacc (Pneumovax 23) 09/25/2016, 09/25/2016, 11/12/2021    Tdap 04/24/2017    Tuberculin Skin Test Unspecified Formulation 08/04/2023, 08/11/2023     Past Medical History:   Diagnosis Date    Allergy     Bipolar 1 disorder (CMD)     Chronic bronchitis (CMD)     Chronic pain     Depressive disorder     Diverticulosis     HLD (hyperlipidemia)     Lump or mass in breast     Nonspecific elevation of levels of transaminase or lactic acid dehydrogenase (LDH)     Osteopenia     Other chronic nonalcoholic liver disease     Other dyspnea and  respiratory abnormality     Overweight(278.02)     Unspecified asthma(493.90)     Unspecified urinary incontinence      Past Surgical History:   Procedure Laterality Date    Breast lumpectomy      right    Colonoscopy diagnostic  2002    Knee surgery      Ovarian cyst removal       Social History     Tobacco Use    Smoking status: Former     Current packs/day: 0.00     Average packs/day: 0.5 packs/day for 1 year (0.5 ttl pk-yrs)     Types: Cigarettes     Start date: 3/12/1962     Quit date: 3/12/1963     Years since quittin.0    Smokeless tobacco: Never   Substance Use Topics    Alcohol use: Yes     Alcohol/week: 0.0 standard drinks of alcohol     Comment: rare    Drug use: No     Social History     Tobacco Use   Smoking Status Former    Current packs/day: 0.00    Average packs/day: 0.5 packs/day for 1 year (0.5 ttl pk-yrs)    Types: Cigarettes    Start date: 3/12/1962    Quit date: 3/12/1963    Years since quittin.0   Smokeless Tobacco Never     Social History     Substance and Sexual Activity   Alcohol Use Yes    Alcohol/week: 0.0 standard drinks of alcohol    Comment: rare     Family History   Problem Relation Age of Onset    Neurologic Disorder Mother         senile dementia    Depression Mother     Hypertension Mother     Cancer Father         stomach    Heart Brother      Health Maintenance   Topic Date Due    Shingles Vaccine (1 of 2) Never done    Colorectal Cancer Screen-  2016    Osteoporosis Screening  Never done    Breast Cancer Screening  2020    Depression Screening  2025    DTaP/Tdap/Td Vaccine (2 - Td or Tdap) 2027    Influenza Vaccine  Completed    Hepatitis C Screening  Completed    Medicare Advantage- Medicare Wellness Visit  Completed    COVID-19 Vaccine  Completed    Pneumococcal Vaccine 65+  Completed    Meningococcal Vaccine  Aged Out    Hepatitis B Vaccine (For Physician/APC Discussion)  Aged Out    HPV Vaccine  Aged Out     Physical Exam:     Constitutional:       General: She  is not in acute distress.     Appearance: Normal appearance. She is not ill-appearing, toxic-appearing or diaphoretic.   HENT:      Head: Normocephalic and atraumatic.      Right Ear: External ear normal.      Left Ear: External ear normal.      Neck: Normal range of motion and neck supple. No rigidity.   Eyes:      General: No scleral icterus.        Right eye: No discharge.         Left eye: No discharge.      Conjunctiva/sclera: Conjunctivae normal.   Cardiovascular:      Rate and Rhythm: Normal rate and regular rhythm.      Heart sounds: No murmur heard.    No friction rub. No gallop.   Pulmonary:      Effort: Pulmonary effort is normal. No respiratory distress.      Breath sounds: Normal breath sounds. No stridor. No wheezing, rhonchi or rales.   Musculoskeletal:      Right lower leg: No edema.      Left lower leg: No edema.   Lymphadenopathy:      Cervical: No cervical adenopathy.   Skin:     General: Scaly skin on hands. Skin is dry.   Neurological:      General: No focal deficit present.      Mental Status: She is alert and oriented to person, place, and time. Mental status is at baseline.   Psychiatric:         Mood and Affect: Mood normal.         Behavior: Behavior normal.         Thought Content: Thought content normal.         Judgment: Judgment normal.     LAB RESULTS:  All pertinent laboratory results reviewed.    Assessment:   1. Rash, bilateral hands    2. Rheumatoid arthritis, involving unspecified site, unspecified whether rheumatoid factor present (CMD)    3. Chronic diastolic congestive heart failure (CMD)    4. Hypercholesterolemia    5. PAF (paroxysmal atrial fibrillation) (CMD)    6. JITENDRA (obstructive sleep apnea)    7. Chronic obstructive pulmonary disease, unspecified COPD type (CMD)    8. Mood disorder (CMD)    9. Obesity, Class III, BMI 40-49.9 (morbid obesity) (CMD)    10. Encounter for screening mammogram for malignant neoplasm of breast    11.  Screening for colon cancer    12. Eczema, unspecified type    13. Need for vaccination        PLAN:  Orders Placed This Encounter    MAMMO SCREENING BILATERAL    Influenza Quadrivalent Enhanced High Dose Split Pres Free 0.7 mL Pre-filled Vacc (Fluzone High Dose Quad; ages 65+ yrs) - [id number]    COVID Moderna/Spikevax 12+(6172-1738) - [id number]    Occult Blood - iFOB (aka FIT)    Comprehensive Metabolic Panel    Lipid Panel With Reflex    Comprehensive Metabolic Panel    Lipid Panel With Reflex    triamcinolone (ARISTOCORT) 0.1 % ointment    Emollient (Eucerin Daily Hydration) Lotion     1. Rash, bilateral hands: Patient is to begin treatment with Eucerin/ Triamcinolone 0.1% 1:1 applied to affected area twice daily. Will continue to monitor and make further recommendations as needed.    2. Rheumatoid arthritis: The current medical regimen of Motrin 800 mg three times daily as needed is effective; continue present plan and medications.    3. Chronic diastolic congestive heart failure: Patient is to continue on her current medication regimen of Losartan 100 mg daily and Hydralazine 50 mg twice daily as well as Furosemide 60 mg daily.     4. Hypercholesterolemia: Will check a fasting lipid panel and CMP. Will make adjustments if needed to medication and treatment plan of Atorvastatin 10 mg daily based on laboratory data.     5. PAF (paroxysmal atrial fibrillation): Patient is to continue her current medical regimen of Aspirin 81 mg daily.    6. JITENDRA (Obstructive sleep apnea): Patient is benefiting from CPAP therapy and it has proven effective in treating symptoms and sleep apnea syndrome. Will continue to monitor and make further recommendations as needed.    7. COPD (chronic obstructive pulmonary disease): The current medical regimen of  Duoneb 0.5-2.5 mg/ 3mL 3 mLs every 4 hours as needed and Breo Ellipta 100-25 mcg/act 1 puff daily is effective; continue present plan and medications.    8. Mood disorder: The patient is to  continue following with behavorial health for this medical problem.     9. Class III BMI 40-49.9:  Will continue to support and encourage the patient to adapt to a healthier living style including incorporating diet and exercise. Healthy living diet included in AVS. Will continue to monitor and make further recommendations as needed.    Return in about 6 months (around 9/21/2024) for 6 month follow-up.    Instructions provided as documented in the After Visit Summary.    The patient indicated understanding of the diagnosis and agreed with the plan of care.     On 3/22/2024, Miriam FISHER scribed the services personally performed by Tapan Castorena MD      The documentation recorded by the scribe accurately and completely reflects the service(s) I personally performed and the decisions made by me.  Tapan Castorena MD     No

## 2025-02-05 NOTE — ED PROVIDER NOTE - RESPIRATORY NEGATIVE STATEMENT, MLM
PROCEDURE NOTE  Date: 2/5/2025   Name: Joyce VAHID Martinez  YOB: 1936    Procedures        Please see the chart review-notes-scanned EGD report for detailed information   no chest pain, no cough, and no shortness of breath.

## 2025-06-14 NOTE — CONSULT NOTE ADULT - ASSESSMENT
Patient Keppra not refilled by Mail order as they thought already picked up from [facility]. Attempted to call [facility] (Mail order [facility]) but there [facility] is closed they are only open M-F 8 am-8pm EST. [phone]. Thanks, Chaparrita     Assessment:   89 yo F with  a fib (on Coumadin), PPI, HTN, p/w chest pain, worsening x 1wk, has acute left seventh through 10th rib fractures.    - obtain CT head since her INR is 5 to rule out ICH  - Multimodal pain regimen for rib fx (Tylenol tramadol, PRN oxycodone, lidocaine patch), incentive spirometer   - tertiary survey in AM   seen and examined with Dr. Arevalo
